# Patient Record
Sex: FEMALE | Race: WHITE | NOT HISPANIC OR LATINO | Employment: OTHER | URBAN - METROPOLITAN AREA
[De-identification: names, ages, dates, MRNs, and addresses within clinical notes are randomized per-mention and may not be internally consistent; named-entity substitution may affect disease eponyms.]

---

## 2020-10-20 ENCOUNTER — OFFICE VISIT (OUTPATIENT)
Dept: PRIMARY CARE CLINIC | Facility: CLINIC | Age: 70
End: 2020-10-20
Payer: MEDICARE

## 2020-10-20 ENCOUNTER — IMMUNIZATION (OUTPATIENT)
Dept: PHARMACY | Facility: CLINIC | Age: 70
End: 2020-10-20
Payer: MEDICARE

## 2020-10-20 ENCOUNTER — LAB VISIT (OUTPATIENT)
Dept: LAB | Facility: HOSPITAL | Age: 70
End: 2020-10-20
Attending: FAMILY MEDICINE
Payer: MEDICARE

## 2020-10-20 VITALS
BODY MASS INDEX: 26.64 KG/M2 | WEIGHT: 150.38 LBS | OXYGEN SATURATION: 100 % | HEIGHT: 63 IN | HEART RATE: 75 BPM | DIASTOLIC BLOOD PRESSURE: 60 MMHG | SYSTOLIC BLOOD PRESSURE: 100 MMHG | TEMPERATURE: 98 F

## 2020-10-20 DIAGNOSIS — Z00.00 ROUTINE MEDICAL EXAM: Primary | ICD-10-CM

## 2020-10-20 DIAGNOSIS — Z00.00 ROUTINE MEDICAL EXAM: ICD-10-CM

## 2020-10-20 DIAGNOSIS — I10 ESSENTIAL HYPERTENSION: ICD-10-CM

## 2020-10-20 DIAGNOSIS — R79.9 ABNORMAL FINDING OF BLOOD CHEMISTRY, UNSPECIFIED: ICD-10-CM

## 2020-10-20 DIAGNOSIS — I82.4Y9 DEEP VEIN THROMBOSIS (DVT) OF PROXIMAL LOWER EXTREMITY, UNSPECIFIED CHRONICITY, UNSPECIFIED LATERALITY: ICD-10-CM

## 2020-10-20 DIAGNOSIS — E78.2 MIXED HYPERLIPIDEMIA: ICD-10-CM

## 2020-10-20 DIAGNOSIS — Z79.01 ON ANTICOAGULANT THERAPY: ICD-10-CM

## 2020-10-20 LAB
ALBUMIN SERPL BCP-MCNC: 4.4 G/DL (ref 3.5–5.2)
ALP SERPL-CCNC: 71 U/L (ref 55–135)
ALT SERPL W/O P-5'-P-CCNC: 27 U/L (ref 10–44)
ANION GAP SERPL CALC-SCNC: 11 MMOL/L (ref 8–16)
AST SERPL-CCNC: 36 U/L (ref 10–40)
BASOPHILS # BLD AUTO: 0.02 K/UL (ref 0–0.2)
BASOPHILS NFR BLD: 0.4 % (ref 0–1.9)
BILIRUB SERPL-MCNC: 0.6 MG/DL (ref 0.1–1)
BUN SERPL-MCNC: 17 MG/DL (ref 8–23)
CALCIUM SERPL-MCNC: 10 MG/DL (ref 8.7–10.5)
CHLORIDE SERPL-SCNC: 99 MMOL/L (ref 95–110)
CHOLEST SERPL-MCNC: 156 MG/DL (ref 120–199)
CHOLEST/HDLC SERPL: 1.9 {RATIO} (ref 2–5)
CO2 SERPL-SCNC: 28 MMOL/L (ref 23–29)
CREAT SERPL-MCNC: 0.8 MG/DL (ref 0.5–1.4)
DIFFERENTIAL METHOD: ABNORMAL
EOSINOPHIL # BLD AUTO: 0.1 K/UL (ref 0–0.5)
EOSINOPHIL NFR BLD: 1.1 % (ref 0–8)
ERYTHROCYTE [DISTWIDTH] IN BLOOD BY AUTOMATED COUNT: 13.1 % (ref 11.5–14.5)
EST. GFR  (AFRICAN AMERICAN): >60 ML/MIN/1.73 M^2
EST. GFR  (NON AFRICAN AMERICAN): >60 ML/MIN/1.73 M^2
ESTIMATED AVG GLUCOSE: 111 MG/DL (ref 68–131)
GLUCOSE SERPL-MCNC: 102 MG/DL (ref 70–110)
HBA1C MFR BLD HPLC: 5.5 % (ref 4–5.6)
HCT VFR BLD AUTO: 35.9 % (ref 37–48.5)
HDLC SERPL-MCNC: 83 MG/DL (ref 40–75)
HDLC SERPL: 53.2 % (ref 20–50)
HGB BLD-MCNC: 11.9 G/DL (ref 12–16)
IMM GRANULOCYTES # BLD AUTO: 0.01 K/UL (ref 0–0.04)
IMM GRANULOCYTES NFR BLD AUTO: 0.2 % (ref 0–0.5)
LDLC SERPL CALC-MCNC: 61.4 MG/DL (ref 63–159)
LYMPHOCYTES # BLD AUTO: 1.1 K/UL (ref 1–4.8)
LYMPHOCYTES NFR BLD: 24.9 % (ref 18–48)
MCH RBC QN AUTO: 32.7 PG (ref 27–31)
MCHC RBC AUTO-ENTMCNC: 33.1 G/DL (ref 32–36)
MCV RBC AUTO: 99 FL (ref 82–98)
MONOCYTES # BLD AUTO: 0.4 K/UL (ref 0.3–1)
MONOCYTES NFR BLD: 9.2 % (ref 4–15)
NEUTROPHILS # BLD AUTO: 2.9 K/UL (ref 1.8–7.7)
NEUTROPHILS NFR BLD: 64.2 % (ref 38–73)
NONHDLC SERPL-MCNC: 73 MG/DL
NRBC BLD-RTO: 0 /100 WBC
PLATELET # BLD AUTO: 226 K/UL (ref 150–350)
PMV BLD AUTO: 10 FL (ref 9.2–12.9)
POTASSIUM SERPL-SCNC: 4.3 MMOL/L (ref 3.5–5.1)
PROT SERPL-MCNC: 7.7 G/DL (ref 6–8.4)
RBC # BLD AUTO: 3.64 M/UL (ref 4–5.4)
SODIUM SERPL-SCNC: 138 MMOL/L (ref 136–145)
TRIGL SERPL-MCNC: 58 MG/DL (ref 30–150)
WBC # BLD AUTO: 4.46 K/UL (ref 3.9–12.7)

## 2020-10-20 PROCEDURE — 83036 HEMOGLOBIN GLYCOSYLATED A1C: CPT

## 2020-10-20 PROCEDURE — 36415 COLL VENOUS BLD VENIPUNCTURE: CPT | Mod: PN

## 2020-10-20 PROCEDURE — 80061 LIPID PANEL: CPT

## 2020-10-20 PROCEDURE — 80053 COMPREHEN METABOLIC PANEL: CPT

## 2020-10-20 PROCEDURE — 99387 INIT PM E/M NEW PAT 65+ YRS: CPT | Mod: S$GLB,,, | Performed by: FAMILY MEDICINE

## 2020-10-20 PROCEDURE — 99999 PR PBB SHADOW E&M-NEW PATIENT-LVL V: ICD-10-PCS | Mod: PBBFAC,,, | Performed by: FAMILY MEDICINE

## 2020-10-20 PROCEDURE — 85025 COMPLETE CBC W/AUTO DIFF WBC: CPT

## 2020-10-20 PROCEDURE — 99387 PR PREVENTIVE VISIT,NEW,65 & OVER: ICD-10-PCS | Mod: S$GLB,,, | Performed by: FAMILY MEDICINE

## 2020-10-20 PROCEDURE — 99999 PR PBB SHADOW E&M-NEW PATIENT-LVL V: CPT | Mod: PBBFAC,,, | Performed by: FAMILY MEDICINE

## 2020-10-20 RX ORDER — APIXABAN 5 MG/1
TABLET, FILM COATED ORAL
Qty: 180 TABLET | Refills: 1 | Status: SHIPPED | OUTPATIENT
Start: 2020-10-20 | End: 2021-05-12

## 2020-10-20 RX ORDER — ESOMEPRAZOLE MAGNESIUM 40 MG/1
CAPSULE, DELAYED RELEASE ORAL
Qty: 90 CAPSULE | Refills: 3 | Status: SHIPPED | OUTPATIENT
Start: 2020-10-20 | End: 2021-05-12

## 2020-10-20 RX ORDER — APIXABAN 5 MG/1
TABLET, FILM COATED ORAL
COMMUNITY
Start: 2020-10-14 | End: 2020-10-20 | Stop reason: SDUPTHER

## 2020-10-20 RX ORDER — ROSUVASTATIN CALCIUM 20 MG/1
TABLET, COATED ORAL
Qty: 90 TABLET | Refills: 3 | Status: SHIPPED | OUTPATIENT
Start: 2020-10-20 | End: 2021-11-01

## 2020-10-20 RX ORDER — LISINOPRIL AND HYDROCHLOROTHIAZIDE 12.5; 2 MG/1; MG/1
TABLET ORAL
COMMUNITY
Start: 2020-10-07 | End: 2020-10-20 | Stop reason: SDUPTHER

## 2020-10-20 RX ORDER — DOXYCYCLINE HYCLATE 50 MG/1
100 CAPSULE ORAL EVERY 12 HOURS PRN
COMMUNITY
End: 2020-10-20

## 2020-10-20 RX ORDER — ESOMEPRAZOLE MAGNESIUM 40 MG/1
CAPSULE, DELAYED RELEASE ORAL
COMMUNITY
Start: 2020-05-20 | End: 2020-10-20 | Stop reason: SDUPTHER

## 2020-10-20 RX ORDER — LISINOPRIL AND HYDROCHLOROTHIAZIDE 12.5; 2 MG/1; MG/1
TABLET ORAL
Qty: 90 TABLET | Refills: 3 | Status: SHIPPED | OUTPATIENT
Start: 2020-10-20 | End: 2021-05-18 | Stop reason: SINTOL

## 2020-10-20 RX ORDER — ROSUVASTATIN CALCIUM 20 MG/1
TABLET, COATED ORAL
COMMUNITY
Start: 2020-09-18 | End: 2020-10-20 | Stop reason: SDUPTHER

## 2020-10-20 NOTE — PROGRESS NOTES
Subjective:   Patient ID: Shirin Evans is a 70 y.o. female.    Chief Complaint: Establish Care and Annual Exam      HPI  70-year-old female here to establish with new PCP.  Had lower extremity DVT diagnosed March 2020.  Presently on Eliquis 5 mg p.o. b.i.d.      Patient queried and denies any further complaints.    Health maintenance reviewed  Delete patient declines gyn/Pap AMA.  States she had 1 about 2 years ago in [place]  Patient declines DEXA scan  Patient declines hep C test  Patient kinds colorectal screening AMA.  I offered Cologuard a decline  Patient declines pneumococcal vaccine  Patient declines Shingrix  Declined dermatology referral skin cancer screen  Educated about all these    ALLERGIES AND MEDICATIONS: updated and reviewed.  Review of patient's allergies indicates:  No Known Allergies    Current Outpatient Medications:     ELIQUIS 5 mg Tab, TK 1 T PO BID, Disp: 180 tablet, Rfl: 1    esomeprazole (NEXIUM) 40 MG capsule, TAKE 1 CAPSULE(40 MG) BY MOUTH DAILY in am 20 min before food, Disp: 90 capsule, Rfl: 3    lisinopriL-hydrochlorothiazide (PRINZIDE,ZESTORETIC) 20-12.5 mg per tablet, TK 1 T PO Daily, Disp: 90 tablet, Rfl: 3    rosuvastatin (CRESTOR) 20 MG tablet, TAKE 1 TABLET(20 MG) BY MOUTH DAILY, Disp: 90 tablet, Rfl: 3    Review of Systems   Constitutional: Negative for activity change, appetite change, chills, diaphoresis, fatigue, fever and unexpected weight change.   HENT: Negative for congestion, ear discharge, ear pain, facial swelling, hearing loss, nosebleeds, postnasal drip, rhinorrhea, sinus pressure, sneezing, sore throat, tinnitus, trouble swallowing and voice change.    Eyes: Negative for photophobia, pain, discharge, redness, itching and visual disturbance.   Respiratory: Negative for cough, chest tightness, shortness of breath and wheezing.    Cardiovascular: Negative for chest pain, palpitations and leg swelling.   Gastrointestinal: Negative for abdominal distention,  "abdominal pain, anal bleeding, blood in stool, constipation, diarrhea, nausea, rectal pain and vomiting.   Endocrine: Negative for cold intolerance, heat intolerance, polydipsia, polyphagia and polyuria.   Genitourinary: Negative for difficulty urinating, dysuria and flank pain.   Musculoskeletal: Negative for arthralgias, back pain, joint swelling, myalgias and neck pain.   Skin: Negative for rash.   Neurological: Negative for dizziness, tremors, seizures, syncope, speech difficulty, weakness, light-headedness, numbness and headaches.   Psychiatric/Behavioral: Negative for behavioral problems, confusion, decreased concentration, dysphoric mood, sleep disturbance and suicidal ideas. The patient is not nervous/anxious and is not hyperactive.        Objective:     Vitals:    10/20/20 0902   BP: 100/60   Pulse: 75   Temp: 98 °F (36.7 °C)   TempSrc: Oral   SpO2: 100%   Weight: 68.2 kg (150 lb 5.7 oz)   Height: 5' 3" (1.6 m)   PainSc: 0-No pain     Body mass index is 26.63 kg/m².    Physical Exam  Vitals signs and nursing note reviewed.   Constitutional:       General: She is not in acute distress.     Appearance: She is well-developed. She is not diaphoretic.   HENT:      Head: Normocephalic and atraumatic.      Right Ear: Hearing, tympanic membrane, ear canal and external ear normal. No tenderness.      Left Ear: Hearing, tympanic membrane, ear canal and external ear normal. No tenderness.      Nose: Nose normal.   Eyes:      General: Lids are normal. No scleral icterus.        Right eye: No discharge.         Left eye: No discharge.      Extraocular Movements:      Right eye: Normal extraocular motion.      Left eye: Normal extraocular motion.      Conjunctiva/sclera: Conjunctivae normal.      Right eye: Right conjunctiva is not injected.      Left eye: Left conjunctiva is not injected.      Pupils: Pupils are equal, round, and reactive to light.   Neck:      Musculoskeletal: Normal range of motion and neck supple. No " edema.      Thyroid: No thyromegaly.      Vascular: No carotid bruit or JVD.      Trachea: No tracheal deviation.   Cardiovascular:      Rate and Rhythm: Normal rate and regular rhythm.      Pulses: Normal pulses.      Heart sounds: Normal heart sounds. No murmur. No friction rub.   Pulmonary:      Effort: Pulmonary effort is normal. No accessory muscle usage or respiratory distress.      Breath sounds: Normal breath sounds. No wheezing, rhonchi or rales.   Abdominal:      General: Bowel sounds are normal. There is no distension or abdominal bruit.      Palpations: Abdomen is soft. There is no mass or pulsatile mass.      Tenderness: There is no abdominal tenderness. There is no guarding or rebound. Negative signs include Ballesteros's sign and McBurney's sign.   Lymphadenopathy:      Head:      Right side of head: No submandibular, preauricular or posterior auricular adenopathy.      Left side of head: No submandibular, preauricular or posterior auricular adenopathy.      Cervical: No cervical adenopathy.   Skin:     General: Skin is warm and dry.      Findings: No ecchymosis, erythema or rash. Rash is not urticarial.      Nails: There is no clubbing.     Neurological:      Mental Status: She is alert and oriented to person, place, and time.      GCS: GCS eye subscore is 4. GCS verbal subscore is 5. GCS motor subscore is 6.   Psychiatric:         Mood and Affect: Mood is not anxious or depressed. Affect is not angry or inappropriate.         Speech: Speech normal.         Behavior: Behavior normal. Behavior is cooperative.         Thought Content: Thought content normal.         Assessment and Plan:   Shirin was seen today for establish care and annual exam.    Diagnoses and all orders for this visit:    Routine medical exam  -     CBC auto differential; Future  -     Comprehensive Metabolic Panel; Future  -     Hemoglobin A1C; Future  -     Lipid Panel; Future  -     TSH; Future  -     T4, Free; Future  -     Cancel:  Ambulatory referral/consult to Gynecology; Future    Deep vein thrombosis (DVT) of proximal lower extremity, unspecified chronicity, unspecified laterality  -     Ambulatory referral/consult to Hematology / Oncology; Future    Abnormal finding of blood chemistry, unspecified   -     CBC auto differential; Future  -     Hemoglobin A1C; Future  -     Lipid Panel; Future    Essential hypertension    Mixed hyperlipidemia    BMI 26.0-26.9,adult    On anticoagulant therapy    Other orders  -     ELIQUIS 5 mg Tab; TK 1 T PO BID  -     lisinopriL-hydrochlorothiazide (PRINZIDE,ZESTORETIC) 20-12.5 mg per tablet; TK 1 T PO Daily  -     rosuvastatin (CRESTOR) 20 MG tablet; TAKE 1 TABLET(20 MG) BY MOUTH DAILY  -     esomeprazole (NEXIUM) 40 MG capsule; TAKE 1 CAPSULE(40 MG) BY MOUTH DAILY in am 20 min before food  -     Cancel: Ambulatory referral/consult to Dermatology; Future  -     Cancel: DXA Bone Density Spine And Hip; Future        No follow-ups on file.    THIS NOTE WILL BE SHARED WITH THE PATIENT.

## 2020-10-23 ENCOUNTER — TELEPHONE (OUTPATIENT)
Dept: HEMATOLOGY/ONCOLOGY | Facility: CLINIC | Age: 70
End: 2020-10-23

## 2020-10-23 NOTE — TELEPHONE ENCOUNTER
----- Message from Sarita Ceballos RN sent at 10/20/2020 10:42 AM CDT -----  Ok to schedule  Anamaria  ----- Message -----  From: Kayla Cantor  Sent: 10/20/2020   9:47 AM CDT  To: [facility]    Dr.Michael Rivas has placed a referral for the patient to be seen with Hematology/Oncology. Please assist with scheduling.     Deep vein thrombosis (DVT) of proximal lower extremity

## 2020-11-09 ENCOUNTER — OFFICE VISIT (OUTPATIENT)
Dept: HEMATOLOGY/ONCOLOGY | Facility: CLINIC | Age: 70
End: 2020-11-09
Payer: MEDICARE

## 2020-11-09 VITALS
HEART RATE: 71 BPM | OXYGEN SATURATION: 96 % | HEIGHT: 63 IN | DIASTOLIC BLOOD PRESSURE: 72 MMHG | TEMPERATURE: 99 F | BODY MASS INDEX: 26.72 KG/M2 | RESPIRATION RATE: 18 BRPM | WEIGHT: 150.81 LBS | SYSTOLIC BLOOD PRESSURE: 155 MMHG

## 2020-11-09 DIAGNOSIS — I10 ESSENTIAL HYPERTENSION: ICD-10-CM

## 2020-11-09 DIAGNOSIS — D53.9 MACROCYTIC ANEMIA: ICD-10-CM

## 2020-11-09 DIAGNOSIS — I82.4Y9 DEEP VEIN THROMBOSIS (DVT) OF PROXIMAL LOWER EXTREMITY, UNSPECIFIED CHRONICITY, UNSPECIFIED LATERALITY: ICD-10-CM

## 2020-11-09 DIAGNOSIS — Z86.711 HISTORY OF PULMONARY EMBOLISM: ICD-10-CM

## 2020-11-09 DIAGNOSIS — E78.2 MIXED HYPERLIPIDEMIA: Primary | ICD-10-CM

## 2020-11-09 PROCEDURE — 3008F PR BODY MASS INDEX (BMI) DOCUMENTED: ICD-10-PCS | Mod: CPTII,S$GLB,, | Performed by: INTERNAL MEDICINE

## 2020-11-09 PROCEDURE — 99205 PR OFFICE/OUTPT VISIT, NEW, LEVL V, 60-74 MIN: ICD-10-PCS | Mod: S$GLB,,, | Performed by: INTERNAL MEDICINE

## 2020-11-09 PROCEDURE — 1126F PR PAIN SEVERITY QUANTIFIED, NO PAIN PRESENT: ICD-10-PCS | Mod: S$GLB,,, | Performed by: INTERNAL MEDICINE

## 2020-11-09 PROCEDURE — 1101F PT FALLS ASSESS-DOCD LE1/YR: CPT | Mod: CPTII,S$GLB,, | Performed by: INTERNAL MEDICINE

## 2020-11-09 PROCEDURE — 3008F BODY MASS INDEX DOCD: CPT | Mod: CPTII,S$GLB,, | Performed by: INTERNAL MEDICINE

## 2020-11-09 PROCEDURE — 1126F AMNT PAIN NOTED NONE PRSNT: CPT | Mod: S$GLB,,, | Performed by: INTERNAL MEDICINE

## 2020-11-09 PROCEDURE — 99999 PR PBB SHADOW E&M-EST. PATIENT-LVL III: ICD-10-PCS | Mod: PBBFAC,,, | Performed by: INTERNAL MEDICINE

## 2020-11-09 PROCEDURE — 1159F MED LIST DOCD IN RCRD: CPT | Mod: S$GLB,,, | Performed by: INTERNAL MEDICINE

## 2020-11-09 PROCEDURE — 99205 OFFICE O/P NEW HI 60 MIN: CPT | Mod: S$GLB,,, | Performed by: INTERNAL MEDICINE

## 2020-11-09 PROCEDURE — 1101F PR PT FALLS ASSESS DOC 0-1 FALLS W/OUT INJ PAST YR: ICD-10-PCS | Mod: CPTII,S$GLB,, | Performed by: INTERNAL MEDICINE

## 2020-11-09 PROCEDURE — 99999 PR PBB SHADOW E&M-EST. PATIENT-LVL III: CPT | Mod: PBBFAC,,, | Performed by: INTERNAL MEDICINE

## 2020-11-09 PROCEDURE — 1159F PR MEDICATION LIST DOCUMENTED IN MEDICAL RECORD: ICD-10-PCS | Mod: S$GLB,,, | Performed by: INTERNAL MEDICINE

## 2020-11-09 NOTE — PROGRESS NOTES
CC: h/o PE , hematology consultation      HPI: Ms. Evans,70, is here for hematology consultation for h/o pulmonary embolism. She has hypertension, GERD. She was diagnosed with PE in March 2020 in [place].  It was unprovoked except for long drive to [place] from [place] a couple of weeks before the diagnosis . She was started on Apixaban. She denies any bleeding since she was started on Apixaban.  No recent change in weight or appetite. She has nop prior h/o DVT, PE or CVA. Her sister also had a blood clot also after a long drive. She has never smoked.       Review of Systems   Constitutional: Positive for malaise/fatigue. Negative for chills, fever and weight loss.   HENT: Negative for ear discharge, hearing loss and nosebleeds.    Eyes: Negative for blurred vision and double vision.   Respiratory: Negative for cough, sputum production and shortness of breath.    Cardiovascular: Negative for chest pain and palpitations.   Gastrointestinal: Negative for abdominal pain, blood in stool, constipation, melena, nausea and vomiting.   Genitourinary: Negative for frequency, hematuria and urgency.   Musculoskeletal: Negative for myalgias and neck pain.   Neurological: Negative for tingling and headaches.   Endo/Heme/Allergies: Negative for environmental allergies. Does not bruise/bleed easily.   Psychiatric/Behavioral: Negative for depression, hallucinations and substance abuse.        Medical History:    1. PE  2. HTn  3. Dyslipidemia  4. GERD    History reviewed. No pertinent surgical history.        Family History:    Father had CA prostate in his 80s   Maternal grandmother had CA stomach in her 70s        Social History     Socioeconomic History    Marital status: Single     Spouse name: Not on file    Number of children: Not on file    Years of education: Not on file    Highest education level: Not on file   Occupational History    Not on file   Tobacco Use    Smoking status: Never Smoker   Substance  and Sexual Activity    Alcohol use: Not on file    Drug use: Not on file    Sexual activity: Not on file   Lifestyle    Physical activity     Days per week: Not on file     Minutes per session: Not on file    Stress: Not on file       Review of patient's allergies indicates:  No Known Allergies          Current Outpatient Medications   Medication Sig    ELIQUIS 5 mg Tab TK 1 T PO BID    esomeprazole (NEXIUM) 40 MG capsule TAKE 1 CAPSULE(40 MG) BY MOUTH DAILY in am 20 min before food    lisinopriL-hydrochlorothiazide (PRINZIDE,ZESTORETIC) 20-12.5 mg per tablet TK 1 T PO Daily    rosuvastatin (CRESTOR) 20 MG tablet TAKE 1 TABLET(20 MG) BY MOUTH DAILY     No current facility-administered medications for this visit.              3/17/20 b/l LE venous doppler    Impressions    No evidence of deep vein thrombosis involving the bilateral lower extremities.            3/24/20 CTA chest    Impression:   1.  Right upper lobe pulmonary embolism.   2.  Coronary artery disease   3.  Cholelithiasis.    Component      Latest Ref Rng & Units 10/20/2020   WBC      3.90 - 12.70 K/uL 4.46   RBC      4.00 - 5.40 M/uL 3.64 (L)   Hemoglobin      12.0 - 16.0 g/dL 11.9 (L)   Hematocrit      37.0 - 48.5 % 35.9 (L)   MCV      82 - 98 fL 99 (H)   MCH      27.0 - 31.0 pg 32.7 (H)   MCHC      32.0 - 36.0 g/dL 33.1   RDW      11.5 - 14.5 % 13.1   Platelets      150 - 350 K/uL 226   MPV      9.2 - 12.9 fL 10.0   Immature Granulocytes      0.0 - 0.5 % 0.2   Gran # (ANC)      1.8 - 7.7 K/uL 2.9   Immature Grans (Abs)      0.00 - 0.04 K/uL 0.01   Lymph #      1.0 - 4.8 K/uL 1.1   Mono #      0.3 - 1.0 K/uL 0.4   Eos #      0.0 - 0.5 K/uL 0.1   Baso #      0.00 - 0.20 K/uL 0.02   nRBC      0 /100 WBC 0   Gran %      38.0 - 73.0 % 64.2   Lymph %      18.0 - 48.0 % 24.9   Mono %      4.0 - 15.0 % 9.2   Eosinophil %      0.0 - 8.0 % 1.1   Basophil %      0.0 - 1.9 % 0.4   Differential Method       Automated   Sodium      136 - 145 mmol/L 138    Potassium      3.5 - 5.1 mmol/L 4.3   Chloride      95 - 110 mmol/L 99   CO2      23 - 29 mmol/L 28   Glucose      70 - 110 mg/dL 102   BUN      8 - 23 mg/dL 17   Creatinine      0.5 - 1.4 mg/dL 0.8   Calcium      8.7 - 10.5 mg/dL 10.0   PROTEIN TOTAL      6.0 - 8.4 g/dL 7.7   Albumin      3.5 - 5.2 g/dL 4.4   BILIRUBIN TOTAL      0.1 - 1.0 mg/dL 0.6   Alkaline Phosphatase      55 - 135 U/L 71   AST      10 - 40 U/L 36   ALT      10 - 44 U/L 27   Anion Gap      8 - 16 mmol/L 11   eGFR if African American      >60 mL/min/1.73 m:2 >60.0   eGFR if non African American      >60 mL/min/1.73 m:2 >60.0   Cholesterol      120 - 199 mg/dL 156   Triglycerides      30 - 150 mg/dL 58   HDL      40 - 75 mg/dL 83 (H)   LDL Cholesterol External      63.0 - 159.0 mg/dL 61.4 (L)   HDL/Cholesterol Ratio      20.0 - 50.0 % 53.2 (H)   Total Cholesterol/HDL Ratio      2.0 - 5.0 1.9 (L)   Non-HDL Cholesterol      mg/dL 73       Vitals:    11/09/20 1030   BP: (!) 155/72   Pulse: 71   Resp: 18   Temp: 98.7 °F (37.1 °C)     SpO2 96% on RA    Physical Exam   Constitutional: She is oriented to person, place, and time. She appears well-developed.   HENT:   Head: Normocephalic and atraumatic.   Mouth/Throat: No oropharyngeal exudate.   Eyes: No scleral icterus.   Cardiovascular: Normal rate.   Pulmonary/Chest: Effort normal. No respiratory distress. She has no wheezes. She has no rales.   Abdominal: Soft. She exhibits no distension. There is no abdominal tenderness. There is no rebound.   Musculoskeletal:         General: No edema.   Lymphadenopathy:     She has no cervical adenopathy.   Neurological: She is alert and oriented to person, place, and time.   Skin: Skin is warm.   Psychiatric: She has a normal mood and affect.         Assessment:    1. History of pulmonary embolism  2. HT, essential  3. Dyslipidemia  4. GERD  5. Macrocytic anemia        Plan:      1. It was diagnosed in March 2020. No provoking factors except for a long drive.  She was not a smoker at the time. She was not using hormonal supplements/ birth control. Her sister had a blood clot after a long drive as well. No other thrombotic event in her family. She has no recent changes in weight or appetite.  She is not uptodate with colonoscopy or mammogram. I recommended that she have it both.  She had PAP smear in 2018 and was normal.  Recent CBC (10/20/20) shows mild macrocytic anemia. WBC, differential count, platelets were all normal.  Hypercoagulable work up not indicated at this time.  I recommended she stop Apixaban as she had 1 episode of PE and has been anti-coagulated for > 6 months.  She will start ASA 81mg BID after she stops Apixaban.          2,3,4: She follows with her PCP.     5. Mild, asymptomatic

## 2020-11-09 NOTE — LETTER
November 9, 2020      Jeb Rivas MD  [address]  [facility] [address]           [facility]  [address]  [facility] [address]  Phone: [phone]          Patient: Shirin Evans   MR Number: [id number]   YOB: 1950   Date of Visit: 11/9/2020       Dear Dr. Jeb Rivas:    Thank you for referring Shirin Evans to me for evaluation. Attached you will find relevant portions of my assessment and plan of care.    If you have questions, please do not hesitate to call me. I look forward to following Shirin Evans along with you.    Sincerely,    Laura Walsh MD    Enclosure  CC:  No Recipients    If you would like to receive this communication electronically, please contact [email or web address] or [phone] to request more information on [facility] access.    For providers and/or their staff who would like to refer a patient to [facility], please contact us through our one-stop-shop provider referral line, [facility], at [phone].    If you feel you have received this communication in error or would no longer like to receive these types of communications, please e-mail [email or web address]

## 2021-05-06 ENCOUNTER — PATIENT MESSAGE (OUTPATIENT)
Dept: INTERNAL MEDICINE | Facility: CLINIC | Age: 71
End: 2021-05-06

## 2021-05-06 ENCOUNTER — OFFICE VISIT (OUTPATIENT)
Dept: INTERNAL MEDICINE | Facility: CLINIC | Age: 71
End: 2021-05-06
Payer: MEDICARE

## 2021-05-06 ENCOUNTER — HOSPITAL ENCOUNTER (OUTPATIENT)
Dept: RADIOLOGY | Facility: HOSPITAL | Age: 71
Discharge: HOME | End: 2021-05-06
Attending: FAMILY MEDICINE
Payer: MEDICARE

## 2021-05-06 VITALS
TEMPERATURE: 98 F | DIASTOLIC BLOOD PRESSURE: 76 MMHG | HEART RATE: 81 BPM | SYSTOLIC BLOOD PRESSURE: 102 MMHG | WEIGHT: 152.31 LBS | HEIGHT: 63 IN | BODY MASS INDEX: 26.99 KG/M2 | OXYGEN SATURATION: 98 %
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
## 2021-05-06 DIAGNOSIS — S09.93XA FACIAL INJURY, INITIAL ENCOUNTER: ICD-10-CM

## 2021-05-06 DIAGNOSIS — S09.90XA HEAD TRAUMA, INITIAL ENCOUNTER: ICD-10-CM

## 2021-05-06 DIAGNOSIS — R42 LIGHT HEADEDNESS: ICD-10-CM

## 2021-05-06 DIAGNOSIS — F41.9 ANXIETY: ICD-10-CM
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
## 2021-05-06 PROCEDURE — 99214 OFFICE O/P EST MOD 30 MIN: CPT | Mod: S$GLB,,, | Performed by: FAMILY MEDICINE

## 2021-05-06 PROCEDURE — 99999 PR PBB SHADOW E&M-EST. PATIENT-LVL V: CPT | Mod: PBBFAC,,, | Performed by: FAMILY MEDICINE

## 2021-05-06 PROCEDURE — 99215 OFFICE O/P EST HI 40 MIN: CPT | Mod: PBBFAC,25 | Performed by: FAMILY MEDICINE

## 2021-05-06 PROCEDURE — 99214 PR OFFICE/OUTPT VISIT, EST, LEVL IV, 30-39 MIN: ICD-10-PCS | Mod: S$GLB,,, | Performed by: FAMILY MEDICINE

## 2021-05-06 PROCEDURE — 70450 CT HEAD WITHOUT CONTRAST: ICD-10-PCS | Mod: 26,,, | Performed by: RADIOLOGY

## 2021-05-06 PROCEDURE — 70450 CT HEAD/BRAIN W/O DYE: CPT | Mod: 26,,, | Performed by: RADIOLOGY

## 2021-05-06 PROCEDURE — 70450 CT HEAD/BRAIN W/O DYE: CPT | Mod: TC

## 2021-05-06 PROCEDURE — 99999 PR PBB SHADOW E&M-EST. PATIENT-LVL V: ICD-10-PCS | Mod: PBBFAC,,, | Performed by: FAMILY MEDICINE

## 2021-05-09 ENCOUNTER — PATIENT MESSAGE (OUTPATIENT)
Dept: INTERNAL MEDICINE | Facility: CLINIC | Age: 71
End: 2021-05-09

## 2021-05-18 ENCOUNTER — OFFICE VISIT (OUTPATIENT)
Dept: INTERNAL MEDICINE | Facility: CLINIC | Age: 71
End: 2021-05-18
Payer: MEDICARE

## 2021-05-18 VITALS
TEMPERATURE: 99 F | HEIGHT: 63 IN | HEART RATE: 66 BPM | OXYGEN SATURATION: 98 % | DIASTOLIC BLOOD PRESSURE: 70 MMHG | WEIGHT: 157.19 LBS | SYSTOLIC BLOOD PRESSURE: 126 MMHG | BODY MASS INDEX: 27.85 KG/M2

## 2021-05-18 DIAGNOSIS — Z12.31 SCREENING MAMMOGRAM, ENCOUNTER FOR: ICD-10-CM

## 2021-05-18 DIAGNOSIS — Z13.29 SCREENING FOR THYROID DISORDER: ICD-10-CM

## 2021-05-18 DIAGNOSIS — D64.9 ANEMIA, UNSPECIFIED TYPE: Primary | ICD-10-CM

## 2021-05-18 DIAGNOSIS — I10 ESSENTIAL HYPERTENSION: ICD-10-CM

## 2021-05-18 DIAGNOSIS — Z78.0 POST-MENOPAUSE: ICD-10-CM

## 2021-05-18 DIAGNOSIS — Z11.59 NEED FOR HEPATITIS C SCREENING TEST: ICD-10-CM

## 2021-05-18 PROCEDURE — 99999 PR PBB SHADOW E&M-EST. PATIENT-LVL IV: ICD-10-PCS | Mod: PBBFAC,,, | Performed by: FAMILY MEDICINE

## 2021-05-18 PROCEDURE — 99397 PR PREVENTIVE VISIT,EST,65 & OVER: ICD-10-PCS | Mod: S$GLB,,, | Performed by: FAMILY MEDICINE

## 2021-05-18 PROCEDURE — 99999 PR PBB SHADOW E&M-EST. PATIENT-LVL IV: CPT | Mod: PBBFAC,,, | Performed by: FAMILY MEDICINE

## 2021-05-18 PROCEDURE — 99397 PER PM REEVAL EST PAT 65+ YR: CPT | Mod: S$GLB,,, | Performed by: FAMILY MEDICINE

## 2021-05-18 RX ORDER — LISINOPRIL 20 MG/1
20 TABLET ORAL DAILY
Qty: 30 TABLET | Refills: 11 | Status: SHIPPED | OUTPATIENT
Start: 2021-05-18 | End: 2021-06-01 | Stop reason: DRUGHIGH

## 2021-05-18 RX ORDER — LISINOPRIL 20 MG/1
20 TABLET ORAL DAILY
Qty: 90 TABLET | Refills: 3 | Status: SHIPPED | OUTPATIENT
Start: 2021-05-18 | End: 2021-05-18 | Stop reason: CLARIF

## 2021-06-01 ENCOUNTER — OFFICE VISIT (OUTPATIENT)
Dept: INTERNAL MEDICINE | Facility: CLINIC | Age: 71
DRG: 026 | End: 2021-06-01
Payer: MEDICARE

## 2021-06-01 ENCOUNTER — CLINICAL SUPPORT (OUTPATIENT)
Dept: INTERNAL MEDICINE | Facility: CLINIC | Age: 71
DRG: 026 | End: 2021-06-01
Payer: MEDICARE

## 2021-06-01 VITALS
HEIGHT: 63 IN | WEIGHT: 153.69 LBS | OXYGEN SATURATION: 99 % | HEART RATE: 73 BPM | DIASTOLIC BLOOD PRESSURE: 78 MMHG | TEMPERATURE: 98 F | BODY MASS INDEX: 27.23 KG/M2 | SYSTOLIC BLOOD PRESSURE: 150 MMHG

## 2021-06-01 DIAGNOSIS — R68.89 DIFFICULTY IN VOLUNTARY MOVEMENT: ICD-10-CM

## 2021-06-01 DIAGNOSIS — I10 ESSENTIAL HYPERTENSION: ICD-10-CM

## 2021-06-01 DIAGNOSIS — R51.9 NONINTRACTABLE HEADACHE, UNSPECIFIED CHRONICITY PATTERN, UNSPECIFIED HEADACHE TYPE: ICD-10-CM

## 2021-06-01 DIAGNOSIS — R29.2 DECREASED RIGHT PATELLAR REFLEX: ICD-10-CM

## 2021-06-01 PROCEDURE — 3008F BODY MASS INDEX DOCD: CPT | Mod: CPTII,S$GLB,, | Performed by: FAMILY MEDICINE

## 2021-06-01 PROCEDURE — 3008F PR BODY MASS INDEX (BMI) DOCUMENTED: ICD-10-PCS | Mod: CPTII,S$GLB,, | Performed by: FAMILY MEDICINE

## 2021-06-01 PROCEDURE — 3288F FALL RISK ASSESSMENT DOCD: CPT | Mod: CPTII,S$GLB,, | Performed by: FAMILY MEDICINE

## 2021-06-01 PROCEDURE — 1126F PR PAIN SEVERITY QUANTIFIED, NO PAIN PRESENT: ICD-10-PCS | Mod: S$GLB,,, | Performed by: FAMILY MEDICINE

## 2021-06-01 PROCEDURE — 1126F AMNT PAIN NOTED NONE PRSNT: CPT | Mod: S$GLB,,, | Performed by: FAMILY MEDICINE

## 2021-06-01 PROCEDURE — 1159F MED LIST DOCD IN RCRD: CPT | Mod: S$GLB,,, | Performed by: FAMILY MEDICINE

## 2021-06-01 PROCEDURE — 3288F PR FALLS RISK ASSESSMENT DOCUMENTED: ICD-10-PCS | Mod: CPTII,S$GLB,, | Performed by: FAMILY MEDICINE

## 2021-06-01 PROCEDURE — 99213 PR OFFICE/OUTPT VISIT, EST, LEVL III, 20-29 MIN: ICD-10-PCS | Mod: S$GLB,,, | Performed by: FAMILY MEDICINE

## 2021-06-01 PROCEDURE — 1101F PR PT FALLS ASSESS DOC 0-1 FALLS W/OUT INJ PAST YR: ICD-10-PCS | Mod: CPTII,S$GLB,, | Performed by: FAMILY MEDICINE

## 2021-06-01 PROCEDURE — 99999 PR PBB SHADOW E&M-EST. PATIENT-LVL IV: ICD-10-PCS | Mod: PBBFAC,,, | Performed by: FAMILY MEDICINE

## 2021-06-01 PROCEDURE — 1159F PR MEDICATION LIST DOCUMENTED IN MEDICAL RECORD: ICD-10-PCS | Mod: S$GLB,,, | Performed by: FAMILY MEDICINE

## 2021-06-01 PROCEDURE — 99213 OFFICE O/P EST LOW 20 MIN: CPT | Mod: S$GLB,,, | Performed by: FAMILY MEDICINE

## 2021-06-01 PROCEDURE — 1101F PT FALLS ASSESS-DOCD LE1/YR: CPT | Mod: CPTII,S$GLB,, | Performed by: FAMILY MEDICINE

## 2021-06-01 PROCEDURE — 99999 PR PBB SHADOW E&M-EST. PATIENT-LVL IV: CPT | Mod: PBBFAC,,, | Performed by: FAMILY MEDICINE

## 2021-06-01 RX ORDER — LISINOPRIL 30 MG/1
30 TABLET ORAL DAILY
Qty: 30 TABLET | Refills: 11 | Status: ON HOLD | OUTPATIENT
Start: 2021-06-01 | End: 2021-06-11 | Stop reason: HOSPADM

## 2021-06-02 ENCOUNTER — TELEPHONE (OUTPATIENT)
Dept: INTERNAL MEDICINE | Facility: CLINIC | Age: 71
End: 2021-06-02

## 2021-06-02 ENCOUNTER — PATIENT MESSAGE (OUTPATIENT)
Dept: INTERNAL MEDICINE | Facility: CLINIC | Age: 71
End: 2021-06-02

## 2021-06-02 ENCOUNTER — PATIENT MESSAGE (OUTPATIENT)
Dept: ADMINISTRATIVE | Facility: HOSPITAL | Age: 71
End: 2021-06-02

## 2021-06-02 DIAGNOSIS — D53.9 MACROCYTIC ANEMIA: Primary | ICD-10-CM

## 2021-06-02 DIAGNOSIS — Z12.11 COLON CANCER SCREENING: ICD-10-CM

## 2021-06-02 DIAGNOSIS — D50.9 IRON DEFICIENCY ANEMIA, UNSPECIFIED IRON DEFICIENCY ANEMIA TYPE: ICD-10-CM

## 2021-06-02 RX ORDER — FERROUS SULFATE 325(65) MG
325 TABLET ORAL EVERY OTHER DAY
Qty: 45 TABLET | Refills: 3 | Status: SHIPPED | OUTPATIENT
Start: 2021-06-02 | End: 2022-07-26 | Stop reason: SDUPTHER

## 2021-06-03 ENCOUNTER — HOSPITAL ENCOUNTER (INPATIENT)
Facility: HOSPITAL | Age: 71
LOS: 9 days | Discharge: NURSING, REHAB OR LONG-TERM CARE | DRG: 026 | End: 2021-06-12
Attending: EMERGENCY MEDICINE | Admitting: PSYCHIATRY & NEUROLOGY
Payer: MEDICARE

## 2021-06-03 ENCOUNTER — HOSPITAL ENCOUNTER (OUTPATIENT)
Dept: RADIOLOGY | Facility: HOSPITAL | Age: 71
Discharge: HOME | End: 2021-06-03
Attending: PSYCHIATRY & NEUROLOGY
Payer: MEDICARE

## 2021-06-03 ENCOUNTER — HOSPITAL ENCOUNTER (EMERGENCY)
Facility: HOSPITAL | Age: 71
Discharge: ANOTHER HOSPITAL | End: 2021-06-03
Attending: EMERGENCY MEDICINE
Payer: MEDICARE

## 2021-06-03 ENCOUNTER — ANESTHESIA EVENT (OUTPATIENT)
Dept: SURGERY | Facility: HOSPITAL | Age: 71
DRG: 026 | End: 2021-06-03
Payer: MEDICARE

## 2021-06-03 ENCOUNTER — PATIENT MESSAGE (OUTPATIENT)
Dept: NEUROLOGY | Facility: CLINIC | Age: 71
End: 2021-06-03

## 2021-06-03 ENCOUNTER — OFFICE VISIT (OUTPATIENT)
Dept: NEUROLOGY | Facility: CLINIC | Age: 71
End: 2021-06-03
Payer: MEDICARE

## 2021-06-03 ENCOUNTER — HOSPITAL ENCOUNTER (OUTPATIENT)
Dept: CARDIOLOGY | Facility: HOSPITAL | Age: 71
Discharge: HOME | End: 2021-06-03
Attending: PSYCHIATRY & NEUROLOGY
Payer: MEDICARE

## 2021-06-03 VITALS
RESPIRATION RATE: 18 BRPM | BODY MASS INDEX: 26.93 KG/M2 | HEART RATE: 65 BPM | DIASTOLIC BLOOD PRESSURE: 78 MMHG | WEIGHT: 152 LBS | SYSTOLIC BLOOD PRESSURE: 145 MMHG | OXYGEN SATURATION: 97 % | TEMPERATURE: 99 F

## 2021-06-03 VITALS
BODY MASS INDEX: 27.19 KG/M2 | DIASTOLIC BLOOD PRESSURE: 82 MMHG | WEIGHT: 153.44 LBS | HEART RATE: 76 BPM | SYSTOLIC BLOOD PRESSURE: 171 MMHG | HEIGHT: 63 IN

## 2021-06-03 VITALS — WEIGHT: 153 LBS | BODY MASS INDEX: 27.11 KG/M2 | HEIGHT: 63 IN

## 2021-06-03 DIAGNOSIS — R29.898 WEAKNESS OF FOOT, RIGHT: ICD-10-CM

## 2021-06-03 DIAGNOSIS — G44.329 CHRONIC POST-TRAUMATIC HEADACHE, NOT INTRACTABLE: ICD-10-CM

## 2021-06-03 DIAGNOSIS — S06.5XAA BILATERAL SUBDURAL HEMATOMAS: Primary | ICD-10-CM

## 2021-06-03 DIAGNOSIS — Z74.09 IMPAIRED MOBILITY AND ADLS: ICD-10-CM

## 2021-06-03 DIAGNOSIS — R29.818 OTHER SYMPTOMS AND SIGNS INVOLVING THE NERVOUS SYSTEM: ICD-10-CM

## 2021-06-03 DIAGNOSIS — Z86.711 HISTORY OF PULMONARY EMBOLISM: ICD-10-CM

## 2021-06-03 DIAGNOSIS — R29.6 MULTIPLE FALLS: ICD-10-CM

## 2021-06-03 DIAGNOSIS — G93.5 BRAIN COMPRESSION: ICD-10-CM

## 2021-06-03 DIAGNOSIS — Z78.9 IMPAIRED MOBILITY AND ADLS: ICD-10-CM

## 2021-06-03 DIAGNOSIS — R27.0 ATAXIA: ICD-10-CM

## 2021-06-03 DIAGNOSIS — S06.5XAA SUBDURAL HEMATOMA: Primary | ICD-10-CM

## 2021-06-03 DIAGNOSIS — I63.9 CEREBROVASCULAR ACCIDENT (CVA), UNSPECIFIED MECHANISM: Primary | ICD-10-CM

## 2021-06-03 DIAGNOSIS — I63.9 CEREBROVASCULAR ACCIDENT (CVA), UNSPECIFIED MECHANISM: ICD-10-CM

## 2021-06-03 LAB
ABO + RH BLD: NORMAL
ALBUMIN SERPL BCP-MCNC: 4.1 G/DL (ref 3.5–5.2)
ALP SERPL-CCNC: 70 U/L (ref 55–135)
ALT SERPL W/O P-5'-P-CCNC: 12 U/L (ref 10–44)
ANION GAP SERPL CALC-SCNC: 12 MMOL/L (ref 8–16)
AORTIC ROOT ANNULUS: 2.51 CM
AORTIC VALVE CUSP SEPERATION: 1.46 CM
APTT BLDCRRT: 23 SEC (ref 21–32)
AST SERPL-CCNC: 19 U/L (ref 10–40)
AV INDEX (PROSTH): 0.92
AV MEAN GRADIENT: 5 MMHG
AV PEAK GRADIENT: 10 MMHG
AV VALVE AREA: 2.69 CM2
AV VELOCITY RATIO: 0.87
BILIRUB SERPL-MCNC: 0.6 MG/DL (ref 0.1–1)
BLD GP AB SCN CELLS X3 SERPL QL: NORMAL
BSA FOR ECHO PROCEDURE: 1.76 M2
BUN SERPL-MCNC: 10 MG/DL (ref 8–23)
CALCIUM SERPL-MCNC: 9.6 MG/DL (ref 8.7–10.5)
CHLORIDE SERPL-SCNC: 108 MMOL/L (ref 95–110)
CHOLEST SERPL-MCNC: 126 MG/DL (ref 120–199)
CHOLEST/HDLC SERPL: 2.3 {RATIO} (ref 2–5)
CO2 SERPL-SCNC: 22 MMOL/L (ref 23–29)
CREAT SERPL-MCNC: 0.6 MG/DL (ref 0.5–1.4)
CTP QC/QA: YES
CV ECHO LV RWT: 0.6 CM
DOP CALC AO PEAK VEL: 1.59 M/S
DOP CALC AO VTI: 29.11 CM
DOP CALC LVOT AREA: 2.9 CM2
DOP CALC LVOT DIAMETER: 1.93 CM
DOP CALC LVOT PEAK VEL: 1.38 M/S
DOP CALC LVOT STROKE VOLUME: 78.42 CM3
DOP CALCLVOT PEAK VEL VTI: 26.82 CM
E WAVE DECELERATION TIME: 171.3 MSEC
E/A RATIO: 0.68
E/E' RATIO: 6.84 M/S
ECHO LV POSTERIOR WALL: 1.17 CM (ref 0.6–1.1)
EJECTION FRACTION: 55 %
ERYTHROCYTE [DISTWIDTH] IN BLOOD BY AUTOMATED COUNT: 12.9 % (ref 11.5–14.5)
EST. GFR  (AFRICAN AMERICAN): >60 ML/MIN/1.73 M^2
EST. GFR  (NON AFRICAN AMERICAN): >60 ML/MIN/1.73 M^2
FRACTIONAL SHORTENING: 28 % (ref 28–44)
GLUCOSE SERPL-MCNC: 101 MG/DL (ref 70–110)
HCT VFR BLD AUTO: 36 % (ref 37–48.5)
HDLC SERPL-MCNC: 56 MG/DL (ref 40–75)
HDLC SERPL: 44.4 % (ref 20–50)
HGB BLD-MCNC: 12.6 G/DL (ref 12–16)
INR PPP: 1 (ref 0.8–1.2)
INR PPP: 1 (ref 0.8–1.2)
INTERVENTRICULAR SEPTUM: 1.19 CM (ref 0.6–1.1)
IVRT: 78.02 MSEC
LA MAJOR: 4.94 CM
LA MINOR: 5.21 CM
LA WIDTH: 3.61 CM
LDLC SERPL CALC-MCNC: 56.8 MG/DL (ref 63–159)
LEFT ATRIUM SIZE: 3.22 CM
LEFT ATRIUM VOLUME INDEX MOD: 16.3 ML/M2
LEFT ATRIUM VOLUME INDEX: 29 ML/M2
LEFT ATRIUM VOLUME MOD: 28.22 CM3
LEFT ATRIUM VOLUME: 50.11 CM3
LEFT INTERNAL DIMENSION IN SYSTOLE: 2.84 CM (ref 2.1–4)
LEFT VENTRICLE DIASTOLIC VOLUME INDEX: 38.65 ML/M2
LEFT VENTRICLE DIASTOLIC VOLUME: 66.86 ML
LEFT VENTRICLE MASS INDEX: 90 G/M2
LEFT VENTRICLE SYSTOLIC VOLUME INDEX: 17.6 ML/M2
LEFT VENTRICLE SYSTOLIC VOLUME: 30.49 ML
LEFT VENTRICULAR INTERNAL DIMENSION IN DIASTOLE: 3.92 CM (ref 3.5–6)
LEFT VENTRICULAR MASS: 156.55 G
LV LATERAL E/E' RATIO: 5.91 M/S
LV SEPTAL E/E' RATIO: 8.13 M/S
MCH RBC QN AUTO: 33.9 PG (ref 27–31)
MCHC RBC AUTO-ENTMCNC: 35 G/DL (ref 32–36)
MCV RBC AUTO: 97 FL (ref 82–98)
MV A" WAVE DURATION": 14.56 MSEC
MV MEAN GRADIENT: 1 MMHG
MV PEAK A VEL: 0.95 M/S
MV PEAK E VEL: 0.65 M/S
MV PEAK GRADIENT: 3 MMHG
MV STENOSIS PRESSURE HALF TIME: 49.68 MS
MV VALVE AREA P 1/2 METHOD: 4.43 CM2
NONHDLC SERPL-MCNC: 70 MG/DL
PISA TR MAX VEL: 2.17 M/S
PLATELET # BLD AUTO: 190 K/UL (ref 150–450)
PMV BLD AUTO: 9.4 FL (ref 9.2–12.9)
POTASSIUM SERPL-SCNC: 3.3 MMOL/L (ref 3.5–5.1)
PROT SERPL-MCNC: 7 G/DL (ref 6–8.4)
PROTHROMBIN TIME: 10.8 SEC (ref 9–12.5)
PROTHROMBIN TIME: 10.9 SEC (ref 9–12.5)
PULM VEIN S/D RATIO: 1.85
PV PEAK D VEL: 0.34 M/S
PV PEAK S VEL: 0.63 M/S
PV PEAK VELOCITY: 1.02 CM/S
RA MAJOR: 4.77 CM
RA PRESSURE: 3 MMHG
RA WIDTH: 3.58 CM
RBC # BLD AUTO: 3.72 M/UL (ref 4–5.4)
RIGHT VENTRICULAR END-DIASTOLIC DIMENSION: 2.84 CM
SARS-COV-2 RDRP RESP QL NAA+PROBE: NEGATIVE
SODIUM SERPL-SCNC: 142 MMOL/L (ref 136–145)
TDI LATERAL: 0.11 M/S
TDI SEPTAL: 0.08 M/S
TDI: 0.1 M/S
TR MAX PG: 19 MMHG
TRIGL SERPL-MCNC: 66 MG/DL (ref 30–150)
TSH SERPL DL<=0.005 MIU/L-ACNC: 1.62 UIU/ML (ref 0.4–4)
TV REST PULMONARY ARTERY PRESSURE: 22 MMHG
WBC # BLD AUTO: 5.51 K/UL (ref 3.9–12.7)

## 2021-06-03 PROCEDURE — 70544 MR ANGIOGRAPHY HEAD W/O DYE: CPT | Mod: 26,,, | Performed by: RADIOLOGY

## 2021-06-03 PROCEDURE — 99223 PR INITIAL HOSPITAL CARE,LEVL III: ICD-10-PCS | Mod: ,,, | Performed by: NURSE PRACTITIONER

## 2021-06-03 PROCEDURE — 1159F MED LIST DOCD IN RCRD: CPT | Mod: S$GLB,,, | Performed by: PSYCHIATRY & NEUROLOGY

## 2021-06-03 PROCEDURE — 70551 MRI BRAIN STEM W/O DYE: CPT | Mod: TC

## 2021-06-03 PROCEDURE — 3288F PR FALLS RISK ASSESSMENT DOCUMENTED: ICD-10-PCS | Mod: CPTII,S$GLB,, | Performed by: PSYCHIATRY & NEUROLOGY

## 2021-06-03 PROCEDURE — C8929 TTE W OR WO FOL WCON,DOPPLER: HCPCS

## 2021-06-03 PROCEDURE — 1159F PR MEDICATION LIST DOCUMENTED IN MEDICAL RECORD: ICD-10-PCS | Mod: S$GLB,,, | Performed by: PSYCHIATRY & NEUROLOGY

## 2021-06-03 PROCEDURE — 99223 1ST HOSP IP/OBS HIGH 75: CPT | Mod: ,,, | Performed by: NURSE PRACTITIONER

## 2021-06-03 PROCEDURE — 99291 CRITICAL CARE FIRST HOUR: CPT | Mod: ,,, | Performed by: EMERGENCY MEDICINE

## 2021-06-03 PROCEDURE — 93306 ECHO (CUPID ONLY): ICD-10-PCS | Mod: 26,,, | Performed by: INTERNAL MEDICINE

## 2021-06-03 PROCEDURE — 70551 MRI BRAIN WITHOUT CONTRAST: ICD-10-PCS | Mod: 26,,, | Performed by: RADIOLOGY

## 2021-06-03 PROCEDURE — 84443 ASSAY THYROID STIM HORMONE: CPT | Performed by: NURSE PRACTITIONER

## 2021-06-03 PROCEDURE — 80061 LIPID PANEL: CPT | Performed by: NURSE PRACTITIONER

## 2021-06-03 PROCEDURE — 99999 PR PBB SHADOW E&M-EST. PATIENT-LVL V: CPT | Mod: PBBFAC,,, | Performed by: PSYCHIATRY & NEUROLOGY

## 2021-06-03 PROCEDURE — 99205 PR OFFICE/OUTPT VISIT, NEW, LEVL V, 60-74 MIN: ICD-10-PCS | Mod: S$GLB,,, | Performed by: PSYCHIATRY & NEUROLOGY

## 2021-06-03 PROCEDURE — 86900 BLOOD TYPING SEROLOGIC ABO: CPT | Performed by: STUDENT IN AN ORGANIZED HEALTH CARE EDUCATION/TRAINING PROGRAM

## 2021-06-03 PROCEDURE — U0002 COVID-19 LAB TEST NON-CDC: HCPCS | Performed by: STUDENT IN AN ORGANIZED HEALTH CARE EDUCATION/TRAINING PROGRAM

## 2021-06-03 PROCEDURE — 20000000 HC ICU ROOM

## 2021-06-03 PROCEDURE — 99205 OFFICE O/P NEW HI 60 MIN: CPT | Mod: S$GLB,,, | Performed by: PSYCHIATRY & NEUROLOGY

## 2021-06-03 PROCEDURE — 1125F AMNT PAIN NOTED PAIN PRSNT: CPT | Mod: S$GLB,,, | Performed by: PSYCHIATRY & NEUROLOGY

## 2021-06-03 PROCEDURE — 25000003 PHARM REV CODE 250: Performed by: STUDENT IN AN ORGANIZED HEALTH CARE EDUCATION/TRAINING PROGRAM

## 2021-06-03 PROCEDURE — 70544 MR ANGIOGRAPHY HEAD W/O DYE: CPT | Mod: TC

## 2021-06-03 PROCEDURE — 1100F PR PT FALLS ASSESS DOC 2+ FALLS/FALL W/INJURY/YR: ICD-10-PCS | Mod: CPTII,S$GLB,, | Performed by: PSYCHIATRY & NEUROLOGY

## 2021-06-03 PROCEDURE — 85027 COMPLETE CBC AUTOMATED: CPT | Performed by: EMERGENCY MEDICINE

## 2021-06-03 PROCEDURE — 85610 PROTHROMBIN TIME: CPT | Mod: 91 | Performed by: PHYSICIAN ASSISTANT

## 2021-06-03 PROCEDURE — 99291 CRITICAL CARE FIRST HOUR: CPT | Mod: 25

## 2021-06-03 PROCEDURE — 85730 THROMBOPLASTIN TIME PARTIAL: CPT | Performed by: PHYSICIAN ASSISTANT

## 2021-06-03 PROCEDURE — 83036 HEMOGLOBIN GLYCOSYLATED A1C: CPT | Performed by: NURSE PRACTITIONER

## 2021-06-03 PROCEDURE — 1125F PR PAIN SEVERITY QUANTIFIED, PAIN PRESENT: ICD-10-PCS | Mod: S$GLB,,, | Performed by: PSYCHIATRY & NEUROLOGY

## 2021-06-03 PROCEDURE — 99999 PR PBB SHADOW E&M-EST. PATIENT-LVL V: ICD-10-PCS | Mod: PBBFAC,,, | Performed by: PSYCHIATRY & NEUROLOGY

## 2021-06-03 PROCEDURE — 93306 TTE W/DOPPLER COMPLETE: CPT | Mod: 26,,, | Performed by: INTERNAL MEDICINE

## 2021-06-03 PROCEDURE — 80053 COMPREHEN METABOLIC PANEL: CPT | Performed by: EMERGENCY MEDICINE

## 2021-06-03 PROCEDURE — 70544 MRA BRAIN WITHOUT CONTRAST: ICD-10-PCS | Mod: 26,,, | Performed by: RADIOLOGY

## 2021-06-03 PROCEDURE — 3008F BODY MASS INDEX DOCD: CPT | Mod: CPTII,S$GLB,, | Performed by: PSYCHIATRY & NEUROLOGY

## 2021-06-03 PROCEDURE — 3288F FALL RISK ASSESSMENT DOCD: CPT | Mod: CPTII,S$GLB,, | Performed by: PSYCHIATRY & NEUROLOGY

## 2021-06-03 PROCEDURE — 70551 MRI BRAIN STEM W/O DYE: CPT | Mod: 26,,, | Performed by: RADIOLOGY

## 2021-06-03 PROCEDURE — 1100F PTFALLS ASSESS-DOCD GE2>/YR: CPT | Mod: CPTII,S$GLB,, | Performed by: PSYCHIATRY & NEUROLOGY

## 2021-06-03 PROCEDURE — 99291 PR CRITICAL CARE, E/M 30-74 MINUTES: ICD-10-PCS | Mod: ,,, | Performed by: EMERGENCY MEDICINE

## 2021-06-03 PROCEDURE — 85610 PROTHROMBIN TIME: CPT | Performed by: EMERGENCY MEDICINE

## 2021-06-03 PROCEDURE — 3008F PR BODY MASS INDEX (BMI) DOCUMENTED: ICD-10-PCS | Mod: CPTII,S$GLB,, | Performed by: PSYCHIATRY & NEUROLOGY

## 2021-06-03 PROCEDURE — 99283 EMERGENCY DEPT VISIT LOW MDM: CPT | Mod: 25

## 2021-06-03 RX ORDER — ROSUVASTATIN CALCIUM 20 MG/1
20 TABLET, COATED ORAL DAILY
Status: DISCONTINUED | OUTPATIENT
Start: 2021-06-04 | End: 2021-06-12 | Stop reason: HOSPADM

## 2021-06-03 RX ORDER — SODIUM CHLORIDE 0.9 % (FLUSH) 0.9 %
10 SYRINGE (ML) INJECTION
Status: DISCONTINUED | OUTPATIENT
Start: 2021-06-03 | End: 2021-06-12 | Stop reason: HOSPADM

## 2021-06-03 RX ORDER — CLOPIDOGREL 300 MG/1
300 TABLET, FILM COATED ORAL
Status: DISCONTINUED | OUTPATIENT
Start: 2021-06-03 | End: 2021-06-03

## 2021-06-03 RX ORDER — ONDANSETRON 2 MG/ML
4 INJECTION INTRAMUSCULAR; INTRAVENOUS EVERY 8 HOURS PRN
Status: DISCONTINUED | OUTPATIENT
Start: 2021-06-03 | End: 2021-06-12 | Stop reason: HOSPADM

## 2021-06-03 RX ORDER — LEVETIRACETAM 500 MG/1
500 TABLET ORAL 2 TIMES DAILY
Status: DISCONTINUED | OUTPATIENT
Start: 2021-06-03 | End: 2021-06-04

## 2021-06-03 RX ORDER — CLOPIDOGREL BISULFATE 75 MG/1
75 TABLET ORAL DAILY
Qty: 21 TABLET | Refills: 0 | OUTPATIENT
Start: 2021-06-03 | End: 2021-06-03

## 2021-06-03 RX ORDER — LABETALOL HCL 20 MG/4 ML
10 SYRINGE (ML) INTRAVENOUS EVERY 4 HOURS PRN
Status: DISCONTINUED | OUTPATIENT
Start: 2021-06-03 | End: 2021-06-05

## 2021-06-03 RX ADMIN — LEVETIRACETAM 500 MG: 500 TABLET ORAL at 08:06

## 2021-06-04 ENCOUNTER — ANESTHESIA (OUTPATIENT)
Dept: SURGERY | Facility: HOSPITAL | Age: 71
DRG: 026 | End: 2021-06-04
Payer: MEDICARE

## 2021-06-04 ENCOUNTER — PATIENT MESSAGE (OUTPATIENT)
Dept: INTERNAL MEDICINE | Facility: CLINIC | Age: 71
End: 2021-06-04

## 2021-06-04 PROBLEM — G93.5 BRAIN COMPRESSION: Status: ACTIVE | Noted: 2021-06-04

## 2021-06-04 LAB
ALBUMIN SERPL BCP-MCNC: 3.4 G/DL (ref 3.5–5.2)
ALP SERPL-CCNC: 58 U/L (ref 55–135)
ALT SERPL W/O P-5'-P-CCNC: 12 U/L (ref 10–44)
ANION GAP SERPL CALC-SCNC: 10 MMOL/L (ref 8–16)
ANION GAP SERPL CALC-SCNC: 13 MMOL/L (ref 8–16)
AST SERPL-CCNC: 17 U/L (ref 10–40)
BACTERIA #/AREA URNS AUTO: ABNORMAL /HPF
BASOPHILS # BLD AUTO: 0.02 K/UL (ref 0–0.2)
BASOPHILS # BLD AUTO: 0.02 K/UL (ref 0–0.2)
BASOPHILS NFR BLD: 0.4 % (ref 0–1.9)
BASOPHILS NFR BLD: 0.4 % (ref 0–1.9)
BILIRUB SERPL-MCNC: 0.6 MG/DL (ref 0.1–1)
BILIRUB UR QL STRIP: NEGATIVE
BUN SERPL-MCNC: 7 MG/DL (ref 8–23)
BUN SERPL-MCNC: 9 MG/DL (ref 8–23)
CALCIUM SERPL-MCNC: 9.1 MG/DL (ref 8.7–10.5)
CALCIUM SERPL-MCNC: 9.2 MG/DL (ref 8.7–10.5)
CHLORIDE SERPL-SCNC: 108 MMOL/L (ref 95–110)
CHLORIDE SERPL-SCNC: 110 MMOL/L (ref 95–110)
CLARITY UR REFRACT.AUTO: ABNORMAL
CO2 SERPL-SCNC: 22 MMOL/L (ref 23–29)
CO2 SERPL-SCNC: 24 MMOL/L (ref 23–29)
COLOR UR AUTO: ABNORMAL
CREAT SERPL-MCNC: 0.7 MG/DL (ref 0.5–1.4)
CREAT SERPL-MCNC: 0.7 MG/DL (ref 0.5–1.4)
DIFFERENTIAL METHOD: ABNORMAL
DIFFERENTIAL METHOD: ABNORMAL
EOSINOPHIL # BLD AUTO: 0 K/UL (ref 0–0.5)
EOSINOPHIL # BLD AUTO: 0.1 K/UL (ref 0–0.5)
EOSINOPHIL NFR BLD: 0.8 % (ref 0–8)
EOSINOPHIL NFR BLD: 1.9 % (ref 0–8)
ERYTHROCYTE [DISTWIDTH] IN BLOOD BY AUTOMATED COUNT: 13.1 % (ref 11.5–14.5)
ERYTHROCYTE [DISTWIDTH] IN BLOOD BY AUTOMATED COUNT: 13.2 % (ref 11.5–14.5)
EST. GFR  (AFRICAN AMERICAN): >60 ML/MIN/1.73 M^2
EST. GFR  (AFRICAN AMERICAN): >60 ML/MIN/1.73 M^2
EST. GFR  (NON AFRICAN AMERICAN): >60 ML/MIN/1.73 M^2
EST. GFR  (NON AFRICAN AMERICAN): >60 ML/MIN/1.73 M^2
ESTIMATED AVG GLUCOSE: 111 MG/DL (ref 68–131)
GLUCOSE SERPL-MCNC: 108 MG/DL (ref 70–110)
GLUCOSE SERPL-MCNC: 94 MG/DL (ref 70–110)
GLUCOSE UR QL STRIP: NEGATIVE
HBA1C MFR BLD: 5.5 % (ref 4–5.6)
HCT VFR BLD AUTO: 30.8 % (ref 37–48.5)
HCT VFR BLD AUTO: 31.4 % (ref 37–48.5)
HGB BLD-MCNC: 10.3 G/DL (ref 12–16)
HGB BLD-MCNC: 10.6 G/DL (ref 12–16)
HGB UR QL STRIP: NEGATIVE
IMM GRANULOCYTES # BLD AUTO: 0.01 K/UL (ref 0–0.04)
IMM GRANULOCYTES # BLD AUTO: 0.02 K/UL (ref 0–0.04)
IMM GRANULOCYTES NFR BLD AUTO: 0.2 % (ref 0–0.5)
IMM GRANULOCYTES NFR BLD AUTO: 0.4 % (ref 0–0.5)
KETONES UR QL STRIP: NEGATIVE
LEUKOCYTE ESTERASE UR QL STRIP: ABNORMAL
LYMPHOCYTES # BLD AUTO: 1.2 K/UL (ref 1–4.8)
LYMPHOCYTES # BLD AUTO: 1.5 K/UL (ref 1–4.8)
LYMPHOCYTES NFR BLD: 23.8 % (ref 18–48)
LYMPHOCYTES NFR BLD: 31.1 % (ref 18–48)
MAGNESIUM SERPL-MCNC: 1.8 MG/DL (ref 1.6–2.6)
MCH RBC QN AUTO: 32.3 PG (ref 27–31)
MCH RBC QN AUTO: 32.6 PG (ref 27–31)
MCHC RBC AUTO-ENTMCNC: 33.4 G/DL (ref 32–36)
MCHC RBC AUTO-ENTMCNC: 33.8 G/DL (ref 32–36)
MCV RBC AUTO: 97 FL (ref 82–98)
MCV RBC AUTO: 97 FL (ref 82–98)
MICROSCOPIC COMMENT: ABNORMAL
MONOCYTES # BLD AUTO: 0.4 K/UL (ref 0.3–1)
MONOCYTES # BLD AUTO: 0.5 K/UL (ref 0.3–1)
MONOCYTES NFR BLD: 10.1 % (ref 4–15)
MONOCYTES NFR BLD: 8.8 % (ref 4–15)
NEUTROPHILS # BLD AUTO: 2.7 K/UL (ref 1.8–7.7)
NEUTROPHILS # BLD AUTO: 3.3 K/UL (ref 1.8–7.7)
NEUTROPHILS NFR BLD: 56.3 % (ref 38–73)
NEUTROPHILS NFR BLD: 65.8 % (ref 38–73)
NITRITE UR QL STRIP: NEGATIVE
NON-SQ EPI CELLS #/AREA URNS AUTO: <1 /HPF
NRBC BLD-RTO: 0 /100 WBC
NRBC BLD-RTO: 0 /100 WBC
PH UR STRIP: 6 [PH] (ref 5–8)
PHOSPHATE SERPL-MCNC: 4.3 MG/DL (ref 2.7–4.5)
PLATELET # BLD AUTO: 156 K/UL (ref 150–450)
PLATELET # BLD AUTO: 162 K/UL (ref 150–450)
PMV BLD AUTO: 9.5 FL (ref 9.2–12.9)
PMV BLD AUTO: 9.5 FL (ref 9.2–12.9)
POCT GLUCOSE: 93 MG/DL (ref 70–110)
POTASSIUM SERPL-SCNC: 3.4 MMOL/L (ref 3.5–5.1)
POTASSIUM SERPL-SCNC: 3.7 MMOL/L (ref 3.5–5.1)
POTASSIUM SERPL-SCNC: 3.8 MMOL/L (ref 3.5–5.1)
PROT SERPL-MCNC: 6.1 G/DL (ref 6–8.4)
PROT UR QL STRIP: NEGATIVE
RBC # BLD AUTO: 3.19 M/UL (ref 4–5.4)
RBC # BLD AUTO: 3.25 M/UL (ref 4–5.4)
RBC #/AREA URNS AUTO: 2 /HPF (ref 0–4)
SODIUM SERPL-SCNC: 143 MMOL/L (ref 136–145)
SODIUM SERPL-SCNC: 144 MMOL/L (ref 136–145)
SP GR UR STRIP: 1 (ref 1–1.03)
SQUAMOUS #/AREA URNS AUTO: 1 /HPF
URN SPEC COLLECT METH UR: ABNORMAL
WBC # BLD AUTO: 4.73 K/UL (ref 3.9–12.7)
WBC # BLD AUTO: 5.01 K/UL (ref 3.9–12.7)
WBC #/AREA URNS AUTO: 32 /HPF (ref 0–5)

## 2021-06-04 PROCEDURE — 99233 PR SUBSEQUENT HOSPITAL CARE,LEVL III: ICD-10-PCS | Mod: GC,,, | Performed by: PSYCHIATRY & NEUROLOGY

## 2021-06-04 PROCEDURE — D9220A PRA ANESTHESIA: ICD-10-PCS | Mod: ANES,,, | Performed by: ANESTHESIOLOGY

## 2021-06-04 PROCEDURE — 37000009 HC ANESTHESIA EA ADD 15 MINS: Performed by: NEUROLOGICAL SURGERY

## 2021-06-04 PROCEDURE — 87086 URINE CULTURE/COLONY COUNT: CPT | Performed by: NURSE PRACTITIONER

## 2021-06-04 PROCEDURE — 27800505 HC CATH, RADIAL ARTERY KIT: Performed by: ANESTHESIOLOGY

## 2021-06-04 PROCEDURE — 37000008 HC ANESTHESIA 1ST 15 MINUTES: Performed by: NEUROLOGICAL SURGERY

## 2021-06-04 PROCEDURE — 25000003 PHARM REV CODE 250: Performed by: NURSE PRACTITIONER

## 2021-06-04 PROCEDURE — D9220A PRA ANESTHESIA: Mod: CRNA,,, | Performed by: NURSE ANESTHETIST, CERTIFIED REGISTERED

## 2021-06-04 PROCEDURE — C1713 ANCHOR/SCREW BN/BN,TIS/BN: HCPCS | Performed by: NEUROLOGICAL SURGERY

## 2021-06-04 PROCEDURE — 25000003 PHARM REV CODE 250

## 2021-06-04 PROCEDURE — 63600175 PHARM REV CODE 636 W HCPCS: Performed by: NEUROLOGICAL SURGERY

## 2021-06-04 PROCEDURE — 25000003 PHARM REV CODE 250: Performed by: STUDENT IN AN ORGANIZED HEALTH CARE EDUCATION/TRAINING PROGRAM

## 2021-06-04 PROCEDURE — 20000000 HC ICU ROOM

## 2021-06-04 PROCEDURE — 99223 PR INITIAL HOSPITAL CARE,LEVL III: ICD-10-PCS | Mod: 57,GC,, | Performed by: NEUROLOGICAL SURGERY

## 2021-06-04 PROCEDURE — 25000003 PHARM REV CODE 250: Performed by: NEUROLOGICAL SURGERY

## 2021-06-04 PROCEDURE — 63600175 PHARM REV CODE 636 W HCPCS: Performed by: STUDENT IN AN ORGANIZED HEALTH CARE EDUCATION/TRAINING PROGRAM

## 2021-06-04 PROCEDURE — 63600175 PHARM REV CODE 636 W HCPCS: Performed by: NURSE ANESTHETIST, CERTIFIED REGISTERED

## 2021-06-04 PROCEDURE — 85025 COMPLETE CBC W/AUTO DIFF WBC: CPT | Performed by: NURSE PRACTITIONER

## 2021-06-04 PROCEDURE — 27200677 HC TRANSDUCER MONITOR KIT SINGLE: Performed by: ANESTHESIOLOGY

## 2021-06-04 PROCEDURE — 25000003 PHARM REV CODE 250: Performed by: NURSE ANESTHETIST, CERTIFIED REGISTERED

## 2021-06-04 PROCEDURE — 83735 ASSAY OF MAGNESIUM: CPT | Performed by: NURSE PRACTITIONER

## 2021-06-04 PROCEDURE — 80053 COMPREHEN METABOLIC PANEL: CPT | Performed by: NURSE PRACTITIONER

## 2021-06-04 PROCEDURE — 36620 INSERTION CATHETER ARTERY: CPT | Mod: 59,,, | Performed by: ANESTHESIOLOGY

## 2021-06-04 PROCEDURE — D9220A PRA ANESTHESIA: Mod: ANES,,, | Performed by: ANESTHESIOLOGY

## 2021-06-04 PROCEDURE — 99233 SBSQ HOSP IP/OBS HIGH 50: CPT | Mod: GC,,, | Performed by: PSYCHIATRY & NEUROLOGY

## 2021-06-04 PROCEDURE — 99223 1ST HOSP IP/OBS HIGH 75: CPT | Mod: 57,GC,, | Performed by: NEUROLOGICAL SURGERY

## 2021-06-04 PROCEDURE — 84100 ASSAY OF PHOSPHORUS: CPT | Performed by: NURSE PRACTITIONER

## 2021-06-04 PROCEDURE — 61312 PR OPEN SKULL EVAC HEMATOMA, SUPRATENTORIAL, SUB/ EXTRADURAL: ICD-10-PCS | Mod: 51,,, | Performed by: NEUROLOGICAL SURGERY

## 2021-06-04 PROCEDURE — C1729 CATH, DRAINAGE: HCPCS | Performed by: NEUROLOGICAL SURGERY

## 2021-06-04 PROCEDURE — 84132 ASSAY OF SERUM POTASSIUM: CPT | Performed by: PSYCHIATRY & NEUROLOGY

## 2021-06-04 PROCEDURE — 36620 PR INSERT CATH,ART,PERCUT,SHORTTERM: ICD-10-PCS | Mod: 59,,, | Performed by: ANESTHESIOLOGY

## 2021-06-04 PROCEDURE — 36000711: Performed by: NEUROLOGICAL SURGERY

## 2021-06-04 PROCEDURE — 61312 CRNEC/CRNOT STTL XDRL/SDRL: CPT | Mod: ,,, | Performed by: NEUROLOGICAL SURGERY

## 2021-06-04 PROCEDURE — 36000710: Performed by: NEUROLOGICAL SURGERY

## 2021-06-04 PROCEDURE — 27201423 OPTIME MED/SURG SUP & DEVICES STERILE SUPPLY: Performed by: NEUROLOGICAL SURGERY

## 2021-06-04 PROCEDURE — 85025 COMPLETE CBC W/AUTO DIFF WBC: CPT | Mod: 91 | Performed by: STUDENT IN AN ORGANIZED HEALTH CARE EDUCATION/TRAINING PROGRAM

## 2021-06-04 PROCEDURE — 63600175 PHARM REV CODE 636 W HCPCS: Performed by: NURSE PRACTITIONER

## 2021-06-04 PROCEDURE — D9220A PRA ANESTHESIA: ICD-10-PCS | Mod: CRNA,,, | Performed by: NURSE ANESTHETIST, CERTIFIED REGISTERED

## 2021-06-04 PROCEDURE — 94761 N-INVAS EAR/PLS OXIMETRY MLT: CPT

## 2021-06-04 PROCEDURE — 80048 BASIC METABOLIC PNL TOTAL CA: CPT | Performed by: STUDENT IN AN ORGANIZED HEALTH CARE EDUCATION/TRAINING PROGRAM

## 2021-06-04 PROCEDURE — 27201037 HC PRESSURE MONITORING SET UP

## 2021-06-04 PROCEDURE — 81001 URINALYSIS AUTO W/SCOPE: CPT | Performed by: NURSE PRACTITIONER

## 2021-06-04 DEVICE — SCREW UN3 AXS SD 1.5X4MM: Type: IMPLANTABLE DEVICE | Site: SCALP | Status: FUNCTIONAL

## 2021-06-04 DEVICE — PLATE BONE BUR HOLE COVER 10MM: Type: IMPLANTABLE DEVICE | Site: SCALP | Status: FUNCTIONAL

## 2021-06-04 DEVICE — PLATE BONE 2X2 HOLE SM BOX: Type: IMPLANTABLE DEVICE | Site: SCALP | Status: FUNCTIONAL

## 2021-06-04 RX ORDER — LIDOCAINE HYDROCHLORIDE AND EPINEPHRINE 10; 10 MG/ML; UG/ML
INJECTION, SOLUTION INFILTRATION; PERINEURAL
Status: DISCONTINUED | OUTPATIENT
Start: 2021-06-04 | End: 2021-06-04 | Stop reason: HOSPADM

## 2021-06-04 RX ORDER — ONDANSETRON 2 MG/ML
INJECTION INTRAMUSCULAR; INTRAVENOUS
Status: DISCONTINUED | OUTPATIENT
Start: 2021-06-04 | End: 2021-06-04

## 2021-06-04 RX ORDER — HYDROMORPHONE HYDROCHLORIDE 2 MG/ML
INJECTION, SOLUTION INTRAMUSCULAR; INTRAVENOUS; SUBCUTANEOUS
Status: DISCONTINUED | OUTPATIENT
Start: 2021-06-04 | End: 2021-06-04

## 2021-06-04 RX ORDER — DEXAMETHASONE SODIUM PHOSPHATE 4 MG/ML
INJECTION, SOLUTION INTRA-ARTICULAR; INTRALESIONAL; INTRAMUSCULAR; INTRAVENOUS; SOFT TISSUE
Status: DISCONTINUED | OUTPATIENT
Start: 2021-06-04 | End: 2021-06-04

## 2021-06-04 RX ORDER — ACETAMINOPHEN 325 MG/1
650 TABLET ORAL EVERY 4 HOURS PRN
Status: DISCONTINUED | OUTPATIENT
Start: 2021-06-04 | End: 2021-06-12 | Stop reason: HOSPADM

## 2021-06-04 RX ORDER — ROCURONIUM BROMIDE 10 MG/ML
INJECTION, SOLUTION INTRAVENOUS
Status: DISCONTINUED | OUTPATIENT
Start: 2021-06-04 | End: 2021-06-04

## 2021-06-04 RX ORDER — PHENYLEPHRINE HCL IN 0.9% NACL 1 MG/10 ML
SYRINGE (ML) INTRAVENOUS
Status: DISCONTINUED | OUTPATIENT
Start: 2021-06-04 | End: 2021-06-04

## 2021-06-04 RX ORDER — ACETAMINOPHEN 325 MG/1
650 TABLET ORAL EVERY 4 HOURS PRN
Status: DISCONTINUED | OUTPATIENT
Start: 2021-06-04 | End: 2021-06-06

## 2021-06-04 RX ORDER — LANOLIN ALCOHOL/MO/W.PET/CERES
800 CREAM (GRAM) TOPICAL
Status: DISCONTINUED | OUTPATIENT
Start: 2021-06-04 | End: 2021-06-10 | Stop reason: ALTCHOICE

## 2021-06-04 RX ORDER — LEVETIRACETAM 500 MG/1
500 TABLET ORAL 2 TIMES DAILY
Status: DISPENSED | OUTPATIENT
Start: 2021-06-04 | End: 2021-06-11

## 2021-06-04 RX ORDER — ONDANSETRON 8 MG/1
8 TABLET, ORALLY DISINTEGRATING ORAL EVERY 8 HOURS PRN
Status: DISCONTINUED | OUTPATIENT
Start: 2021-06-04 | End: 2021-06-12 | Stop reason: HOSPADM

## 2021-06-04 RX ORDER — SODIUM CHLORIDE 9 MG/ML
INJECTION, SOLUTION INTRAVENOUS CONTINUOUS
Status: DISCONTINUED | OUTPATIENT
Start: 2021-06-04 | End: 2021-06-04

## 2021-06-04 RX ORDER — BACITRACIN ZINC 500 UNIT/G
OINTMENT (GRAM) TOPICAL
Status: DISCONTINUED | OUTPATIENT
Start: 2021-06-04 | End: 2021-06-04 | Stop reason: HOSPADM

## 2021-06-04 RX ORDER — MUPIROCIN 20 MG/G
OINTMENT TOPICAL 2 TIMES DAILY
Status: DISCONTINUED | OUTPATIENT
Start: 2021-06-04 | End: 2021-06-04

## 2021-06-04 RX ORDER — CEFTRIAXONE 1 G/1
INJECTION, POWDER, FOR SOLUTION INTRAMUSCULAR; INTRAVENOUS
Status: DISCONTINUED | OUTPATIENT
Start: 2021-06-04 | End: 2021-06-04 | Stop reason: HOSPADM

## 2021-06-04 RX ORDER — HYDROCODONE BITARTRATE AND ACETAMINOPHEN 5; 325 MG/1; MG/1
1 TABLET ORAL EVERY 4 HOURS PRN
Status: DISCONTINUED | OUTPATIENT
Start: 2021-06-04 | End: 2021-06-12 | Stop reason: HOSPADM

## 2021-06-04 RX ORDER — SODIUM,POTASSIUM PHOSPHATES 280-250MG
2 POWDER IN PACKET (EA) ORAL
Status: DISCONTINUED | OUTPATIENT
Start: 2021-06-04 | End: 2021-06-10 | Stop reason: ALTCHOICE

## 2021-06-04 RX ORDER — FENTANYL CITRATE 50 UG/ML
INJECTION, SOLUTION INTRAMUSCULAR; INTRAVENOUS
Status: DISCONTINUED | OUTPATIENT
Start: 2021-06-04 | End: 2021-06-04

## 2021-06-04 RX ORDER — LIDOCAINE HYDROCHLORIDE 20 MG/ML
INJECTION, SOLUTION EPIDURAL; INFILTRATION; INTRACAUDAL; PERINEURAL
Status: DISCONTINUED | OUTPATIENT
Start: 2021-06-04 | End: 2021-06-04

## 2021-06-04 RX ORDER — NICARDIPINE HYDROCHLORIDE 2.5 MG/ML
INJECTION INTRAVENOUS
Status: DISCONTINUED | OUTPATIENT
Start: 2021-06-04 | End: 2021-06-04

## 2021-06-04 RX ORDER — MUPIROCIN 20 MG/G
OINTMENT TOPICAL 2 TIMES DAILY
Status: DISPENSED | OUTPATIENT
Start: 2021-06-04 | End: 2021-06-09

## 2021-06-04 RX ORDER — PROPOFOL 10 MG/ML
VIAL (ML) INTRAVENOUS
Status: DISCONTINUED | OUTPATIENT
Start: 2021-06-04 | End: 2021-06-04

## 2021-06-04 RX ORDER — NICARDIPINE HYDROCHLORIDE 0.2 MG/ML
INJECTION INTRAVENOUS
Status: COMPLETED
Start: 2021-06-04 | End: 2021-06-04

## 2021-06-04 RX ORDER — LEVETIRACETAM 500 MG/5ML
INJECTION, SOLUTION, CONCENTRATE INTRAVENOUS
Status: DISCONTINUED | OUTPATIENT
Start: 2021-06-04 | End: 2021-06-04

## 2021-06-04 RX ORDER — CEFAZOLIN SODIUM 1 G/3ML
2 INJECTION, POWDER, FOR SOLUTION INTRAMUSCULAR; INTRAVENOUS
Status: DISCONTINUED | OUTPATIENT
Start: 2021-06-04 | End: 2021-06-07

## 2021-06-04 RX ORDER — PHENYLEPHRINE HYDROCHLORIDE 10 MG/ML
INJECTION INTRAVENOUS
Status: DISCONTINUED | OUTPATIENT
Start: 2021-06-04 | End: 2021-06-04

## 2021-06-04 RX ORDER — NICARDIPINE HYDROCHLORIDE 0.2 MG/ML
0-15 INJECTION INTRAVENOUS CONTINUOUS
Status: DISCONTINUED | OUTPATIENT
Start: 2021-06-04 | End: 2021-06-05

## 2021-06-04 RX ORDER — POTASSIUM CHLORIDE 7.45 MG/ML
20 INJECTION INTRAVENOUS ONCE
Status: COMPLETED | OUTPATIENT
Start: 2021-06-04 | End: 2021-06-04

## 2021-06-04 RX ADMIN — ONDANSETRON 4 MG: 2 INJECTION INTRAMUSCULAR; INTRAVENOUS at 05:06

## 2021-06-04 RX ADMIN — NICARDIPINE HYDROCHLORIDE 2.5 MG: 0.2 INJECTION, SOLUTION INTRAVENOUS at 07:06

## 2021-06-04 RX ADMIN — LISINOPRIL 30 MG: 20 TABLET ORAL at 08:06

## 2021-06-04 RX ADMIN — MUPIROCIN: 20 OINTMENT TOPICAL at 08:06

## 2021-06-04 RX ADMIN — Medication 200 MCG: at 02:06

## 2021-06-04 RX ADMIN — POTASSIUM CHLORIDE 10 MEQ: 7.46 INJECTION, SOLUTION INTRAVENOUS at 10:06

## 2021-06-04 RX ADMIN — SODIUM CHLORIDE 100 ML/HR: 0.9 INJECTION, SOLUTION INTRAVENOUS at 06:06

## 2021-06-04 RX ADMIN — SODIUM CHLORIDE, SODIUM GLUCONATE, SODIUM ACETATE, POTASSIUM CHLORIDE, MAGNESIUM CHLORIDE, SODIUM PHOSPHATE, DIBASIC, AND POTASSIUM PHOSPHATE: .53; .5; .37; .037; .03; .012; .00082 INJECTION, SOLUTION INTRAVENOUS at 05:06

## 2021-06-04 RX ADMIN — SODIUM CHLORIDE 0.3 MCG/KG/MIN: 9 INJECTION, SOLUTION INTRAVENOUS at 03:06

## 2021-06-04 RX ADMIN — FENTANYL CITRATE 25 MCG: 50 INJECTION INTRAMUSCULAR; INTRAVENOUS at 02:06

## 2021-06-04 RX ADMIN — PROPOFOL 50 MG: 10 INJECTION, EMULSION INTRAVENOUS at 02:06

## 2021-06-04 RX ADMIN — HYDROMORPHONE HYDROCHLORIDE 1 MG: 2 INJECTION, SOLUTION INTRAMUSCULAR; INTRAVENOUS; SUBCUTANEOUS at 03:06

## 2021-06-04 RX ADMIN — NICARDIPINE HYDROCHLORIDE 2.5 MG: 2.5 INJECTION INTRAVENOUS at 03:06

## 2021-06-04 RX ADMIN — ACETAMINOPHEN 650 MG: 325 TABLET ORAL at 01:06

## 2021-06-04 RX ADMIN — ACETAMINOPHEN 650 MG: 325 TABLET ORAL at 08:06

## 2021-06-04 RX ADMIN — LEVETIRACETAM 500 MG: 500 TABLET ORAL at 08:06

## 2021-06-04 RX ADMIN — Medication 100 MCG: at 02:06

## 2021-06-04 RX ADMIN — PROPOFOL 150 MG: 10 INJECTION, EMULSION INTRAVENOUS at 02:06

## 2021-06-04 RX ADMIN — ROCURONIUM BROMIDE 35 MG: 10 INJECTION, SOLUTION INTRAVENOUS at 02:06

## 2021-06-04 RX ADMIN — LIDOCAINE HYDROCHLORIDE 100 MG: 20 INJECTION, SOLUTION EPIDURAL; INFILTRATION; INTRACAUDAL at 02:06

## 2021-06-04 RX ADMIN — SODIUM CHLORIDE, SODIUM GLUCONATE, SODIUM ACETATE, POTASSIUM CHLORIDE, MAGNESIUM CHLORIDE, SODIUM PHOSPHATE, DIBASIC, AND POTASSIUM PHOSPHATE: .53; .5; .37; .037; .03; .012; .00082 INJECTION, SOLUTION INTRAVENOUS at 02:06

## 2021-06-04 RX ADMIN — FENTANYL CITRATE 75 MCG: 50 INJECTION INTRAMUSCULAR; INTRAVENOUS at 02:06

## 2021-06-04 RX ADMIN — CEFAZOLIN 2 G: 330 INJECTION, POWDER, FOR SOLUTION INTRAMUSCULAR; INTRAVENOUS at 06:06

## 2021-06-04 RX ADMIN — FENTANYL CITRATE 100 MCG: 50 INJECTION INTRAMUSCULAR; INTRAVENOUS at 03:06

## 2021-06-04 RX ADMIN — NICARDIPINE HYDROCHLORIDE 2.5 MG/HR: 0.2 INJECTION, SOLUTION INTRAVENOUS at 07:06

## 2021-06-04 RX ADMIN — ROSUVASTATIN CALCIUM 20 MG: 20 TABLET, FILM COATED ORAL at 08:06

## 2021-06-04 RX ADMIN — DEXAMETHASONE SODIUM PHOSPHATE 12 MG: 4 INJECTION, SOLUTION INTRAMUSCULAR; INTRAVENOUS at 04:06

## 2021-06-04 RX ADMIN — LEVETIRACETAM 1000 MG: 100 INJECTION, SOLUTION, CONCENTRATE INTRAVENOUS at 02:06

## 2021-06-05 ENCOUNTER — ANESTHESIA EVENT (OUTPATIENT)
Dept: SURGERY | Facility: HOSPITAL | Age: 71
DRG: 026 | End: 2021-06-05
Payer: MEDICARE

## 2021-06-05 LAB
ALBUMIN SERPL BCP-MCNC: 3.4 G/DL (ref 3.5–5.2)
ALP SERPL-CCNC: 59 U/L (ref 55–135)
ALT SERPL W/O P-5'-P-CCNC: 16 U/L (ref 10–44)
ANION GAP SERPL CALC-SCNC: 14 MMOL/L (ref 8–16)
AST SERPL-CCNC: 23 U/L (ref 10–40)
BACTERIA UR CULT: NORMAL
BACTERIA UR CULT: NORMAL
BASOPHILS # BLD AUTO: 0 K/UL (ref 0–0.2)
BASOPHILS NFR BLD: 0 % (ref 0–1.9)
BILIRUB SERPL-MCNC: 0.3 MG/DL (ref 0.1–1)
BUN SERPL-MCNC: 10 MG/DL (ref 8–23)
CALCIUM SERPL-MCNC: 9.4 MG/DL (ref 8.7–10.5)
CHLORIDE SERPL-SCNC: 108 MMOL/L (ref 95–110)
CO2 SERPL-SCNC: 20 MMOL/L (ref 23–29)
CREAT SERPL-MCNC: 0.7 MG/DL (ref 0.5–1.4)
DIFFERENTIAL METHOD: ABNORMAL
EOSINOPHIL # BLD AUTO: 0 K/UL (ref 0–0.5)
EOSINOPHIL NFR BLD: 0 % (ref 0–8)
ERYTHROCYTE [DISTWIDTH] IN BLOOD BY AUTOMATED COUNT: 12.8 % (ref 11.5–14.5)
EST. GFR  (AFRICAN AMERICAN): >60 ML/MIN/1.73 M^2
EST. GFR  (NON AFRICAN AMERICAN): >60 ML/MIN/1.73 M^2
GLUCOSE SERPL-MCNC: 141 MG/DL (ref 70–110)
HCT VFR BLD AUTO: 32.7 % (ref 37–48.5)
HGB BLD-MCNC: 11 G/DL (ref 12–16)
IMM GRANULOCYTES # BLD AUTO: 0.02 K/UL (ref 0–0.04)
IMM GRANULOCYTES NFR BLD AUTO: 0.3 % (ref 0–0.5)
LYMPHOCYTES # BLD AUTO: 0.4 K/UL (ref 1–4.8)
LYMPHOCYTES NFR BLD: 5.8 % (ref 18–48)
MAGNESIUM SERPL-MCNC: 1.9 MG/DL (ref 1.6–2.6)
MCH RBC QN AUTO: 32.7 PG (ref 27–31)
MCHC RBC AUTO-ENTMCNC: 33.6 G/DL (ref 32–36)
MCV RBC AUTO: 97 FL (ref 82–98)
MONOCYTES # BLD AUTO: 0.2 K/UL (ref 0.3–1)
MONOCYTES NFR BLD: 3.2 % (ref 4–15)
NEUTROPHILS # BLD AUTO: 5.5 K/UL (ref 1.8–7.7)
NEUTROPHILS NFR BLD: 90.7 % (ref 38–73)
NRBC BLD-RTO: 0 /100 WBC
PHOSPHATE SERPL-MCNC: 3.9 MG/DL (ref 2.7–4.5)
PLATELET # BLD AUTO: 172 K/UL (ref 150–450)
PMV BLD AUTO: 9.8 FL (ref 9.2–12.9)
POTASSIUM SERPL-SCNC: 3.8 MMOL/L (ref 3.5–5.1)
POTASSIUM SERPL-SCNC: 4 MMOL/L (ref 3.5–5.1)
PROT SERPL-MCNC: 6.3 G/DL (ref 6–8.4)
RBC # BLD AUTO: 3.36 M/UL (ref 4–5.4)
SODIUM SERPL-SCNC: 142 MMOL/L (ref 136–145)
WBC # BLD AUTO: 6.01 K/UL (ref 3.9–12.7)

## 2021-06-05 PROCEDURE — 99233 SBSQ HOSP IP/OBS HIGH 50: CPT | Mod: GC,,, | Performed by: PSYCHIATRY & NEUROLOGY

## 2021-06-05 PROCEDURE — 27000221 HC OXYGEN, UP TO 24 HOURS

## 2021-06-05 PROCEDURE — 99233 PR SUBSEQUENT HOSPITAL CARE,LEVL III: ICD-10-PCS | Mod: GC,,, | Performed by: PSYCHIATRY & NEUROLOGY

## 2021-06-05 PROCEDURE — 84132 ASSAY OF SERUM POTASSIUM: CPT | Performed by: PSYCHIATRY & NEUROLOGY

## 2021-06-05 PROCEDURE — 85025 COMPLETE CBC W/AUTO DIFF WBC: CPT | Performed by: STUDENT IN AN ORGANIZED HEALTH CARE EDUCATION/TRAINING PROGRAM

## 2021-06-05 PROCEDURE — 84100 ASSAY OF PHOSPHORUS: CPT | Performed by: NURSE PRACTITIONER

## 2021-06-05 PROCEDURE — 25000003 PHARM REV CODE 250: Performed by: NURSE PRACTITIONER

## 2021-06-05 PROCEDURE — 25000003 PHARM REV CODE 250: Performed by: PHYSICIAN ASSISTANT

## 2021-06-05 PROCEDURE — 94761 N-INVAS EAR/PLS OXIMETRY MLT: CPT

## 2021-06-05 PROCEDURE — 99900035 HC TECH TIME PER 15 MIN (STAT)

## 2021-06-05 PROCEDURE — 20000000 HC ICU ROOM

## 2021-06-05 PROCEDURE — 83735 ASSAY OF MAGNESIUM: CPT | Performed by: NURSE PRACTITIONER

## 2021-06-05 PROCEDURE — 25000003 PHARM REV CODE 250: Performed by: STUDENT IN AN ORGANIZED HEALTH CARE EDUCATION/TRAINING PROGRAM

## 2021-06-05 PROCEDURE — 63600175 PHARM REV CODE 636 W HCPCS: Performed by: STUDENT IN AN ORGANIZED HEALTH CARE EDUCATION/TRAINING PROGRAM

## 2021-06-05 PROCEDURE — 80053 COMPREHEN METABOLIC PANEL: CPT | Performed by: NURSE PRACTITIONER

## 2021-06-05 PROCEDURE — 92610 EVALUATE SWALLOWING FUNCTION: CPT

## 2021-06-05 RX ORDER — HYDRALAZINE HYDROCHLORIDE 20 MG/ML
10 INJECTION INTRAMUSCULAR; INTRAVENOUS EVERY 6 HOURS PRN
Status: DISCONTINUED | OUTPATIENT
Start: 2021-06-05 | End: 2021-06-12 | Stop reason: HOSPADM

## 2021-06-05 RX ORDER — PANTOPRAZOLE SODIUM 40 MG/1
40 TABLET, DELAYED RELEASE ORAL DAILY
Status: DISCONTINUED | OUTPATIENT
Start: 2021-06-05 | End: 2021-06-05

## 2021-06-05 RX ORDER — LISINOPRIL 10 MG/1
10 TABLET ORAL ONCE
Status: COMPLETED | OUTPATIENT
Start: 2021-06-05 | End: 2021-06-05

## 2021-06-05 RX ORDER — LISINOPRIL 20 MG/1
40 TABLET ORAL DAILY
Status: DISCONTINUED | OUTPATIENT
Start: 2021-06-06 | End: 2021-06-09

## 2021-06-05 RX ADMIN — CEFAZOLIN 2 G: 330 INJECTION, POWDER, FOR SOLUTION INTRAMUSCULAR; INTRAVENOUS at 12:06

## 2021-06-05 RX ADMIN — MUPIROCIN: 20 OINTMENT TOPICAL at 08:06

## 2021-06-05 RX ADMIN — CEFAZOLIN 2 G: 330 INJECTION, POWDER, FOR SOLUTION INTRAMUSCULAR; INTRAVENOUS at 08:06

## 2021-06-05 RX ADMIN — POTASSIUM BICARBONATE 50 MEQ: 978 TABLET, EFFERVESCENT ORAL at 05:06

## 2021-06-05 RX ADMIN — CEFAZOLIN 2 G: 330 INJECTION, POWDER, FOR SOLUTION INTRAMUSCULAR; INTRAVENOUS at 06:06

## 2021-06-05 RX ADMIN — LABETALOL HYDROCHLORIDE 10 MG: 5 INJECTION, SOLUTION INTRAVENOUS at 08:06

## 2021-06-05 RX ADMIN — ACETAMINOPHEN 650 MG: 325 TABLET ORAL at 08:06

## 2021-06-05 RX ADMIN — ROSUVASTATIN CALCIUM 20 MG: 20 TABLET, FILM COATED ORAL at 08:06

## 2021-06-05 RX ADMIN — LISINOPRIL 10 MG: 10 TABLET ORAL at 01:06

## 2021-06-05 RX ADMIN — ACETAMINOPHEN 650 MG: 325 TABLET ORAL at 04:06

## 2021-06-05 RX ADMIN — LEVETIRACETAM 500 MG: 500 TABLET ORAL at 08:06

## 2021-06-05 RX ADMIN — LISINOPRIL 30 MG: 20 TABLET ORAL at 08:06

## 2021-06-05 RX ADMIN — LABETALOL HYDROCHLORIDE 10 MG: 5 INJECTION, SOLUTION INTRAVENOUS at 04:06

## 2021-06-06 ENCOUNTER — ANESTHESIA (OUTPATIENT)
Dept: SURGERY | Facility: HOSPITAL | Age: 71
DRG: 026 | End: 2021-06-06
Payer: MEDICARE

## 2021-06-06 LAB
ALBUMIN SERPL BCP-MCNC: 3 G/DL (ref 3.5–5.2)
ALP SERPL-CCNC: 51 U/L (ref 55–135)
ALT SERPL W/O P-5'-P-CCNC: 12 U/L (ref 10–44)
ANION GAP SERPL CALC-SCNC: 10 MMOL/L (ref 8–16)
AST SERPL-CCNC: 17 U/L (ref 10–40)
BASOPHILS # BLD AUTO: 0.02 K/UL (ref 0–0.2)
BASOPHILS NFR BLD: 0.3 % (ref 0–1.9)
BILIRUB SERPL-MCNC: 0.3 MG/DL (ref 0.1–1)
BUN SERPL-MCNC: 12 MG/DL (ref 8–23)
CALCIUM SERPL-MCNC: 9.1 MG/DL (ref 8.7–10.5)
CHLORIDE SERPL-SCNC: 107 MMOL/L (ref 95–110)
CO2 SERPL-SCNC: 24 MMOL/L (ref 23–29)
CREAT SERPL-MCNC: 0.7 MG/DL (ref 0.5–1.4)
DIFFERENTIAL METHOD: ABNORMAL
EOSINOPHIL # BLD AUTO: 0 K/UL (ref 0–0.5)
EOSINOPHIL NFR BLD: 0.1 % (ref 0–8)
ERYTHROCYTE [DISTWIDTH] IN BLOOD BY AUTOMATED COUNT: 12.9 % (ref 11.5–14.5)
EST. GFR  (AFRICAN AMERICAN): >60 ML/MIN/1.73 M^2
EST. GFR  (NON AFRICAN AMERICAN): >60 ML/MIN/1.73 M^2
GLUCOSE SERPL-MCNC: 131 MG/DL (ref 70–110)
HCT VFR BLD AUTO: 29.3 % (ref 37–48.5)
HGB BLD-MCNC: 9.9 G/DL (ref 12–16)
IMM GRANULOCYTES # BLD AUTO: 0.03 K/UL (ref 0–0.04)
IMM GRANULOCYTES NFR BLD AUTO: 0.4 % (ref 0–0.5)
LYMPHOCYTES # BLD AUTO: 1.2 K/UL (ref 1–4.8)
LYMPHOCYTES NFR BLD: 16.6 % (ref 18–48)
MAGNESIUM SERPL-MCNC: 2 MG/DL (ref 1.6–2.6)
MCH RBC QN AUTO: 33.1 PG (ref 27–31)
MCHC RBC AUTO-ENTMCNC: 33.8 G/DL (ref 32–36)
MCV RBC AUTO: 98 FL (ref 82–98)
MONOCYTES # BLD AUTO: 0.7 K/UL (ref 0.3–1)
MONOCYTES NFR BLD: 9.4 % (ref 4–15)
NEUTROPHILS # BLD AUTO: 5.2 K/UL (ref 1.8–7.7)
NEUTROPHILS NFR BLD: 73.2 % (ref 38–73)
NRBC BLD-RTO: 0 /100 WBC
PHOSPHATE SERPL-MCNC: 3.1 MG/DL (ref 2.7–4.5)
PLATELET # BLD AUTO: 171 K/UL (ref 150–450)
PMV BLD AUTO: 10 FL (ref 9.2–12.9)
POTASSIUM SERPL-SCNC: 4 MMOL/L (ref 3.5–5.1)
PROT SERPL-MCNC: 5.9 G/DL (ref 6–8.4)
RBC # BLD AUTO: 2.99 M/UL (ref 4–5.4)
SODIUM SERPL-SCNC: 141 MMOL/L (ref 136–145)
WBC # BLD AUTO: 7.11 K/UL (ref 3.9–12.7)

## 2021-06-06 PROCEDURE — 27000221 HC OXYGEN, UP TO 24 HOURS

## 2021-06-06 PROCEDURE — 99233 PR SUBSEQUENT HOSPITAL CARE,LEVL III: ICD-10-PCS | Mod: GC,,, | Performed by: PSYCHIATRY & NEUROLOGY

## 2021-06-06 PROCEDURE — 25000003 PHARM REV CODE 250: Performed by: NURSE PRACTITIONER

## 2021-06-06 PROCEDURE — 94761 N-INVAS EAR/PLS OXIMETRY MLT: CPT

## 2021-06-06 PROCEDURE — 99900035 HC TECH TIME PER 15 MIN (STAT)

## 2021-06-06 PROCEDURE — 63600175 PHARM REV CODE 636 W HCPCS: Performed by: PHYSICIAN ASSISTANT

## 2021-06-06 PROCEDURE — 25000003 PHARM REV CODE 250: Performed by: STUDENT IN AN ORGANIZED HEALTH CARE EDUCATION/TRAINING PROGRAM

## 2021-06-06 PROCEDURE — 25500020 PHARM REV CODE 255: Performed by: PSYCHIATRY & NEUROLOGY

## 2021-06-06 PROCEDURE — 80053 COMPREHEN METABOLIC PANEL: CPT | Performed by: NURSE PRACTITIONER

## 2021-06-06 PROCEDURE — 25000003 PHARM REV CODE 250: Performed by: NURSE ANESTHETIST, CERTIFIED REGISTERED

## 2021-06-06 PROCEDURE — D9220A PRA ANESTHESIA: Mod: ANES,,, | Performed by: ANESTHESIOLOGY

## 2021-06-06 PROCEDURE — 63600175 PHARM REV CODE 636 W HCPCS: Performed by: STUDENT IN AN ORGANIZED HEALTH CARE EDUCATION/TRAINING PROGRAM

## 2021-06-06 PROCEDURE — 63600175 PHARM REV CODE 636 W HCPCS: Performed by: NURSE ANESTHETIST, CERTIFIED REGISTERED

## 2021-06-06 PROCEDURE — 37000008 HC ANESTHESIA 1ST 15 MINUTES: Performed by: ANESTHESIOLOGY

## 2021-06-06 PROCEDURE — 84100 ASSAY OF PHOSPHORUS: CPT | Performed by: PSYCHIATRY & NEUROLOGY

## 2021-06-06 PROCEDURE — D9220A PRA ANESTHESIA: ICD-10-PCS | Mod: CRNA,,, | Performed by: NURSE ANESTHETIST, CERTIFIED REGISTERED

## 2021-06-06 PROCEDURE — D9220A PRA ANESTHESIA: ICD-10-PCS | Mod: ANES,,, | Performed by: ANESTHESIOLOGY

## 2021-06-06 PROCEDURE — D9220A PRA ANESTHESIA: Mod: CRNA,,, | Performed by: NURSE ANESTHETIST, CERTIFIED REGISTERED

## 2021-06-06 PROCEDURE — 99233 SBSQ HOSP IP/OBS HIGH 50: CPT | Mod: GC,,, | Performed by: PSYCHIATRY & NEUROLOGY

## 2021-06-06 PROCEDURE — 83735 ASSAY OF MAGNESIUM: CPT | Performed by: PSYCHIATRY & NEUROLOGY

## 2021-06-06 PROCEDURE — 85025 COMPLETE CBC W/AUTO DIFF WBC: CPT | Performed by: NURSE PRACTITIONER

## 2021-06-06 PROCEDURE — 37000009 HC ANESTHESIA EA ADD 15 MINS: Performed by: ANESTHESIOLOGY

## 2021-06-06 PROCEDURE — 20000000 HC ICU ROOM

## 2021-06-06 RX ORDER — IODIXANOL 320 MG/ML
300 INJECTION, SOLUTION INTRAVASCULAR
Status: COMPLETED | OUTPATIENT
Start: 2021-06-06 | End: 2021-06-06

## 2021-06-06 RX ORDER — ONDANSETRON 2 MG/ML
INJECTION INTRAMUSCULAR; INTRAVENOUS
Status: DISCONTINUED | OUTPATIENT
Start: 2021-06-06 | End: 2021-06-06

## 2021-06-06 RX ORDER — MIDAZOLAM HYDROCHLORIDE 1 MG/ML
INJECTION INTRAMUSCULAR; INTRAVENOUS
Status: DISCONTINUED | OUTPATIENT
Start: 2021-06-06 | End: 2021-06-06

## 2021-06-06 RX ORDER — HEPARIN SODIUM 1000 [USP'U]/ML
INJECTION, SOLUTION INTRAVENOUS; SUBCUTANEOUS
Status: DISCONTINUED | OUTPATIENT
Start: 2021-06-06 | End: 2021-06-06

## 2021-06-06 RX ORDER — LIDOCAINE HYDROCHLORIDE 20 MG/ML
INJECTION INTRAVENOUS
Status: DISCONTINUED | OUTPATIENT
Start: 2021-06-06 | End: 2021-06-06

## 2021-06-06 RX ORDER — PHENYLEPHRINE HYDROCHLORIDE 10 MG/ML
INJECTION INTRAVENOUS
Status: DISCONTINUED | OUTPATIENT
Start: 2021-06-06 | End: 2021-06-06

## 2021-06-06 RX ORDER — FENTANYL CITRATE 50 UG/ML
INJECTION, SOLUTION INTRAMUSCULAR; INTRAVENOUS
Status: DISCONTINUED | OUTPATIENT
Start: 2021-06-06 | End: 2021-06-06

## 2021-06-06 RX ORDER — PROPOFOL 10 MG/ML
VIAL (ML) INTRAVENOUS CONTINUOUS PRN
Status: DISCONTINUED | OUTPATIENT
Start: 2021-06-06 | End: 2021-06-06

## 2021-06-06 RX ADMIN — PHENYLEPHRINE HYDROCHLORIDE 50 MCG: 10 INJECTION INTRAVENOUS at 02:06

## 2021-06-06 RX ADMIN — LIDOCAINE HYDROCHLORIDE 40 MG: 20 INJECTION, SOLUTION INTRAVENOUS at 02:06

## 2021-06-06 RX ADMIN — CEFAZOLIN 2 G: 330 INJECTION, POWDER, FOR SOLUTION INTRAMUSCULAR; INTRAVENOUS at 09:06

## 2021-06-06 RX ADMIN — SODIUM CHLORIDE 0.2 MCG/KG/MIN: 9 INJECTION, SOLUTION INTRAVENOUS at 02:06

## 2021-06-06 RX ADMIN — MIDAZOLAM HYDROCHLORIDE 2 MG: 1 INJECTION, SOLUTION INTRAMUSCULAR; INTRAVENOUS at 02:06

## 2021-06-06 RX ADMIN — FENTANYL CITRATE 50 MCG: 50 INJECTION, SOLUTION INTRAMUSCULAR; INTRAVENOUS at 03:06

## 2021-06-06 RX ADMIN — MUPIROCIN: 20 OINTMENT TOPICAL at 08:06

## 2021-06-06 RX ADMIN — IODIXANOL 225 ML: 320 INJECTION, SOLUTION INTRAVASCULAR at 05:06

## 2021-06-06 RX ADMIN — ACETAMINOPHEN 650 MG: 325 TABLET ORAL at 12:06

## 2021-06-06 RX ADMIN — MUPIROCIN: 20 OINTMENT TOPICAL at 09:06

## 2021-06-06 RX ADMIN — PHENYLEPHRINE HYDROCHLORIDE 50 MCG: 10 INJECTION INTRAVENOUS at 03:06

## 2021-06-06 RX ADMIN — CEFAZOLIN 2 G: 330 INJECTION, POWDER, FOR SOLUTION INTRAMUSCULAR; INTRAVENOUS at 05:06

## 2021-06-06 RX ADMIN — ACETAMINOPHEN 650 MG: 325 TABLET ORAL at 06:06

## 2021-06-06 RX ADMIN — HYDROCODONE BITARTRATE AND ACETAMINOPHEN 1 TABLET: 5; 325 TABLET ORAL at 07:06

## 2021-06-06 RX ADMIN — CEFAZOLIN 2 G: 330 INJECTION, POWDER, FOR SOLUTION INTRAMUSCULAR; INTRAVENOUS at 12:06

## 2021-06-06 RX ADMIN — FENTANYL CITRATE 50 MCG: 50 INJECTION, SOLUTION INTRAMUSCULAR; INTRAVENOUS at 02:06

## 2021-06-06 RX ADMIN — PROPOFOL 50 MCG/KG/MIN: 10 INJECTION, EMULSION INTRAVENOUS at 02:06

## 2021-06-06 RX ADMIN — LEVETIRACETAM 500 MG: 500 TABLET ORAL at 08:06

## 2021-06-06 RX ADMIN — HYDRALAZINE HYDROCHLORIDE 10 MG: 20 INJECTION INTRAMUSCULAR; INTRAVENOUS at 09:06

## 2021-06-06 RX ADMIN — HEPARIN SODIUM 1500 UNITS: 1000 INJECTION, SOLUTION INTRAVENOUS; SUBCUTANEOUS at 02:06

## 2021-06-06 RX ADMIN — ONDANSETRON 4 MG: 2 INJECTION INTRAMUSCULAR; INTRAVENOUS at 02:06

## 2021-06-07 LAB
ALBUMIN SERPL BCP-MCNC: 3 G/DL (ref 3.5–5.2)
ALP SERPL-CCNC: 54 U/L (ref 55–135)
ALT SERPL W/O P-5'-P-CCNC: 8 U/L (ref 10–44)
ANION GAP SERPL CALC-SCNC: 14 MMOL/L (ref 8–16)
AST SERPL-CCNC: 11 U/L (ref 10–40)
BASOPHILS # BLD AUTO: 0.03 K/UL (ref 0–0.2)
BASOPHILS NFR BLD: 0.5 % (ref 0–1.9)
BILIRUB SERPL-MCNC: 0.3 MG/DL (ref 0.1–1)
BUN SERPL-MCNC: 9 MG/DL (ref 8–23)
CALCIUM SERPL-MCNC: 9.2 MG/DL (ref 8.7–10.5)
CHLORIDE SERPL-SCNC: 105 MMOL/L (ref 95–110)
CO2 SERPL-SCNC: 22 MMOL/L (ref 23–29)
CREAT SERPL-MCNC: 0.7 MG/DL (ref 0.5–1.4)
DIFFERENTIAL METHOD: ABNORMAL
EOSINOPHIL # BLD AUTO: 0.1 K/UL (ref 0–0.5)
EOSINOPHIL NFR BLD: 1.3 % (ref 0–8)
ERYTHROCYTE [DISTWIDTH] IN BLOOD BY AUTOMATED COUNT: 12.8 % (ref 11.5–14.5)
EST. GFR  (AFRICAN AMERICAN): >60 ML/MIN/1.73 M^2
EST. GFR  (NON AFRICAN AMERICAN): >60 ML/MIN/1.73 M^2
GLUCOSE SERPL-MCNC: 84 MG/DL (ref 70–110)
HCT VFR BLD AUTO: 31.1 % (ref 37–48.5)
HGB BLD-MCNC: 10.4 G/DL (ref 12–16)
IMM GRANULOCYTES # BLD AUTO: 0.02 K/UL (ref 0–0.04)
IMM GRANULOCYTES NFR BLD AUTO: 0.4 % (ref 0–0.5)
LYMPHOCYTES # BLD AUTO: 1.3 K/UL (ref 1–4.8)
LYMPHOCYTES NFR BLD: 23.9 % (ref 18–48)
MAGNESIUM SERPL-MCNC: 2 MG/DL (ref 1.6–2.6)
MCH RBC QN AUTO: 32.8 PG (ref 27–31)
MCHC RBC AUTO-ENTMCNC: 33.4 G/DL (ref 32–36)
MCV RBC AUTO: 98 FL (ref 82–98)
MONOCYTES # BLD AUTO: 0.5 K/UL (ref 0.3–1)
MONOCYTES NFR BLD: 8.3 % (ref 4–15)
NEUTROPHILS # BLD AUTO: 3.7 K/UL (ref 1.8–7.7)
NEUTROPHILS NFR BLD: 65.6 % (ref 38–73)
NRBC BLD-RTO: 0 /100 WBC
PHOSPHATE SERPL-MCNC: 3.4 MG/DL (ref 2.7–4.5)
PLATELET # BLD AUTO: 166 K/UL (ref 150–450)
PMV BLD AUTO: 9.8 FL (ref 9.2–12.9)
POTASSIUM SERPL-SCNC: 4 MMOL/L (ref 3.5–5.1)
PROT SERPL-MCNC: 5.9 G/DL (ref 6–8.4)
RBC # BLD AUTO: 3.17 M/UL (ref 4–5.4)
SODIUM SERPL-SCNC: 141 MMOL/L (ref 136–145)
WBC # BLD AUTO: 5.57 K/UL (ref 3.9–12.7)

## 2021-06-07 PROCEDURE — 85025 COMPLETE CBC W/AUTO DIFF WBC: CPT | Performed by: NURSE PRACTITIONER

## 2021-06-07 PROCEDURE — 99233 SBSQ HOSP IP/OBS HIGH 50: CPT | Mod: ,,, | Performed by: PSYCHIATRY & NEUROLOGY

## 2021-06-07 PROCEDURE — 25000003 PHARM REV CODE 250: Performed by: PHYSICIAN ASSISTANT

## 2021-06-07 PROCEDURE — 99900035 HC TECH TIME PER 15 MIN (STAT)

## 2021-06-07 PROCEDURE — 97165 OT EVAL LOW COMPLEX 30 MIN: CPT

## 2021-06-07 PROCEDURE — 25000003 PHARM REV CODE 250: Performed by: NURSE PRACTITIONER

## 2021-06-07 PROCEDURE — 20000000 HC ICU ROOM

## 2021-06-07 PROCEDURE — 63600175 PHARM REV CODE 636 W HCPCS: Performed by: STUDENT IN AN ORGANIZED HEALTH CARE EDUCATION/TRAINING PROGRAM

## 2021-06-07 PROCEDURE — 99233 PR SUBSEQUENT HOSPITAL CARE,LEVL III: ICD-10-PCS | Mod: ,,, | Performed by: PSYCHIATRY & NEUROLOGY

## 2021-06-07 PROCEDURE — 25000003 PHARM REV CODE 250: Performed by: STUDENT IN AN ORGANIZED HEALTH CARE EDUCATION/TRAINING PROGRAM

## 2021-06-07 PROCEDURE — 84100 ASSAY OF PHOSPHORUS: CPT | Performed by: NURSE PRACTITIONER

## 2021-06-07 PROCEDURE — 94761 N-INVAS EAR/PLS OXIMETRY MLT: CPT

## 2021-06-07 PROCEDURE — 27000221 HC OXYGEN, UP TO 24 HOURS

## 2021-06-07 PROCEDURE — 83735 ASSAY OF MAGNESIUM: CPT | Performed by: NURSE PRACTITIONER

## 2021-06-07 PROCEDURE — 97530 THERAPEUTIC ACTIVITIES: CPT

## 2021-06-07 PROCEDURE — 80053 COMPREHEN METABOLIC PANEL: CPT | Performed by: NURSE PRACTITIONER

## 2021-06-07 RX ORDER — OXYCODONE HYDROCHLORIDE 10 MG/1
10 TABLET ORAL EVERY 6 HOURS PRN
Status: DISCONTINUED | OUTPATIENT
Start: 2021-06-07 | End: 2021-06-11

## 2021-06-07 RX ORDER — OXYCODONE HYDROCHLORIDE 5 MG/1
5 TABLET ORAL EVERY 4 HOURS PRN
Status: DISCONTINUED | OUTPATIENT
Start: 2021-06-07 | End: 2021-06-07

## 2021-06-07 RX ORDER — HEPARIN SODIUM 5000 [USP'U]/ML
5000 INJECTION, SOLUTION INTRAVENOUS; SUBCUTANEOUS EVERY 8 HOURS
Status: DISCONTINUED | OUTPATIENT
Start: 2021-06-08 | End: 2021-06-12 | Stop reason: HOSPADM

## 2021-06-07 RX ADMIN — CEFAZOLIN 2 G: 330 INJECTION, POWDER, FOR SOLUTION INTRAMUSCULAR; INTRAVENOUS at 09:06

## 2021-06-07 RX ADMIN — ROSUVASTATIN CALCIUM 20 MG: 20 TABLET, FILM COATED ORAL at 09:06

## 2021-06-07 RX ADMIN — CEFAZOLIN 2 G: 330 INJECTION, POWDER, FOR SOLUTION INTRAMUSCULAR; INTRAVENOUS at 12:06

## 2021-06-07 RX ADMIN — LEVETIRACETAM 500 MG: 500 TABLET ORAL at 09:06

## 2021-06-07 RX ADMIN — HYDROCODONE BITARTRATE AND ACETAMINOPHEN 1 TABLET: 5; 325 TABLET ORAL at 09:06

## 2021-06-07 RX ADMIN — ACETAMINOPHEN 650 MG: 325 TABLET ORAL at 09:06

## 2021-06-07 RX ADMIN — MUPIROCIN: 20 OINTMENT TOPICAL at 09:06

## 2021-06-07 RX ADMIN — ACETAMINOPHEN 650 MG: 325 TABLET ORAL at 06:06

## 2021-06-07 RX ADMIN — LISINOPRIL 40 MG: 20 TABLET ORAL at 09:06

## 2021-06-07 RX ADMIN — ACETAMINOPHEN 650 MG: 325 TABLET ORAL at 04:06

## 2021-06-07 RX ADMIN — HYDROCODONE BITARTRATE AND ACETAMINOPHEN 1 TABLET: 5; 325 TABLET ORAL at 01:06

## 2021-06-07 RX ADMIN — CEFAZOLIN 2 G: 330 INJECTION, POWDER, FOR SOLUTION INTRAMUSCULAR; INTRAVENOUS at 06:06

## 2021-06-08 LAB
ALBUMIN SERPL BCP-MCNC: 3.3 G/DL (ref 3.5–5.2)
ALP SERPL-CCNC: 60 U/L (ref 55–135)
ALT SERPL W/O P-5'-P-CCNC: 8 U/L (ref 10–44)
ANION GAP SERPL CALC-SCNC: 10 MMOL/L (ref 8–16)
AST SERPL-CCNC: 18 U/L (ref 10–40)
BASOPHILS # BLD AUTO: 0.03 K/UL (ref 0–0.2)
BASOPHILS NFR BLD: 0.5 % (ref 0–1.9)
BILIRUB SERPL-MCNC: 0.6 MG/DL (ref 0.1–1)
BUN SERPL-MCNC: 11 MG/DL (ref 8–23)
CALCIUM SERPL-MCNC: 9.9 MG/DL (ref 8.7–10.5)
CHLORIDE SERPL-SCNC: 101 MMOL/L (ref 95–110)
CO2 SERPL-SCNC: 25 MMOL/L (ref 23–29)
CREAT SERPL-MCNC: 0.6 MG/DL (ref 0.5–1.4)
DIFFERENTIAL METHOD: ABNORMAL
EOSINOPHIL # BLD AUTO: 0.1 K/UL (ref 0–0.5)
EOSINOPHIL NFR BLD: 1.4 % (ref 0–8)
ERYTHROCYTE [DISTWIDTH] IN BLOOD BY AUTOMATED COUNT: 12.5 % (ref 11.5–14.5)
EST. GFR  (AFRICAN AMERICAN): >60 ML/MIN/1.73 M^2
EST. GFR  (NON AFRICAN AMERICAN): >60 ML/MIN/1.73 M^2
GLUCOSE SERPL-MCNC: 97 MG/DL (ref 70–110)
HCT VFR BLD AUTO: 33 % (ref 37–48.5)
HGB BLD-MCNC: 11 G/DL (ref 12–16)
IMM GRANULOCYTES # BLD AUTO: 0.03 K/UL (ref 0–0.04)
IMM GRANULOCYTES NFR BLD AUTO: 0.5 % (ref 0–0.5)
LYMPHOCYTES # BLD AUTO: 1.2 K/UL (ref 1–4.8)
LYMPHOCYTES NFR BLD: 18 % (ref 18–48)
MAGNESIUM SERPL-MCNC: 1.9 MG/DL (ref 1.6–2.6)
MCH RBC QN AUTO: 32.6 PG (ref 27–31)
MCHC RBC AUTO-ENTMCNC: 33.3 G/DL (ref 32–36)
MCV RBC AUTO: 98 FL (ref 82–98)
MONOCYTES # BLD AUTO: 0.7 K/UL (ref 0.3–1)
MONOCYTES NFR BLD: 11 % (ref 4–15)
NEUTROPHILS # BLD AUTO: 4.6 K/UL (ref 1.8–7.7)
NEUTROPHILS NFR BLD: 68.6 % (ref 38–73)
NRBC BLD-RTO: 0 /100 WBC
PLATELET # BLD AUTO: 178 K/UL (ref 150–450)
PMV BLD AUTO: 10 FL (ref 9.2–12.9)
POTASSIUM SERPL-SCNC: 3.8 MMOL/L (ref 3.5–5.1)
PROT SERPL-MCNC: 6.6 G/DL (ref 6–8.4)
RBC # BLD AUTO: 3.37 M/UL (ref 4–5.4)
SODIUM SERPL-SCNC: 136 MMOL/L (ref 136–145)
WBC # BLD AUTO: 6.62 K/UL (ref 3.9–12.7)

## 2021-06-08 PROCEDURE — 97530 THERAPEUTIC ACTIVITIES: CPT

## 2021-06-08 PROCEDURE — 83735 ASSAY OF MAGNESIUM: CPT | Performed by: NURSE PRACTITIONER

## 2021-06-08 PROCEDURE — 99233 PR SUBSEQUENT HOSPITAL CARE,LEVL III: ICD-10-PCS | Mod: ,,, | Performed by: PHYSICIAN ASSISTANT

## 2021-06-08 PROCEDURE — 97116 GAIT TRAINING THERAPY: CPT

## 2021-06-08 PROCEDURE — 92523 SPEECH SOUND LANG COMPREHEN: CPT

## 2021-06-08 PROCEDURE — 25000003 PHARM REV CODE 250: Performed by: STUDENT IN AN ORGANIZED HEALTH CARE EDUCATION/TRAINING PROGRAM

## 2021-06-08 PROCEDURE — 27000221 HC OXYGEN, UP TO 24 HOURS

## 2021-06-08 PROCEDURE — 99900035 HC TECH TIME PER 15 MIN (STAT)

## 2021-06-08 PROCEDURE — 25000003 PHARM REV CODE 250: Performed by: PHYSICIAN ASSISTANT

## 2021-06-08 PROCEDURE — 97535 SELF CARE MNGMENT TRAINING: CPT

## 2021-06-08 PROCEDURE — 80053 COMPREHEN METABOLIC PANEL: CPT | Performed by: NURSE PRACTITIONER

## 2021-06-08 PROCEDURE — 94761 N-INVAS EAR/PLS OXIMETRY MLT: CPT

## 2021-06-08 PROCEDURE — 25000003 PHARM REV CODE 250: Performed by: NURSE PRACTITIONER

## 2021-06-08 PROCEDURE — 63600175 PHARM REV CODE 636 W HCPCS: Performed by: STUDENT IN AN ORGANIZED HEALTH CARE EDUCATION/TRAINING PROGRAM

## 2021-06-08 PROCEDURE — 85025 COMPLETE CBC W/AUTO DIFF WBC: CPT | Performed by: NURSE PRACTITIONER

## 2021-06-08 PROCEDURE — 97161 PT EVAL LOW COMPLEX 20 MIN: CPT

## 2021-06-08 PROCEDURE — 99233 SBSQ HOSP IP/OBS HIGH 50: CPT | Mod: ,,, | Performed by: PHYSICIAN ASSISTANT

## 2021-06-08 PROCEDURE — 20000000 HC ICU ROOM

## 2021-06-08 RX ORDER — AMOXICILLIN 250 MG
2 CAPSULE ORAL DAILY
Status: DISCONTINUED | OUTPATIENT
Start: 2021-06-08 | End: 2021-06-12 | Stop reason: HOSPADM

## 2021-06-08 RX ADMIN — LISINOPRIL 40 MG: 20 TABLET ORAL at 09:06

## 2021-06-08 RX ADMIN — HEPARIN SODIUM 5000 UNITS: 5000 INJECTION INTRAVENOUS; SUBCUTANEOUS at 02:06

## 2021-06-08 RX ADMIN — LEVETIRACETAM 500 MG: 500 TABLET ORAL at 09:06

## 2021-06-08 RX ADMIN — HYDROCODONE BITARTRATE AND ACETAMINOPHEN 1 TABLET: 5; 325 TABLET ORAL at 06:06

## 2021-06-08 RX ADMIN — HEPARIN SODIUM 5000 UNITS: 5000 INJECTION INTRAVENOUS; SUBCUTANEOUS at 09:06

## 2021-06-08 RX ADMIN — OXYCODONE HYDROCHLORIDE 10 MG: 10 TABLET ORAL at 01:06

## 2021-06-08 RX ADMIN — DOCUSATE SODIUM 50MG AND SENNOSIDES 8.6MG 2 TABLET: 8.6; 5 TABLET, FILM COATED ORAL at 09:06

## 2021-06-08 RX ADMIN — ROSUVASTATIN CALCIUM 20 MG: 20 TABLET, FILM COATED ORAL at 09:06

## 2021-06-08 RX ADMIN — OXYCODONE HYDROCHLORIDE 10 MG: 10 TABLET ORAL at 10:06

## 2021-06-08 RX ADMIN — HYDROCODONE BITARTRATE AND ACETAMINOPHEN 1 TABLET: 5; 325 TABLET ORAL at 09:06

## 2021-06-08 RX ADMIN — HYDROCODONE BITARTRATE AND ACETAMINOPHEN 1 TABLET: 5; 325 TABLET ORAL at 02:06

## 2021-06-08 RX ADMIN — MUPIROCIN: 20 OINTMENT TOPICAL at 09:06

## 2021-06-08 RX ADMIN — ACETAMINOPHEN 650 MG: 325 TABLET ORAL at 09:06

## 2021-06-09 LAB
ALBUMIN SERPL BCP-MCNC: 2.9 G/DL (ref 3.5–5.2)
ALP SERPL-CCNC: 62 U/L (ref 55–135)
ALT SERPL W/O P-5'-P-CCNC: 6 U/L (ref 10–44)
ANION GAP SERPL CALC-SCNC: 12 MMOL/L (ref 8–16)
AST SERPL-CCNC: 14 U/L (ref 10–40)
BASOPHILS # BLD AUTO: 0.03 K/UL (ref 0–0.2)
BASOPHILS # BLD AUTO: 0.03 K/UL (ref 0–0.2)
BASOPHILS NFR BLD: 0.5 % (ref 0–1.9)
BASOPHILS NFR BLD: 0.5 % (ref 0–1.9)
BILIRUB SERPL-MCNC: 0.3 MG/DL (ref 0.1–1)
BUN SERPL-MCNC: 14 MG/DL (ref 8–23)
CALCIUM SERPL-MCNC: 9.5 MG/DL (ref 8.7–10.5)
CHLORIDE SERPL-SCNC: 102 MMOL/L (ref 95–110)
CO2 SERPL-SCNC: 24 MMOL/L (ref 23–29)
CREAT SERPL-MCNC: 0.6 MG/DL (ref 0.5–1.4)
DIFFERENTIAL METHOD: ABNORMAL
DIFFERENTIAL METHOD: ABNORMAL
EOSINOPHIL # BLD AUTO: 0.2 K/UL (ref 0–0.5)
EOSINOPHIL # BLD AUTO: 0.2 K/UL (ref 0–0.5)
EOSINOPHIL NFR BLD: 3 % (ref 0–8)
EOSINOPHIL NFR BLD: 3 % (ref 0–8)
ERYTHROCYTE [DISTWIDTH] IN BLOOD BY AUTOMATED COUNT: 12.6 % (ref 11.5–14.5)
ERYTHROCYTE [DISTWIDTH] IN BLOOD BY AUTOMATED COUNT: 12.6 % (ref 11.5–14.5)
EST. GFR  (AFRICAN AMERICAN): >60 ML/MIN/1.73 M^2
EST. GFR  (NON AFRICAN AMERICAN): >60 ML/MIN/1.73 M^2
GLUCOSE SERPL-MCNC: 121 MG/DL (ref 70–110)
HCT VFR BLD AUTO: 29.4 % (ref 37–48.5)
HCT VFR BLD AUTO: 29.4 % (ref 37–48.5)
HGB BLD-MCNC: 10 G/DL (ref 12–16)
HGB BLD-MCNC: 10 G/DL (ref 12–16)
IMM GRANULOCYTES # BLD AUTO: 0.02 K/UL (ref 0–0.04)
IMM GRANULOCYTES # BLD AUTO: 0.02 K/UL (ref 0–0.04)
IMM GRANULOCYTES NFR BLD AUTO: 0.4 % (ref 0–0.5)
IMM GRANULOCYTES NFR BLD AUTO: 0.4 % (ref 0–0.5)
LYMPHOCYTES # BLD AUTO: 1.5 K/UL (ref 1–4.8)
LYMPHOCYTES # BLD AUTO: 1.5 K/UL (ref 1–4.8)
LYMPHOCYTES NFR BLD: 25.5 % (ref 18–48)
LYMPHOCYTES NFR BLD: 25.5 % (ref 18–48)
MAGNESIUM SERPL-MCNC: 1.9 MG/DL (ref 1.6–2.6)
MCH RBC QN AUTO: 32.5 PG (ref 27–31)
MCH RBC QN AUTO: 32.5 PG (ref 27–31)
MCHC RBC AUTO-ENTMCNC: 34 G/DL (ref 32–36)
MCHC RBC AUTO-ENTMCNC: 34 G/DL (ref 32–36)
MCV RBC AUTO: 96 FL (ref 82–98)
MCV RBC AUTO: 96 FL (ref 82–98)
MONOCYTES # BLD AUTO: 0.7 K/UL (ref 0.3–1)
MONOCYTES # BLD AUTO: 0.7 K/UL (ref 0.3–1)
MONOCYTES NFR BLD: 11.8 % (ref 4–15)
MONOCYTES NFR BLD: 11.8 % (ref 4–15)
NEUTROPHILS # BLD AUTO: 3.3 K/UL (ref 1.8–7.7)
NEUTROPHILS # BLD AUTO: 3.3 K/UL (ref 1.8–7.7)
NEUTROPHILS NFR BLD: 58.8 % (ref 38–73)
NEUTROPHILS NFR BLD: 58.8 % (ref 38–73)
NRBC BLD-RTO: 0 /100 WBC
NRBC BLD-RTO: 0 /100 WBC
PHOSPHATE SERPL-MCNC: 3.5 MG/DL (ref 2.7–4.5)
PLATELET # BLD AUTO: 188 K/UL (ref 150–450)
PLATELET # BLD AUTO: 188 K/UL (ref 150–450)
PMV BLD AUTO: 10.4 FL (ref 9.2–12.9)
PMV BLD AUTO: 10.4 FL (ref 9.2–12.9)
POTASSIUM SERPL-SCNC: 3.6 MMOL/L (ref 3.5–5.1)
PROT SERPL-MCNC: 6 G/DL (ref 6–8.4)
RBC # BLD AUTO: 3.08 M/UL (ref 4–5.4)
RBC # BLD AUTO: 3.08 M/UL (ref 4–5.4)
SODIUM SERPL-SCNC: 138 MMOL/L (ref 136–145)
WBC # BLD AUTO: 5.68 K/UL (ref 3.9–12.7)
WBC # BLD AUTO: 5.68 K/UL (ref 3.9–12.7)

## 2021-06-09 PROCEDURE — 94761 N-INVAS EAR/PLS OXIMETRY MLT: CPT

## 2021-06-09 PROCEDURE — 83735 ASSAY OF MAGNESIUM: CPT | Performed by: NURSE PRACTITIONER

## 2021-06-09 PROCEDURE — 25000003 PHARM REV CODE 250: Performed by: NURSE PRACTITIONER

## 2021-06-09 PROCEDURE — 85025 COMPLETE CBC W/AUTO DIFF WBC: CPT | Performed by: NURSE PRACTITIONER

## 2021-06-09 PROCEDURE — 11000001 HC ACUTE MED/SURG PRIVATE ROOM

## 2021-06-09 PROCEDURE — 25000003 PHARM REV CODE 250: Performed by: PHYSICIAN ASSISTANT

## 2021-06-09 PROCEDURE — 80053 COMPREHEN METABOLIC PANEL: CPT | Performed by: NURSE PRACTITIONER

## 2021-06-09 PROCEDURE — 99233 SBSQ HOSP IP/OBS HIGH 50: CPT | Mod: ,,, | Performed by: PHYSICIAN ASSISTANT

## 2021-06-09 PROCEDURE — 99233 PR SUBSEQUENT HOSPITAL CARE,LEVL III: ICD-10-PCS | Mod: ,,, | Performed by: PHYSICIAN ASSISTANT

## 2021-06-09 PROCEDURE — 25000003 PHARM REV CODE 250: Performed by: STUDENT IN AN ORGANIZED HEALTH CARE EDUCATION/TRAINING PROGRAM

## 2021-06-09 PROCEDURE — 27000221 HC OXYGEN, UP TO 24 HOURS

## 2021-06-09 PROCEDURE — 84100 ASSAY OF PHOSPHORUS: CPT | Performed by: PSYCHIATRY & NEUROLOGY

## 2021-06-09 PROCEDURE — 99900035 HC TECH TIME PER 15 MIN (STAT)

## 2021-06-09 PROCEDURE — 97535 SELF CARE MNGMENT TRAINING: CPT

## 2021-06-09 PROCEDURE — 97530 THERAPEUTIC ACTIVITIES: CPT

## 2021-06-09 PROCEDURE — 63600175 PHARM REV CODE 636 W HCPCS: Performed by: STUDENT IN AN ORGANIZED HEALTH CARE EDUCATION/TRAINING PROGRAM

## 2021-06-09 RX ORDER — LISINOPRIL 20 MG/1
20 TABLET ORAL NIGHTLY
Status: DISCONTINUED | OUTPATIENT
Start: 2021-06-10 | End: 2021-06-10

## 2021-06-09 RX ADMIN — LISINOPRIL 40 MG: 20 TABLET ORAL at 08:06

## 2021-06-09 RX ADMIN — DOCUSATE SODIUM 50MG AND SENNOSIDES 8.6MG 2 TABLET: 8.6; 5 TABLET, FILM COATED ORAL at 08:06

## 2021-06-09 RX ADMIN — HYDROCODONE BITARTRATE AND ACETAMINOPHEN 1 TABLET: 5; 325 TABLET ORAL at 08:06

## 2021-06-09 RX ADMIN — HYDROCODONE BITARTRATE AND ACETAMINOPHEN 1 TABLET: 5; 325 TABLET ORAL at 01:06

## 2021-06-09 RX ADMIN — ROSUVASTATIN CALCIUM 20 MG: 20 TABLET, FILM COATED ORAL at 08:06

## 2021-06-09 RX ADMIN — HEPARIN SODIUM 5000 UNITS: 5000 INJECTION INTRAVENOUS; SUBCUTANEOUS at 03:06

## 2021-06-09 RX ADMIN — LEVETIRACETAM 500 MG: 500 TABLET ORAL at 10:06

## 2021-06-09 RX ADMIN — LEVETIRACETAM 500 MG: 500 TABLET ORAL at 08:06

## 2021-06-09 RX ADMIN — HEPARIN SODIUM 5000 UNITS: 5000 INJECTION INTRAVENOUS; SUBCUTANEOUS at 10:06

## 2021-06-09 RX ADMIN — OXYCODONE HYDROCHLORIDE 10 MG: 10 TABLET ORAL at 04:06

## 2021-06-09 RX ADMIN — HEPARIN SODIUM 5000 UNITS: 5000 INJECTION INTRAVENOUS; SUBCUTANEOUS at 06:06

## 2021-06-09 RX ADMIN — HYDROCODONE BITARTRATE AND ACETAMINOPHEN 1 TABLET: 5; 325 TABLET ORAL at 03:06

## 2021-06-10 PROBLEM — Z78.9 IMPAIRED MOBILITY AND ADLS: Status: ACTIVE | Noted: 2021-06-10

## 2021-06-10 PROBLEM — Z74.09 IMPAIRED MOBILITY AND ADLS: Status: ACTIVE | Noted: 2021-06-10

## 2021-06-10 LAB
ALBUMIN SERPL BCP-MCNC: 3 G/DL (ref 3.5–5.2)
ALP SERPL-CCNC: 65 U/L (ref 55–135)
ALT SERPL W/O P-5'-P-CCNC: 8 U/L (ref 10–44)
ANION GAP SERPL CALC-SCNC: 13 MMOL/L (ref 8–16)
AST SERPL-CCNC: 19 U/L (ref 10–40)
BASOPHILS # BLD AUTO: 0.04 K/UL (ref 0–0.2)
BASOPHILS NFR BLD: 0.8 % (ref 0–1.9)
BILIRUB SERPL-MCNC: 0.4 MG/DL (ref 0.1–1)
BUN SERPL-MCNC: 11 MG/DL (ref 8–23)
CALCIUM SERPL-MCNC: 9.9 MG/DL (ref 8.7–10.5)
CHLORIDE SERPL-SCNC: 102 MMOL/L (ref 95–110)
CO2 SERPL-SCNC: 25 MMOL/L (ref 23–29)
CREAT SERPL-MCNC: 0.6 MG/DL (ref 0.5–1.4)
DIFFERENTIAL METHOD: ABNORMAL
EOSINOPHIL # BLD AUTO: 0.2 K/UL (ref 0–0.5)
EOSINOPHIL NFR BLD: 3 % (ref 0–8)
ERYTHROCYTE [DISTWIDTH] IN BLOOD BY AUTOMATED COUNT: 12.4 % (ref 11.5–14.5)
EST. GFR  (AFRICAN AMERICAN): >60 ML/MIN/1.73 M^2
EST. GFR  (NON AFRICAN AMERICAN): >60 ML/MIN/1.73 M^2
GLUCOSE SERPL-MCNC: 97 MG/DL (ref 70–110)
HCT VFR BLD AUTO: 29.8 % (ref 37–48.5)
HGB BLD-MCNC: 10 G/DL (ref 12–16)
IMM GRANULOCYTES # BLD AUTO: 0.02 K/UL (ref 0–0.04)
IMM GRANULOCYTES NFR BLD AUTO: 0.4 % (ref 0–0.5)
LYMPHOCYTES # BLD AUTO: 1.3 K/UL (ref 1–4.8)
LYMPHOCYTES NFR BLD: 25.1 % (ref 18–48)
MCH RBC QN AUTO: 32.8 PG (ref 27–31)
MCHC RBC AUTO-ENTMCNC: 33.6 G/DL (ref 32–36)
MCV RBC AUTO: 98 FL (ref 82–98)
MONOCYTES # BLD AUTO: 0.5 K/UL (ref 0.3–1)
MONOCYTES NFR BLD: 10.2 % (ref 4–15)
NEUTROPHILS # BLD AUTO: 3 K/UL (ref 1.8–7.7)
NEUTROPHILS NFR BLD: 60.5 % (ref 38–73)
NRBC BLD-RTO: 0 /100 WBC
PHOSPHATE SERPL-MCNC: 3.5 MG/DL (ref 2.7–4.5)
PLATELET # BLD AUTO: 206 K/UL (ref 150–450)
PMV BLD AUTO: 10.6 FL (ref 9.2–12.9)
POTASSIUM SERPL-SCNC: 4 MMOL/L (ref 3.5–5.1)
PROT SERPL-MCNC: 6.4 G/DL (ref 6–8.4)
RBC # BLD AUTO: 3.05 M/UL (ref 4–5.4)
SODIUM SERPL-SCNC: 140 MMOL/L (ref 136–145)
WBC # BLD AUTO: 4.98 K/UL (ref 3.9–12.7)

## 2021-06-10 PROCEDURE — 85025 COMPLETE CBC W/AUTO DIFF WBC: CPT | Performed by: NURSE PRACTITIONER

## 2021-06-10 PROCEDURE — 99222 1ST HOSP IP/OBS MODERATE 55: CPT | Mod: ,,, | Performed by: NURSE PRACTITIONER

## 2021-06-10 PROCEDURE — 25000242 PHARM REV CODE 250 ALT 637 W/ HCPCS: Performed by: PHYSICIAN ASSISTANT

## 2021-06-10 PROCEDURE — 80053 COMPREHEN METABOLIC PANEL: CPT | Performed by: NURSE PRACTITIONER

## 2021-06-10 PROCEDURE — 99900035 HC TECH TIME PER 15 MIN (STAT)

## 2021-06-10 PROCEDURE — 25000003 PHARM REV CODE 250: Performed by: NURSE PRACTITIONER

## 2021-06-10 PROCEDURE — 25000003 PHARM REV CODE 250: Performed by: PHYSICIAN ASSISTANT

## 2021-06-10 PROCEDURE — 94761 N-INVAS EAR/PLS OXIMETRY MLT: CPT

## 2021-06-10 PROCEDURE — 99024 PR POST-OP FOLLOW-UP VISIT: ICD-10-PCS | Mod: ,,, | Performed by: PHYSICIAN ASSISTANT

## 2021-06-10 PROCEDURE — 84100 ASSAY OF PHOSPHORUS: CPT | Performed by: PSYCHIATRY & NEUROLOGY

## 2021-06-10 PROCEDURE — 97116 GAIT TRAINING THERAPY: CPT | Mod: CQ

## 2021-06-10 PROCEDURE — 99223 1ST HOSP IP/OBS HIGH 75: CPT | Mod: GC,,, | Performed by: HOSPITALIST

## 2021-06-10 PROCEDURE — 27000221 HC OXYGEN, UP TO 24 HOURS

## 2021-06-10 PROCEDURE — 63600175 PHARM REV CODE 636 W HCPCS: Performed by: STUDENT IN AN ORGANIZED HEALTH CARE EDUCATION/TRAINING PROGRAM

## 2021-06-10 PROCEDURE — 99223 PR INITIAL HOSPITAL CARE,LEVL III: ICD-10-PCS | Mod: GC,,, | Performed by: HOSPITALIST

## 2021-06-10 PROCEDURE — 94640 AIRWAY INHALATION TREATMENT: CPT

## 2021-06-10 PROCEDURE — 99024 POSTOP FOLLOW-UP VISIT: CPT | Mod: ,,, | Performed by: PHYSICIAN ASSISTANT

## 2021-06-10 PROCEDURE — 97530 THERAPEUTIC ACTIVITIES: CPT | Mod: CQ

## 2021-06-10 PROCEDURE — 25000003 PHARM REV CODE 250: Performed by: STUDENT IN AN ORGANIZED HEALTH CARE EDUCATION/TRAINING PROGRAM

## 2021-06-10 PROCEDURE — 36415 COLL VENOUS BLD VENIPUNCTURE: CPT | Performed by: PSYCHIATRY & NEUROLOGY

## 2021-06-10 PROCEDURE — 11000001 HC ACUTE MED/SURG PRIVATE ROOM

## 2021-06-10 PROCEDURE — 99222 PR INITIAL HOSPITAL CARE,LEVL II: ICD-10-PCS | Mod: ,,, | Performed by: NURSE PRACTITIONER

## 2021-06-10 RX ORDER — LACTULOSE 10 G/15ML
30 SOLUTION ORAL ONCE
Status: COMPLETED | OUTPATIENT
Start: 2021-06-10 | End: 2021-06-10

## 2021-06-10 RX ORDER — POLYETHYLENE GLYCOL 3350 17 G/17G
17 POWDER, FOR SOLUTION ORAL DAILY
Status: DISCONTINUED | OUTPATIENT
Start: 2021-06-10 | End: 2021-06-12 | Stop reason: HOSPADM

## 2021-06-10 RX ORDER — IPRATROPIUM BROMIDE AND ALBUTEROL SULFATE 2.5; .5 MG/3ML; MG/3ML
3 SOLUTION RESPIRATORY (INHALATION) EVERY 6 HOURS
Status: DISCONTINUED | OUTPATIENT
Start: 2021-06-10 | End: 2021-06-11

## 2021-06-10 RX ADMIN — ROSUVASTATIN CALCIUM 20 MG: 20 TABLET, FILM COATED ORAL at 09:06

## 2021-06-10 RX ADMIN — LACTULOSE 30 G: 10 SOLUTION ORAL at 09:06

## 2021-06-10 RX ADMIN — IPRATROPIUM BROMIDE AND ALBUTEROL SULFATE 3 ML: .5; 2.5 SOLUTION RESPIRATORY (INHALATION) at 08:06

## 2021-06-10 RX ADMIN — HYDROCODONE BITARTRATE AND ACETAMINOPHEN 1 TABLET: 5; 325 TABLET ORAL at 09:06

## 2021-06-10 RX ADMIN — HEPARIN SODIUM 5000 UNITS: 5000 INJECTION INTRAVENOUS; SUBCUTANEOUS at 05:06

## 2021-06-10 RX ADMIN — LEVETIRACETAM 500 MG: 500 TABLET ORAL at 09:06

## 2021-06-10 RX ADMIN — POLYETHYLENE GLYCOL 3350 17 G: 17 POWDER, FOR SOLUTION ORAL at 09:06

## 2021-06-10 RX ADMIN — HYDROCODONE BITARTRATE AND ACETAMINOPHEN 1 TABLET: 5; 325 TABLET ORAL at 03:06

## 2021-06-10 RX ADMIN — DOCUSATE SODIUM 50MG AND SENNOSIDES 8.6MG 2 TABLET: 8.6; 5 TABLET, FILM COATED ORAL at 09:06

## 2021-06-10 RX ADMIN — HEPARIN SODIUM 5000 UNITS: 5000 INJECTION INTRAVENOUS; SUBCUTANEOUS at 03:06

## 2021-06-10 RX ADMIN — HYDROCODONE BITARTRATE AND ACETAMINOPHEN 1 TABLET: 5; 325 TABLET ORAL at 01:06

## 2021-06-10 RX ADMIN — SODIUM CHLORIDE 500 ML: 0.9 INJECTION, SOLUTION INTRAVENOUS at 03:06

## 2021-06-10 RX ADMIN — HEPARIN SODIUM 5000 UNITS: 5000 INJECTION INTRAVENOUS; SUBCUTANEOUS at 09:06

## 2021-06-11 LAB
ALBUMIN SERPL BCP-MCNC: 3.1 G/DL (ref 3.5–5.2)
ALP SERPL-CCNC: 72 U/L (ref 55–135)
ALT SERPL W/O P-5'-P-CCNC: 12 U/L (ref 10–44)
ANION GAP SERPL CALC-SCNC: 10 MMOL/L (ref 8–16)
AST SERPL-CCNC: 23 U/L (ref 10–40)
BASOPHILS # BLD AUTO: 0.03 K/UL (ref 0–0.2)
BASOPHILS NFR BLD: 0.7 % (ref 0–1.9)
BILIRUB SERPL-MCNC: 0.3 MG/DL (ref 0.1–1)
BUN SERPL-MCNC: 9 MG/DL (ref 8–23)
CALCIUM SERPL-MCNC: 9.8 MG/DL (ref 8.7–10.5)
CHLORIDE SERPL-SCNC: 104 MMOL/L (ref 95–110)
CO2 SERPL-SCNC: 24 MMOL/L (ref 23–29)
CREAT SERPL-MCNC: 0.6 MG/DL (ref 0.5–1.4)
DIFFERENTIAL METHOD: ABNORMAL
EOSINOPHIL # BLD AUTO: 0.1 K/UL (ref 0–0.5)
EOSINOPHIL NFR BLD: 2.9 % (ref 0–8)
ERYTHROCYTE [DISTWIDTH] IN BLOOD BY AUTOMATED COUNT: 12.3 % (ref 11.5–14.5)
EST. GFR  (AFRICAN AMERICAN): >60 ML/MIN/1.73 M^2
EST. GFR  (NON AFRICAN AMERICAN): >60 ML/MIN/1.73 M^2
GLUCOSE SERPL-MCNC: 116 MG/DL (ref 70–110)
HCT VFR BLD AUTO: 28.9 % (ref 37–48.5)
HGB BLD-MCNC: 9.7 G/DL (ref 12–16)
IMM GRANULOCYTES # BLD AUTO: 0.02 K/UL (ref 0–0.04)
IMM GRANULOCYTES NFR BLD AUTO: 0.5 % (ref 0–0.5)
LYMPHOCYTES # BLD AUTO: 1 K/UL (ref 1–4.8)
LYMPHOCYTES NFR BLD: 23.5 % (ref 18–48)
MCH RBC QN AUTO: 32.6 PG (ref 27–31)
MCHC RBC AUTO-ENTMCNC: 33.6 G/DL (ref 32–36)
MCV RBC AUTO: 97 FL (ref 82–98)
MONOCYTES # BLD AUTO: 0.5 K/UL (ref 0.3–1)
MONOCYTES NFR BLD: 10.4 % (ref 4–15)
NEUTROPHILS # BLD AUTO: 2.8 K/UL (ref 1.8–7.7)
NEUTROPHILS NFR BLD: 62 % (ref 38–73)
NRBC BLD-RTO: 0 /100 WBC
PHOSPHATE SERPL-MCNC: 3.7 MG/DL (ref 2.7–4.5)
PLATELET # BLD AUTO: 215 K/UL (ref 150–450)
PMV BLD AUTO: 10.1 FL (ref 9.2–12.9)
POTASSIUM SERPL-SCNC: 3.9 MMOL/L (ref 3.5–5.1)
PROT SERPL-MCNC: 6.3 G/DL (ref 6–8.4)
RBC # BLD AUTO: 2.98 M/UL (ref 4–5.4)
SODIUM SERPL-SCNC: 138 MMOL/L (ref 136–145)
WBC # BLD AUTO: 4.43 K/UL (ref 3.9–12.7)

## 2021-06-11 PROCEDURE — 36415 COLL VENOUS BLD VENIPUNCTURE: CPT | Performed by: PSYCHIATRY & NEUROLOGY

## 2021-06-11 PROCEDURE — 25000003 PHARM REV CODE 250: Performed by: NURSE PRACTITIONER

## 2021-06-11 PROCEDURE — 80053 COMPREHEN METABOLIC PANEL: CPT | Performed by: NURSE PRACTITIONER

## 2021-06-11 PROCEDURE — 97530 THERAPEUTIC ACTIVITIES: CPT | Mod: CQ

## 2021-06-11 PROCEDURE — 63600175 PHARM REV CODE 636 W HCPCS: Performed by: STUDENT IN AN ORGANIZED HEALTH CARE EDUCATION/TRAINING PROGRAM

## 2021-06-11 PROCEDURE — 25000003 PHARM REV CODE 250: Performed by: PHYSICIAN ASSISTANT

## 2021-06-11 PROCEDURE — 97116 GAIT TRAINING THERAPY: CPT | Mod: CQ

## 2021-06-11 PROCEDURE — 94761 N-INVAS EAR/PLS OXIMETRY MLT: CPT

## 2021-06-11 PROCEDURE — 99024 PR POST-OP FOLLOW-UP VISIT: ICD-10-PCS | Mod: ,,, | Performed by: PHYSICIAN ASSISTANT

## 2021-06-11 PROCEDURE — 94799 UNLISTED PULMONARY SVC/PX: CPT

## 2021-06-11 PROCEDURE — 11000001 HC ACUTE MED/SURG PRIVATE ROOM

## 2021-06-11 PROCEDURE — 84100 ASSAY OF PHOSPHORUS: CPT | Performed by: PSYCHIATRY & NEUROLOGY

## 2021-06-11 PROCEDURE — 85025 COMPLETE CBC W/AUTO DIFF WBC: CPT | Performed by: NURSE PRACTITIONER

## 2021-06-11 PROCEDURE — 99024 POSTOP FOLLOW-UP VISIT: CPT | Mod: ,,, | Performed by: PHYSICIAN ASSISTANT

## 2021-06-11 PROCEDURE — 25000003 PHARM REV CODE 250: Performed by: STUDENT IN AN ORGANIZED HEALTH CARE EDUCATION/TRAINING PROGRAM

## 2021-06-11 PROCEDURE — 97130 THER IVNTJ EA ADDL 15 MIN: CPT

## 2021-06-11 PROCEDURE — 97535 SELF CARE MNGMENT TRAINING: CPT

## 2021-06-11 PROCEDURE — 99900035 HC TECH TIME PER 15 MIN (STAT)

## 2021-06-11 RX ORDER — IPRATROPIUM BROMIDE AND ALBUTEROL SULFATE 2.5; .5 MG/3ML; MG/3ML
3 SOLUTION RESPIRATORY (INHALATION) EVERY 6 HOURS PRN
Status: DISCONTINUED | OUTPATIENT
Start: 2021-06-11 | End: 2021-06-12

## 2021-06-11 RX ORDER — HYDROCODONE BITARTRATE AND ACETAMINOPHEN 5; 325 MG/1; MG/1
1 TABLET ORAL EVERY 6 HOURS PRN
Qty: 56 TABLET | Refills: 0
Start: 2021-06-11 | End: 2021-06-30

## 2021-06-11 RX ORDER — AMOXICILLIN 250 MG
2 CAPSULE ORAL DAILY
Start: 2021-06-12 | End: 2021-06-30

## 2021-06-11 RX ORDER — IPRATROPIUM BROMIDE AND ALBUTEROL SULFATE 2.5; .5 MG/3ML; MG/3ML
3 SOLUTION RESPIRATORY (INHALATION) EVERY 6 HOURS PRN
Qty: 90 ML | Refills: 0 | Status: SHIPPED | OUTPATIENT
Start: 2021-06-11 | End: 2021-06-30

## 2021-06-11 RX ORDER — POLYETHYLENE GLYCOL 3350 17 G/17G
17 POWDER, FOR SOLUTION ORAL DAILY
Refills: 0
Start: 2021-06-12 | End: 2021-06-30

## 2021-06-11 RX ORDER — HEPARIN SODIUM 5000 [USP'U]/ML
5000 INJECTION, SOLUTION INTRAVENOUS; SUBCUTANEOUS EVERY 8 HOURS
Start: 2021-06-11 | End: 2021-06-30

## 2021-06-11 RX ORDER — ACETAMINOPHEN 325 MG/1
650 TABLET ORAL EVERY 4 HOURS PRN
Refills: 0
Start: 2021-06-11

## 2021-06-11 RX ORDER — HYDRALAZINE HYDROCHLORIDE 20 MG/ML
10 INJECTION INTRAMUSCULAR; INTRAVENOUS EVERY 6 HOURS PRN
Start: 2021-06-11 | End: 2021-06-30

## 2021-06-11 RX ADMIN — HEPARIN SODIUM 5000 UNITS: 5000 INJECTION INTRAVENOUS; SUBCUTANEOUS at 02:06

## 2021-06-11 RX ADMIN — DOCUSATE SODIUM 50MG AND SENNOSIDES 8.6MG 2 TABLET: 8.6; 5 TABLET, FILM COATED ORAL at 08:06

## 2021-06-11 RX ADMIN — HYDROCODONE BITARTRATE AND ACETAMINOPHEN 1 TABLET: 5; 325 TABLET ORAL at 09:06

## 2021-06-11 RX ADMIN — ROSUVASTATIN CALCIUM 20 MG: 20 TABLET, FILM COATED ORAL at 08:06

## 2021-06-11 RX ADMIN — HEPARIN SODIUM 5000 UNITS: 5000 INJECTION INTRAVENOUS; SUBCUTANEOUS at 06:06

## 2021-06-11 RX ADMIN — HEPARIN SODIUM 5000 UNITS: 5000 INJECTION INTRAVENOUS; SUBCUTANEOUS at 09:06

## 2021-06-11 RX ADMIN — HYDROCODONE BITARTRATE AND ACETAMINOPHEN 1 TABLET: 5; 325 TABLET ORAL at 06:06

## 2021-06-11 RX ADMIN — HYDROCODONE BITARTRATE AND ACETAMINOPHEN 1 TABLET: 5; 325 TABLET ORAL at 04:06

## 2021-06-11 RX ADMIN — LEVETIRACETAM 500 MG: 500 TABLET ORAL at 08:06

## 2021-06-11 RX ADMIN — SODIUM CHLORIDE 1000 ML: 0.9 INJECTION, SOLUTION INTRAVENOUS at 02:06

## 2021-06-11 RX ADMIN — POLYETHYLENE GLYCOL 3350 17 G: 17 POWDER, FOR SOLUTION ORAL at 08:06

## 2021-06-12 VITALS
BODY MASS INDEX: 27.46 KG/M2 | DIASTOLIC BLOOD PRESSURE: 75 MMHG | HEIGHT: 63 IN | TEMPERATURE: 99 F | HEART RATE: 89 BPM | OXYGEN SATURATION: 94 % | WEIGHT: 155 LBS | RESPIRATION RATE: 14 BRPM | SYSTOLIC BLOOD PRESSURE: 125 MMHG

## 2021-06-12 LAB
ALBUMIN SERPL BCP-MCNC: 3 G/DL (ref 3.5–5.2)
ALP SERPL-CCNC: 68 U/L (ref 55–135)
ALT SERPL W/O P-5'-P-CCNC: 13 U/L (ref 10–44)
ANION GAP SERPL CALC-SCNC: 9 MMOL/L (ref 8–16)
AST SERPL-CCNC: 21 U/L (ref 10–40)
BASOPHILS # BLD AUTO: 0.03 K/UL (ref 0–0.2)
BASOPHILS NFR BLD: 0.7 % (ref 0–1.9)
BILIRUB SERPL-MCNC: 0.3 MG/DL (ref 0.1–1)
BUN SERPL-MCNC: 9 MG/DL (ref 8–23)
CALCIUM SERPL-MCNC: 9.7 MG/DL (ref 8.7–10.5)
CHLORIDE SERPL-SCNC: 107 MMOL/L (ref 95–110)
CO2 SERPL-SCNC: 25 MMOL/L (ref 23–29)
CREAT SERPL-MCNC: 0.6 MG/DL (ref 0.5–1.4)
DIFFERENTIAL METHOD: ABNORMAL
EOSINOPHIL # BLD AUTO: 0.1 K/UL (ref 0–0.5)
EOSINOPHIL NFR BLD: 2.8 % (ref 0–8)
ERYTHROCYTE [DISTWIDTH] IN BLOOD BY AUTOMATED COUNT: 12.3 % (ref 11.5–14.5)
EST. GFR  (AFRICAN AMERICAN): >60 ML/MIN/1.73 M^2
EST. GFR  (NON AFRICAN AMERICAN): >60 ML/MIN/1.73 M^2
GLUCOSE SERPL-MCNC: 107 MG/DL (ref 70–110)
HCT VFR BLD AUTO: 28.3 % (ref 37–48.5)
HGB BLD-MCNC: 9.5 G/DL (ref 12–16)
IMM GRANULOCYTES # BLD AUTO: 0.02 K/UL (ref 0–0.04)
IMM GRANULOCYTES NFR BLD AUTO: 0.5 % (ref 0–0.5)
LYMPHOCYTES # BLD AUTO: 1.1 K/UL (ref 1–4.8)
LYMPHOCYTES NFR BLD: 25.4 % (ref 18–48)
MCH RBC QN AUTO: 32.4 PG (ref 27–31)
MCHC RBC AUTO-ENTMCNC: 33.6 G/DL (ref 32–36)
MCV RBC AUTO: 97 FL (ref 82–98)
MONOCYTES # BLD AUTO: 0.5 K/UL (ref 0.3–1)
MONOCYTES NFR BLD: 11.7 % (ref 4–15)
NEUTROPHILS # BLD AUTO: 2.5 K/UL (ref 1.8–7.7)
NEUTROPHILS NFR BLD: 58.9 % (ref 38–73)
NRBC BLD-RTO: 0 /100 WBC
PHOSPHATE SERPL-MCNC: 3.7 MG/DL (ref 2.7–4.5)
PLATELET # BLD AUTO: 218 K/UL (ref 150–450)
PMV BLD AUTO: 10.1 FL (ref 9.2–12.9)
POTASSIUM SERPL-SCNC: 3.9 MMOL/L (ref 3.5–5.1)
PROT SERPL-MCNC: 6.1 G/DL (ref 6–8.4)
RBC # BLD AUTO: 2.93 M/UL (ref 4–5.4)
SODIUM SERPL-SCNC: 141 MMOL/L (ref 136–145)
WBC # BLD AUTO: 4.26 K/UL (ref 3.9–12.7)

## 2021-06-12 PROCEDURE — 25000003 PHARM REV CODE 250: Performed by: PHYSICIAN ASSISTANT

## 2021-06-12 PROCEDURE — 99232 SBSQ HOSP IP/OBS MODERATE 35: CPT | Mod: ,,, | Performed by: STUDENT IN AN ORGANIZED HEALTH CARE EDUCATION/TRAINING PROGRAM

## 2021-06-12 PROCEDURE — 99223 1ST HOSP IP/OBS HIGH 75: CPT | Mod: ,,, | Performed by: STUDENT IN AN ORGANIZED HEALTH CARE EDUCATION/TRAINING PROGRAM

## 2021-06-12 PROCEDURE — 99223 PR INITIAL HOSPITAL CARE,LEVL III: ICD-10-PCS | Mod: ,,, | Performed by: STUDENT IN AN ORGANIZED HEALTH CARE EDUCATION/TRAINING PROGRAM

## 2021-06-12 PROCEDURE — 36415 COLL VENOUS BLD VENIPUNCTURE: CPT | Performed by: PSYCHIATRY & NEUROLOGY

## 2021-06-12 PROCEDURE — 63600175 PHARM REV CODE 636 W HCPCS: Performed by: STUDENT IN AN ORGANIZED HEALTH CARE EDUCATION/TRAINING PROGRAM

## 2021-06-12 PROCEDURE — 25000003 PHARM REV CODE 250: Performed by: NURSE PRACTITIONER

## 2021-06-12 PROCEDURE — 99232 PR SUBSEQUENT HOSPITAL CARE,LEVL II: ICD-10-PCS | Mod: ,,, | Performed by: STUDENT IN AN ORGANIZED HEALTH CARE EDUCATION/TRAINING PROGRAM

## 2021-06-12 PROCEDURE — 80053 COMPREHEN METABOLIC PANEL: CPT | Performed by: NURSE PRACTITIONER

## 2021-06-12 PROCEDURE — 99024 PR POST-OP FOLLOW-UP VISIT: ICD-10-PCS | Mod: ,,, | Performed by: PHYSICIAN ASSISTANT

## 2021-06-12 PROCEDURE — 85025 COMPLETE CBC W/AUTO DIFF WBC: CPT | Performed by: NURSE PRACTITIONER

## 2021-06-12 PROCEDURE — 84100 ASSAY OF PHOSPHORUS: CPT | Performed by: PSYCHIATRY & NEUROLOGY

## 2021-06-12 PROCEDURE — 99024 POSTOP FOLLOW-UP VISIT: CPT | Mod: ,,, | Performed by: PHYSICIAN ASSISTANT

## 2021-06-12 RX ADMIN — POLYETHYLENE GLYCOL 3350 17 G: 17 POWDER, FOR SOLUTION ORAL at 08:06

## 2021-06-12 RX ADMIN — DOCUSATE SODIUM 50MG AND SENNOSIDES 8.6MG 2 TABLET: 8.6; 5 TABLET, FILM COATED ORAL at 08:06

## 2021-06-12 RX ADMIN — ACETAMINOPHEN 650 MG: 325 TABLET ORAL at 08:06

## 2021-06-12 RX ADMIN — ROSUVASTATIN CALCIUM 20 MG: 20 TABLET, FILM COATED ORAL at 08:06

## 2021-06-12 RX ADMIN — HEPARIN SODIUM 5000 UNITS: 5000 INJECTION INTRAVENOUS; SUBCUTANEOUS at 05:06

## 2021-06-12 RX ADMIN — ACETAMINOPHEN 650 MG: 325 TABLET ORAL at 02:06

## 2021-06-30 ENCOUNTER — OFFICE VISIT (OUTPATIENT)
Dept: INTERNAL MEDICINE | Facility: CLINIC | Age: 71
End: 2021-06-30
Payer: MEDICARE

## 2021-06-30 VITALS
SYSTOLIC BLOOD PRESSURE: 106 MMHG | BODY MASS INDEX: 26.91 KG/M2 | HEART RATE: 68 BPM | TEMPERATURE: 98 F | HEIGHT: 63 IN | WEIGHT: 151.88 LBS | OXYGEN SATURATION: 98 % | DIASTOLIC BLOOD PRESSURE: 72 MMHG

## 2021-06-30 DIAGNOSIS — R42 LIGHT HEADEDNESS: ICD-10-CM

## 2021-06-30 DIAGNOSIS — S06.5XAA SUBDURAL HEMATOMA: ICD-10-CM

## 2021-06-30 DIAGNOSIS — D50.9 IRON DEFICIENCY ANEMIA, UNSPECIFIED IRON DEFICIENCY ANEMIA TYPE: ICD-10-CM

## 2021-06-30 DIAGNOSIS — I10 ESSENTIAL HYPERTENSION: ICD-10-CM

## 2021-06-30 DIAGNOSIS — Z91.81 PERSONAL HISTORY OF FALL: ICD-10-CM

## 2021-06-30 DIAGNOSIS — E53.8 VITAMIN B12 DEFICIENCY: ICD-10-CM

## 2021-06-30 DIAGNOSIS — R51.9 GENERALIZED HEADACHES: ICD-10-CM

## 2021-06-30 PROBLEM — R29.898 WEAKNESS OF RIGHT LOWER EXTREMITY: Status: RESOLVED | Noted: 2021-06-03 | Resolved: 2021-06-30

## 2021-06-30 PROCEDURE — 1126F AMNT PAIN NOTED NONE PRSNT: CPT | Mod: S$GLB,,, | Performed by: FAMILY MEDICINE

## 2021-06-30 PROCEDURE — 99999 PR PBB SHADOW E&M-EST. PATIENT-LVL III: CPT | Mod: PBBFAC,,, | Performed by: FAMILY MEDICINE

## 2021-06-30 PROCEDURE — 1126F PR PAIN SEVERITY QUANTIFIED, NO PAIN PRESENT: ICD-10-PCS | Mod: S$GLB,,, | Performed by: FAMILY MEDICINE

## 2021-06-30 PROCEDURE — 3008F BODY MASS INDEX DOCD: CPT | Mod: CPTII,S$GLB,, | Performed by: FAMILY MEDICINE

## 2021-06-30 PROCEDURE — 1100F PR PT FALLS ASSESS DOC 2+ FALLS/FALL W/INJURY/YR: ICD-10-PCS | Mod: CPTII,S$GLB,, | Performed by: FAMILY MEDICINE

## 2021-06-30 PROCEDURE — 99214 PR OFFICE/OUTPT VISIT, EST, LEVL IV, 30-39 MIN: ICD-10-PCS | Mod: S$GLB,,, | Performed by: FAMILY MEDICINE

## 2021-06-30 PROCEDURE — 3288F FALL RISK ASSESSMENT DOCD: CPT | Mod: CPTII,S$GLB,, | Performed by: FAMILY MEDICINE

## 2021-06-30 PROCEDURE — 1100F PTFALLS ASSESS-DOCD GE2>/YR: CPT | Mod: CPTII,S$GLB,, | Performed by: FAMILY MEDICINE

## 2021-06-30 PROCEDURE — 99214 OFFICE O/P EST MOD 30 MIN: CPT | Mod: S$GLB,,, | Performed by: FAMILY MEDICINE

## 2021-06-30 PROCEDURE — 3008F PR BODY MASS INDEX (BMI) DOCUMENTED: ICD-10-PCS | Mod: CPTII,S$GLB,, | Performed by: FAMILY MEDICINE

## 2021-06-30 PROCEDURE — 99999 PR PBB SHADOW E&M-EST. PATIENT-LVL III: ICD-10-PCS | Mod: PBBFAC,,, | Performed by: FAMILY MEDICINE

## 2021-06-30 PROCEDURE — 3288F PR FALLS RISK ASSESSMENT DOCUMENTED: ICD-10-PCS | Mod: CPTII,S$GLB,, | Performed by: FAMILY MEDICINE

## 2021-06-30 RX ORDER — LANOLIN ALCOHOL/MO/W.PET/CERES
1000 CREAM (GRAM) TOPICAL DAILY
Qty: 30 TABLET | Refills: 11 | Status: SHIPPED | OUTPATIENT
Start: 2021-06-30

## 2021-07-01 ENCOUNTER — TELEPHONE (OUTPATIENT)
Dept: INTERNAL MEDICINE | Facility: CLINIC | Age: 71
End: 2021-07-01

## 2021-07-01 PROCEDURE — G0180 MD CERTIFICATION HHA PATIENT: HCPCS | Mod: ,,, | Performed by: PHYSICAL MEDICINE & REHABILITATION

## 2021-07-01 PROCEDURE — G0180 PR HOME HEALTH MD CERTIFICATION: ICD-10-PCS | Mod: ,,, | Performed by: PHYSICAL MEDICINE & REHABILITATION

## 2021-07-06 ENCOUNTER — PATIENT OUTREACH (OUTPATIENT)
Dept: ADMINISTRATIVE | Facility: HOSPITAL | Age: 71
End: 2021-07-06

## 2021-07-06 ENCOUNTER — PATIENT MESSAGE (OUTPATIENT)
Dept: ADMINISTRATIVE | Facility: HOSPITAL | Age: 71
End: 2021-07-06

## 2021-07-14 ENCOUNTER — HOSPITAL ENCOUNTER (OUTPATIENT)
Dept: RADIOLOGY | Facility: HOSPITAL | Age: 71
Discharge: HOME | End: 2021-07-14
Attending: PHYSICIAN ASSISTANT
Payer: MEDICARE

## 2021-07-14 DIAGNOSIS — S06.5XAA SUBDURAL HEMATOMA: ICD-10-CM

## 2021-07-14 PROCEDURE — 70450 CT HEAD/BRAIN W/O DYE: CPT | Mod: 26,,, | Performed by: RADIOLOGY

## 2021-07-14 PROCEDURE — 70450 CT HEAD/BRAIN W/O DYE: CPT | Mod: TC

## 2021-07-14 PROCEDURE — 70450 CT HEAD WITHOUT CONTRAST: ICD-10-PCS | Mod: 26,,, | Performed by: RADIOLOGY

## 2021-07-17 ENCOUNTER — DOCUMENT SCAN (OUTPATIENT)
Dept: HOME HEALTH SERVICES | Facility: HOSPITAL | Age: 71
End: 2021-07-17
Payer: MEDICARE

## 2021-07-20 ENCOUNTER — EXTERNAL HOME HEALTH (OUTPATIENT)
Dept: HOME HEALTH SERVICES | Facility: HOSPITAL | Age: 71
End: 2021-07-20
Payer: MEDICARE

## 2021-07-27 ENCOUNTER — OFFICE VISIT (OUTPATIENT)
Dept: NEUROSURGERY | Facility: CLINIC | Age: 71
End: 2021-07-27
Payer: MEDICARE

## 2021-07-27 VITALS
TEMPERATURE: 98 F | SYSTOLIC BLOOD PRESSURE: 138 MMHG | HEIGHT: 63 IN | WEIGHT: 151.25 LBS | DIASTOLIC BLOOD PRESSURE: 81 MMHG | BODY MASS INDEX: 26.8 KG/M2 | HEART RATE: 69 BPM

## 2021-07-27 DIAGNOSIS — S06.5XAA SUBDURAL HEMATOMA: Primary | ICD-10-CM

## 2021-07-27 PROCEDURE — 1126F AMNT PAIN NOTED NONE PRSNT: CPT | Mod: CPTII,S$GLB,, | Performed by: NEUROLOGICAL SURGERY

## 2021-07-27 PROCEDURE — 3079F PR MOST RECENT DIASTOLIC BLOOD PRESSURE 80-89 MM HG: ICD-10-PCS | Mod: CPTII,S$GLB,, | Performed by: NEUROLOGICAL SURGERY

## 2021-07-27 PROCEDURE — 3008F BODY MASS INDEX DOCD: CPT | Mod: CPTII,S$GLB,, | Performed by: NEUROLOGICAL SURGERY

## 2021-07-27 PROCEDURE — 3044F HG A1C LEVEL LT 7.0%: CPT | Mod: CPTII,S$GLB,, | Performed by: NEUROLOGICAL SURGERY

## 2021-07-27 PROCEDURE — 99024 PR POST-OP FOLLOW-UP VISIT: ICD-10-PCS | Mod: S$GLB,,, | Performed by: NEUROLOGICAL SURGERY

## 2021-07-27 PROCEDURE — 3044F PR MOST RECENT HEMOGLOBIN A1C LEVEL <7.0%: ICD-10-PCS | Mod: CPTII,S$GLB,, | Performed by: NEUROLOGICAL SURGERY

## 2021-07-27 PROCEDURE — 1126F PR PAIN SEVERITY QUANTIFIED, NO PAIN PRESENT: ICD-10-PCS | Mod: CPTII,S$GLB,, | Performed by: NEUROLOGICAL SURGERY

## 2021-07-27 PROCEDURE — 3079F DIAST BP 80-89 MM HG: CPT | Mod: CPTII,S$GLB,, | Performed by: NEUROLOGICAL SURGERY

## 2021-07-27 PROCEDURE — 3075F PR MOST RECENT SYSTOLIC BLOOD PRESS GE 130-139MM HG: ICD-10-PCS | Mod: CPTII,S$GLB,, | Performed by: NEUROLOGICAL SURGERY

## 2021-07-27 PROCEDURE — 3008F PR BODY MASS INDEX (BMI) DOCUMENTED: ICD-10-PCS | Mod: CPTII,S$GLB,, | Performed by: NEUROLOGICAL SURGERY

## 2021-07-27 PROCEDURE — 3288F PR FALLS RISK ASSESSMENT DOCUMENTED: ICD-10-PCS | Mod: CPTII,S$GLB,, | Performed by: NEUROLOGICAL SURGERY

## 2021-07-27 PROCEDURE — 3288F FALL RISK ASSESSMENT DOCD: CPT | Mod: CPTII,S$GLB,, | Performed by: NEUROLOGICAL SURGERY

## 2021-07-27 PROCEDURE — 1159F MED LIST DOCD IN RCRD: CPT | Mod: CPTII,S$GLB,, | Performed by: NEUROLOGICAL SURGERY

## 2021-07-27 PROCEDURE — 3075F SYST BP GE 130 - 139MM HG: CPT | Mod: CPTII,S$GLB,, | Performed by: NEUROLOGICAL SURGERY

## 2021-07-27 PROCEDURE — 1100F PTFALLS ASSESS-DOCD GE2>/YR: CPT | Mod: CPTII,S$GLB,, | Performed by: NEUROLOGICAL SURGERY

## 2021-07-27 PROCEDURE — 99999 PR PBB SHADOW E&M-EST. PATIENT-LVL III: ICD-10-PCS | Mod: PBBFAC,,, | Performed by: NEUROLOGICAL SURGERY

## 2021-07-27 PROCEDURE — 99999 PR PBB SHADOW E&M-EST. PATIENT-LVL III: CPT | Mod: PBBFAC,,, | Performed by: NEUROLOGICAL SURGERY

## 2021-07-27 PROCEDURE — 1159F PR MEDICATION LIST DOCUMENTED IN MEDICAL RECORD: ICD-10-PCS | Mod: CPTII,S$GLB,, | Performed by: NEUROLOGICAL SURGERY

## 2021-07-27 PROCEDURE — 1100F PR PT FALLS ASSESS DOC 2+ FALLS/FALL W/INJURY/YR: ICD-10-PCS | Mod: CPTII,S$GLB,, | Performed by: NEUROLOGICAL SURGERY

## 2021-07-27 PROCEDURE — 99024 POSTOP FOLLOW-UP VISIT: CPT | Mod: S$GLB,,, | Performed by: NEUROLOGICAL SURGERY

## 2021-08-05 ENCOUNTER — PATIENT MESSAGE (OUTPATIENT)
Dept: NEUROSURGERY | Facility: CLINIC | Age: 71
End: 2021-08-05

## 2021-08-11 ENCOUNTER — HOSPITAL ENCOUNTER (OUTPATIENT)
Dept: RADIOLOGY | Facility: CLINIC | Age: 71
Discharge: HOME | End: 2021-08-11
Attending: FAMILY MEDICINE
Payer: MEDICARE

## 2021-08-11 ENCOUNTER — HOSPITAL ENCOUNTER (OUTPATIENT)
Dept: RADIOLOGY | Facility: HOSPITAL | Age: 71
Discharge: HOME | End: 2021-08-11
Attending: FAMILY MEDICINE
Payer: MEDICARE

## 2021-08-11 DIAGNOSIS — Z12.31 SCREENING MAMMOGRAM, ENCOUNTER FOR: ICD-10-CM

## 2021-08-11 DIAGNOSIS — Z78.0 POST-MENOPAUSE: ICD-10-CM

## 2021-08-11 PROCEDURE — 77080 DXA BONE DENSITY AXIAL: CPT | Mod: 26,,, | Performed by: INTERNAL MEDICINE

## 2021-08-11 PROCEDURE — 77067 SCR MAMMO BI INCL CAD: CPT | Mod: TC

## 2021-08-11 PROCEDURE — 77063 BREAST TOMOSYNTHESIS BI: CPT | Mod: 26,,, | Performed by: RADIOLOGY

## 2021-08-11 PROCEDURE — 77067 SCR MAMMO BI INCL CAD: CPT | Mod: 26,,, | Performed by: RADIOLOGY

## 2021-08-11 PROCEDURE — 77080 DEXA BONE DENSITY SPINE HIP: ICD-10-PCS | Mod: 26,,, | Performed by: INTERNAL MEDICINE

## 2021-08-11 PROCEDURE — 77063 MAMMO DIGITAL SCREENING BILAT WITH TOMO: ICD-10-PCS | Mod: 26,,, | Performed by: RADIOLOGY

## 2021-08-11 PROCEDURE — 77080 DXA BONE DENSITY AXIAL: CPT | Mod: TC

## 2021-08-11 PROCEDURE — 77067 MAMMO DIGITAL SCREENING BILAT WITH TOMO: ICD-10-PCS | Mod: 26,,, | Performed by: RADIOLOGY

## 2021-09-03 ENCOUNTER — PATIENT MESSAGE (OUTPATIENT)
Dept: NEUROSURGERY | Facility: CLINIC | Age: 71
End: 2021-09-03

## 2021-09-13 ENCOUNTER — TELEPHONE (OUTPATIENT)
Dept: INTERNAL MEDICINE | Facility: CLINIC | Age: 71
End: 2021-09-13

## 2021-09-13 DIAGNOSIS — R94.7 ABNORMAL DEXAMETHASONE SUPPRESSION TEST: Primary | ICD-10-CM

## 2021-09-13 DIAGNOSIS — R93.7 ABNORMAL BONE DENSITY SCREENING: ICD-10-CM

## 2021-09-14 ENCOUNTER — TELEPHONE (OUTPATIENT)
Dept: INTERNAL MEDICINE | Facility: CLINIC | Age: 71
End: 2021-09-14

## 2021-09-18 ENCOUNTER — PATIENT MESSAGE (OUTPATIENT)
Dept: NEUROSURGERY | Facility: CLINIC | Age: 71
End: 2021-09-18

## 2021-09-23 ENCOUNTER — OFFICE VISIT (OUTPATIENT)
Dept: NEUROSURGERY | Facility: CLINIC | Age: 71
End: 2021-09-23
Payer: MEDICARE

## 2021-09-23 DIAGNOSIS — S06.5XAA SUBDURAL HEMATOMA: Primary | ICD-10-CM

## 2021-09-23 PROCEDURE — 99024 POSTOP FOLLOW-UP VISIT: CPT | Mod: 95,,, | Performed by: NEUROLOGICAL SURGERY

## 2021-09-23 PROCEDURE — 4010F PR ACE/ARB THEARPY RXD/TAKEN: ICD-10-PCS | Mod: CPTII,95,, | Performed by: NEUROLOGICAL SURGERY

## 2021-09-23 PROCEDURE — 3044F HG A1C LEVEL LT 7.0%: CPT | Mod: CPTII,95,, | Performed by: NEUROLOGICAL SURGERY

## 2021-09-23 PROCEDURE — 4010F ACE/ARB THERAPY RXD/TAKEN: CPT | Mod: CPTII,95,, | Performed by: NEUROLOGICAL SURGERY

## 2021-09-23 PROCEDURE — 3044F PR MOST RECENT HEMOGLOBIN A1C LEVEL <7.0%: ICD-10-PCS | Mod: CPTII,95,, | Performed by: NEUROLOGICAL SURGERY

## 2021-09-23 PROCEDURE — 99024 PR POST-OP FOLLOW-UP VISIT: ICD-10-PCS | Mod: 95,,, | Performed by: NEUROLOGICAL SURGERY

## 2021-10-04 ENCOUNTER — PATIENT MESSAGE (OUTPATIENT)
Dept: ADMINISTRATIVE | Facility: HOSPITAL | Age: 71
End: 2021-10-04

## 2021-10-13 ENCOUNTER — PATIENT MESSAGE (OUTPATIENT)
Dept: INTERNAL MEDICINE | Facility: CLINIC | Age: 71
End: 2021-10-13
Payer: MEDICARE

## 2021-10-13 ENCOUNTER — PATIENT MESSAGE (OUTPATIENT)
Dept: NEUROSURGERY | Facility: CLINIC | Age: 71
End: 2021-10-13
Payer: MEDICARE

## 2021-10-15 ENCOUNTER — PATIENT MESSAGE (OUTPATIENT)
Dept: INTERNAL MEDICINE | Facility: CLINIC | Age: 71
End: 2021-10-15
Payer: MEDICARE

## 2021-10-18 ENCOUNTER — PATIENT MESSAGE (OUTPATIENT)
Dept: INTERNAL MEDICINE | Facility: CLINIC | Age: 71
End: 2021-10-18
Payer: MEDICARE

## 2021-10-18 ENCOUNTER — PATIENT MESSAGE (OUTPATIENT)
Dept: NEUROSURGERY | Facility: CLINIC | Age: 71
End: 2021-10-18
Payer: MEDICARE

## 2021-10-18 ENCOUNTER — TELEPHONE (OUTPATIENT)
Dept: NEUROSURGERY | Facility: CLINIC | Age: 71
End: 2021-10-18

## 2021-10-18 DIAGNOSIS — S06.5XAA SUBDURAL HEMATOMA: Primary | ICD-10-CM

## 2021-10-19 ENCOUNTER — TELEPHONE (OUTPATIENT)
Dept: NEUROLOGY | Facility: CLINIC | Age: 71
End: 2021-10-19

## 2021-11-11 ENCOUNTER — HOSPITAL ENCOUNTER (OUTPATIENT)
Dept: RADIOLOGY | Facility: HOSPITAL | Age: 71
Discharge: HOME | End: 2021-11-11
Attending: FAMILY MEDICINE
Payer: MEDICARE

## 2021-11-11 ENCOUNTER — OFFICE VISIT (OUTPATIENT)
Dept: INTERNAL MEDICINE | Facility: CLINIC | Age: 71
End: 2021-11-11
Payer: MEDICARE

## 2021-11-11 ENCOUNTER — PATIENT MESSAGE (OUTPATIENT)
Dept: ADMINISTRATIVE | Facility: OTHER | Age: 71
End: 2021-11-11
Payer: MEDICARE

## 2021-11-11 VITALS
DIASTOLIC BLOOD PRESSURE: 76 MMHG | OXYGEN SATURATION: 98 % | HEIGHT: 63 IN | TEMPERATURE: 99 F | WEIGHT: 157.63 LBS | BODY MASS INDEX: 27.93 KG/M2 | HEART RATE: 83 BPM | SYSTOLIC BLOOD PRESSURE: 116 MMHG

## 2021-11-11 DIAGNOSIS — R20.0 BILATERAL HAND NUMBNESS: ICD-10-CM

## 2021-11-11 DIAGNOSIS — Z79.899 OTHER LONG TERM (CURRENT) DRUG THERAPY: ICD-10-CM

## 2021-11-11 DIAGNOSIS — R51.9 PERSISTENT HEADACHES: ICD-10-CM

## 2021-11-11 DIAGNOSIS — R51.9 PERSISTENT HEADACHES: Primary | ICD-10-CM

## 2021-11-11 PROCEDURE — 4010F PR ACE/ARB THEARPY RXD/TAKEN: ICD-10-PCS | Mod: CPTII,S$GLB,, | Performed by: FAMILY MEDICINE

## 2021-11-11 PROCEDURE — 99999 PR PBB SHADOW E&M-EST. PATIENT-LVL IV: ICD-10-PCS | Mod: PBBFAC,,, | Performed by: FAMILY MEDICINE

## 2021-11-11 PROCEDURE — 99999 PR PBB SHADOW E&M-EST. PATIENT-LVL IV: CPT | Mod: PBBFAC,,, | Performed by: FAMILY MEDICINE

## 2021-11-11 PROCEDURE — 99213 OFFICE O/P EST LOW 20 MIN: CPT | Mod: S$GLB,,, | Performed by: FAMILY MEDICINE

## 2021-11-11 PROCEDURE — 4010F ACE/ARB THERAPY RXD/TAKEN: CPT | Mod: CPTII,S$GLB,, | Performed by: FAMILY MEDICINE

## 2021-11-11 PROCEDURE — 70551 MRI BRAIN STEM W/O DYE: CPT | Mod: TC

## 2021-11-11 PROCEDURE — 70551 MRI BRAIN WITHOUT CONTRAST: ICD-10-PCS | Mod: 26,,, | Performed by: RADIOLOGY

## 2021-11-11 PROCEDURE — 99213 PR OFFICE/OUTPT VISIT, EST, LEVL III, 20-29 MIN: ICD-10-PCS | Mod: S$GLB,,, | Performed by: FAMILY MEDICINE

## 2021-11-11 PROCEDURE — 70551 MRI BRAIN STEM W/O DYE: CPT | Mod: 26,,, | Performed by: RADIOLOGY

## 2021-11-11 RX ORDER — CALCIUM CARBONATE 500(1250)
TABLET,CHEWABLE ORAL
COMMUNITY
Start: 2021-09-19

## 2021-11-12 ENCOUNTER — TELEPHONE (OUTPATIENT)
Dept: INTERNAL MEDICINE | Facility: CLINIC | Age: 71
End: 2021-11-12
Payer: MEDICARE

## 2021-11-12 DIAGNOSIS — R73.03 PREDIABETES: ICD-10-CM

## 2021-11-16 ENCOUNTER — HOSPITAL ENCOUNTER (OUTPATIENT)
Dept: RADIOLOGY | Facility: HOSPITAL | Age: 71
Discharge: HOME | End: 2021-11-16
Attending: NEUROLOGICAL SURGERY
Payer: MEDICARE

## 2021-11-16 ENCOUNTER — OFFICE VISIT (OUTPATIENT)
Dept: NEUROSURGERY | Facility: CLINIC | Age: 71
End: 2021-11-16
Payer: MEDICARE

## 2021-11-16 VITALS
HEART RATE: 77 BPM | BODY MASS INDEX: 27.82 KG/M2 | SYSTOLIC BLOOD PRESSURE: 115 MMHG | DIASTOLIC BLOOD PRESSURE: 81 MMHG | HEIGHT: 63 IN | TEMPERATURE: 98 F | WEIGHT: 157 LBS

## 2021-11-16 DIAGNOSIS — S06.5XAA SUBDURAL HEMATOMA: ICD-10-CM

## 2021-11-16 DIAGNOSIS — R20.2 PARESTHESIA OF BOTH HANDS: Primary | ICD-10-CM

## 2021-11-16 PROCEDURE — 1101F PT FALLS ASSESS-DOCD LE1/YR: CPT | Mod: CPTII,S$GLB,, | Performed by: NEUROLOGICAL SURGERY

## 2021-11-16 PROCEDURE — 70450 CT HEAD/BRAIN W/O DYE: CPT | Mod: 26,,, | Performed by: RADIOLOGY

## 2021-11-16 PROCEDURE — 3008F BODY MASS INDEX DOCD: CPT | Mod: CPTII,S$GLB,, | Performed by: NEUROLOGICAL SURGERY

## 2021-11-16 PROCEDURE — 1125F AMNT PAIN NOTED PAIN PRSNT: CPT | Mod: CPTII,S$GLB,, | Performed by: NEUROLOGICAL SURGERY

## 2021-11-16 PROCEDURE — 4010F ACE/ARB THERAPY RXD/TAKEN: CPT | Mod: CPTII,S$GLB,, | Performed by: NEUROLOGICAL SURGERY

## 2021-11-16 PROCEDURE — 3008F PR BODY MASS INDEX (BMI) DOCUMENTED: ICD-10-PCS | Mod: CPTII,S$GLB,, | Performed by: NEUROLOGICAL SURGERY

## 2021-11-16 PROCEDURE — 3079F DIAST BP 80-89 MM HG: CPT | Mod: CPTII,S$GLB,, | Performed by: NEUROLOGICAL SURGERY

## 2021-11-16 PROCEDURE — 1125F PR PAIN SEVERITY QUANTIFIED, PAIN PRESENT: ICD-10-PCS | Mod: CPTII,S$GLB,, | Performed by: NEUROLOGICAL SURGERY

## 2021-11-16 PROCEDURE — 99999 PR PBB SHADOW E&M-EST. PATIENT-LVL III: ICD-10-PCS | Mod: PBBFAC,,, | Performed by: NEUROLOGICAL SURGERY

## 2021-11-16 PROCEDURE — 1159F PR MEDICATION LIST DOCUMENTED IN MEDICAL RECORD: ICD-10-PCS | Mod: CPTII,S$GLB,, | Performed by: NEUROLOGICAL SURGERY

## 2021-11-16 PROCEDURE — 99214 PR OFFICE/OUTPT VISIT, EST, LEVL IV, 30-39 MIN: ICD-10-PCS | Mod: S$GLB,,, | Performed by: NEUROLOGICAL SURGERY

## 2021-11-16 PROCEDURE — 3079F PR MOST RECENT DIASTOLIC BLOOD PRESSURE 80-89 MM HG: ICD-10-PCS | Mod: CPTII,S$GLB,, | Performed by: NEUROLOGICAL SURGERY

## 2021-11-16 PROCEDURE — 3074F PR MOST RECENT SYSTOLIC BLOOD PRESSURE < 130 MM HG: ICD-10-PCS | Mod: CPTII,S$GLB,, | Performed by: NEUROLOGICAL SURGERY

## 2021-11-16 PROCEDURE — 70450 CT HEAD/BRAIN W/O DYE: CPT | Mod: TC

## 2021-11-16 PROCEDURE — 1159F MED LIST DOCD IN RCRD: CPT | Mod: CPTII,S$GLB,, | Performed by: NEUROLOGICAL SURGERY

## 2021-11-16 PROCEDURE — 3074F SYST BP LT 130 MM HG: CPT | Mod: CPTII,S$GLB,, | Performed by: NEUROLOGICAL SURGERY

## 2021-11-16 PROCEDURE — 99999 PR PBB SHADOW E&M-EST. PATIENT-LVL III: CPT | Mod: PBBFAC,,, | Performed by: NEUROLOGICAL SURGERY

## 2021-11-16 PROCEDURE — 99214 OFFICE O/P EST MOD 30 MIN: CPT | Mod: S$GLB,,, | Performed by: NEUROLOGICAL SURGERY

## 2021-11-16 PROCEDURE — 3044F HG A1C LEVEL LT 7.0%: CPT | Mod: CPTII,S$GLB,, | Performed by: NEUROLOGICAL SURGERY

## 2021-11-16 PROCEDURE — 3288F FALL RISK ASSESSMENT DOCD: CPT | Mod: CPTII,S$GLB,, | Performed by: NEUROLOGICAL SURGERY

## 2021-11-16 PROCEDURE — 4010F PR ACE/ARB THEARPY RXD/TAKEN: ICD-10-PCS | Mod: CPTII,S$GLB,, | Performed by: NEUROLOGICAL SURGERY

## 2021-11-16 PROCEDURE — 70450 CT HEAD WITHOUT CONTRAST: ICD-10-PCS | Mod: 26,,, | Performed by: RADIOLOGY

## 2021-11-16 PROCEDURE — 3044F PR MOST RECENT HEMOGLOBIN A1C LEVEL <7.0%: ICD-10-PCS | Mod: CPTII,S$GLB,, | Performed by: NEUROLOGICAL SURGERY

## 2021-11-16 PROCEDURE — 1101F PR PT FALLS ASSESS DOC 0-1 FALLS W/OUT INJ PAST YR: ICD-10-PCS | Mod: CPTII,S$GLB,, | Performed by: NEUROLOGICAL SURGERY

## 2021-11-16 PROCEDURE — 3288F PR FALLS RISK ASSESSMENT DOCUMENTED: ICD-10-PCS | Mod: CPTII,S$GLB,, | Performed by: NEUROLOGICAL SURGERY

## 2021-11-22 ENCOUNTER — PATIENT MESSAGE (OUTPATIENT)
Dept: NEUROSURGERY | Facility: CLINIC | Age: 71
End: 2021-11-22
Payer: MEDICARE

## 2021-11-24 ENCOUNTER — TELEPHONE (OUTPATIENT)
Dept: NEUROSURGERY | Facility: CLINIC | Age: 71
End: 2021-11-24
Payer: MEDICARE

## 2021-11-24 DIAGNOSIS — Z74.09 IMPAIRED MOBILITY AND ADLS: ICD-10-CM

## 2021-11-24 DIAGNOSIS — S06.5XAA SUBDURAL HEMATOMA: Primary | ICD-10-CM

## 2021-11-24 DIAGNOSIS — Z78.9 IMPAIRED MOBILITY AND ADLS: ICD-10-CM

## 2021-11-24 DIAGNOSIS — R29.898 WEAKNESS OF FOOT, RIGHT: ICD-10-CM

## 2021-11-30 ENCOUNTER — PATIENT OUTREACH (OUTPATIENT)
Dept: ADMINISTRATIVE | Facility: OTHER | Age: 71
End: 2021-11-30
Payer: MEDICARE

## 2021-12-01 ENCOUNTER — OFFICE VISIT (OUTPATIENT)
Dept: NEUROLOGY | Facility: CLINIC | Age: 71
End: 2021-12-01
Payer: MEDICARE

## 2021-12-01 VITALS
HEIGHT: 63 IN | WEIGHT: 157.88 LBS | DIASTOLIC BLOOD PRESSURE: 78 MMHG | HEART RATE: 79 BPM | BODY MASS INDEX: 27.97 KG/M2 | SYSTOLIC BLOOD PRESSURE: 113 MMHG

## 2021-12-01 DIAGNOSIS — S06.5XAA SUBDURAL HEMATOMA: ICD-10-CM

## 2021-12-01 PROCEDURE — 99214 OFFICE O/P EST MOD 30 MIN: CPT | Mod: S$GLB,,, | Performed by: NURSE PRACTITIONER

## 2021-12-01 PROCEDURE — 4010F ACE/ARB THERAPY RXD/TAKEN: CPT | Mod: CPTII,S$GLB,, | Performed by: NURSE PRACTITIONER

## 2021-12-01 PROCEDURE — 99999 PR PBB SHADOW E&M-EST. PATIENT-LVL III: ICD-10-PCS | Mod: PBBFAC,,, | Performed by: NURSE PRACTITIONER

## 2021-12-01 PROCEDURE — 4010F PR ACE/ARB THEARPY RXD/TAKEN: ICD-10-PCS | Mod: CPTII,S$GLB,, | Performed by: NURSE PRACTITIONER

## 2021-12-01 PROCEDURE — 99999 PR PBB SHADOW E&M-EST. PATIENT-LVL III: CPT | Mod: PBBFAC,,, | Performed by: NURSE PRACTITIONER

## 2021-12-01 PROCEDURE — 99214 PR OFFICE/OUTPT VISIT, EST, LEVL IV, 30-39 MIN: ICD-10-PCS | Mod: S$GLB,,, | Performed by: NURSE PRACTITIONER

## 2021-12-01 RX ORDER — GABAPENTIN 100 MG/1
100 CAPSULE ORAL SEE ADMIN INSTRUCTIONS
Qty: 90 CAPSULE | Refills: 3 | Status: SHIPPED | OUTPATIENT
Start: 2021-12-01 | End: 2022-01-20 | Stop reason: SDUPTHER

## 2021-12-16 ENCOUNTER — CLINICAL SUPPORT (OUTPATIENT)
Dept: REHABILITATION | Facility: HOSPITAL | Age: 71
End: 2021-12-16
Payer: MEDICARE

## 2021-12-16 DIAGNOSIS — Z78.9 IMPAIRED MOBILITY AND ADLS: ICD-10-CM

## 2021-12-16 DIAGNOSIS — R29.898 WEAKNESS OF FOOT, RIGHT: ICD-10-CM

## 2021-12-16 DIAGNOSIS — S06.5XAA SUBDURAL HEMATOMA: ICD-10-CM

## 2021-12-16 DIAGNOSIS — M62.81 MUSCLE WEAKNESS (GENERALIZED): ICD-10-CM

## 2021-12-16 DIAGNOSIS — Z74.09 IMPAIRED MOBILITY AND ADLS: ICD-10-CM

## 2021-12-16 PROCEDURE — 97161 PT EVAL LOW COMPLEX 20 MIN: CPT | Mod: PO

## 2021-12-20 PROBLEM — M62.81 MUSCLE WEAKNESS (GENERALIZED): Status: ACTIVE | Noted: 2021-12-20

## 2021-12-28 ENCOUNTER — CLINICAL SUPPORT (OUTPATIENT)
Dept: REHABILITATION | Facility: HOSPITAL | Age: 71
End: 2021-12-28
Payer: MEDICARE

## 2021-12-28 DIAGNOSIS — M62.81 MUSCLE WEAKNESS (GENERALIZED): ICD-10-CM

## 2021-12-28 PROCEDURE — 97112 NEUROMUSCULAR REEDUCATION: CPT | Mod: PO

## 2021-12-28 PROCEDURE — 97110 THERAPEUTIC EXERCISES: CPT | Mod: PO

## 2022-01-06 ENCOUNTER — DOCUMENTATION ONLY (OUTPATIENT)
Dept: REHABILITATION | Facility: HOSPITAL | Age: 72
End: 2022-01-06
Payer: MEDICARE

## 2022-01-06 ENCOUNTER — CLINICAL SUPPORT (OUTPATIENT)
Dept: REHABILITATION | Facility: HOSPITAL | Age: 72
End: 2022-01-06
Payer: MEDICARE

## 2022-01-06 DIAGNOSIS — M62.81 MUSCLE WEAKNESS (GENERALIZED): ICD-10-CM

## 2022-01-06 PROCEDURE — 97110 THERAPEUTIC EXERCISES: CPT | Mod: PO

## 2022-01-06 PROCEDURE — 97112 NEUROMUSCULAR REEDUCATION: CPT | Mod: PO

## 2022-01-06 NOTE — PROGRESS NOTES
PT/PTA met face to face to discuss pt's treatment plan and progress towards established goals.  Continue with current PT POC with focus on higher level balance.  Patient will be seen by physical therapist at least every sixth treatment or 30 days, whichever occurs first.    Rhoda Russell, PTA  01/06/2022

## 2022-01-06 NOTE — PROGRESS NOTES
[facility]   Physical Therapy Treatment Note     Name: Shirin [facility]  Clinic Number: [id number]    Therapy Diagnosis:   Encounter Diagnosis   Name Primary?    Muscle weakness (generalized)      Physician: Maribell Michaud MD    Visit Date: 1/6/2022       Physician Orders: PT Eval and Treat   Medical Diagnosis from Referral:   S06.5X9A (ICD-10-CM) - Subdural hematoma   R29.898 (ICD-10-CM) - Weakness of foot, right   Z74.09,Z78.9 (ICD-10-CM) - Impaired mobility and ADLs         Evaluation Date: 12/16/2021  Authorization Period Expiration: 12/3/2022  Plan of Care Expiration: 3/16/2022  Progress Note Due: 1/16/2021  Visit # / Visits authorized: 1/ 20( 3 total visits)   FOTO: 1/3  Precautions: Standard, Healing Craniotomy (6/4/2021)       PTA Visit #: 0/5     Time In: 1345  Time Out: 1430  Total Billable Time: 45 minutes    SUBJECTIVE     Pt reports: the upper trap stretch she learned last week has been very helpful. She continues to have R hand pain as described above and occasional headaches.   She was compliant with performance of upper trap stretch~ 4 additional exercises issued today.  Response to previous treatment: no adverse effects   Functional change: ongoing    Pain: 4-5/10  Location: right thumb and second 2 fingers    OBJECTIVE     Objective Measures updated at progress report unless specified.     Treatment     Shirin received the treatments listed below:      therapeutic exercises to develop strength, endurance, ROM, flexibility, posture and core stabilization for 37 minutes including:      Pt participates in 6 minute walk test as follows:      Gait Assessment:  - AD used: none  - Assistance: independent  - 2 Minute Walk Test Distance: 496 feet  - 6 Minute Walk Test Distance: 1456 feet  - O2 sats after: 90 %  - HR after: 89      Pt instructed in and performs the following exercises as additions to her HEP:  2 x 10 standing B LE hamstring curls  2 x 10 sit<> stand transitions  without arms  2 x 10 seated R ankle dorsiflexion against green theraband resistance  2 x 10 wall squats      X 7 minutes on SCI-FIT recumbent stepper at level 1.0 for improved B UE and LE muscular and CV endurance  SPO2 93 % and HR  115 after exercise        neuromuscular re-education activities to improve: Balance, Coordination, Kinesthetic, Sense, Proprioception and Posture for 8 minutes. The following activities were included:      Tandem walking 2 x 30 feet, CGA  Eyes closed walking 4 x 30 feet, CGA  Backward walking 4 x 30 feet, SBA    therapeutic activities to improve functional performance for 0  minutes, including:        Patient Education and Home Exercises     Home Exercises Provided and Patient Education Provided     Education provided:   - 12/28/21: PT provided B upper trap stretch to help pt manage L neck pain. Pt was also provided a schedule slip for the month of January.  -1/6/22: PT provided pt a schedule slip for the month of February.    Written Home Exercises Provided: yes. Exercises were reviewed and Shirin was able to demonstrate them prior to the end of the session.  Shirin demonstrated good  understanding of the education provided. See EMR under Patient Instructions for exercises provided during therapy sessions    ASSESSMENT     Shirin tolerated her therapy session very well today and was compliant with previously issued home exercise( upper trap stretch). PT issued 4 new exercises today and PT expects pt will perform these as directed. Pt continued endurance training on SCI-FIT recumbent stepper today and was able to perform for 7 minutes at level 1.0 resistance. 6 minute walk test completed and pt was able to travel 1456 feet during this time frame. PT feels pt can improve here so appropriate goals will be set. The remaining time was spent introducing higher level balance challenges; these will continue to be provided next week. Pt is appropriate for 1 x weekly physical therapy sessions to  "address her current deficits.    Shirin Is progressing well towards her goals.   Pt prognosis is Good.     Pt will continue to benefit from skilled outpatient physical therapy to address the deficits listed in the problem list box on initial evaluation, provide pt/family education and to maximize pt's level of independence in the home and community environment.     Pt's spiritual, cultural and educational needs considered and pt agreeable to plan of care and goals.     Anticipated barriers to physical therapy: Etiology of Symptoms, Scheduling    Goals:     Short Term Goals:  6 weeks  1. Pt. to demonstrate increased strength for right lower extremity to 5/5  to increase stability during community ambulation (progressing, not met)  2.  Pt to demonstrate increased strength for right upper extremity to 5/5 in order to perform functional activities (progressing, not met)  3. Pt. will improve Single Leg balance test score to >25 seconds in order to perform safe transfers and for patient to be in low/moderate risk for falls category (progressing, not met)  4. Pt will improve 6MWT endurance test score to ambulate safely in community~ Revise this goal to state " pt to perform 6 minute walk test to establish baseline level of activity tolerance," 12/28/21  5. Pt will initiate home exercise program to improve strength, flexibility, endurance, mobility & balance to return pt to PLOF (progressing, not met)  6.. Pt will tolerate 10 min or greater of time in light-->moderate intensity cardio (I.e. Bike, NuStep) to improve endurance to assist in community ambulation (progressing, not met)  7. Pt to demonstrate tandem stance 30 sec or greater w/out UE support in order to improve balance to progress to singe leg stance to decrease fall risk in performance of ADL's ~ delete this goal, 12/28/21  8. ( NEW GOAL, 12/28/21): pt to improve FGA score to 24/30 for improved community ambulation and decreased fall risk (progressing, not met)  9. " ( NEW GOAL, 12/28/21): pt to improve her score on condition 4 of the MEENA to 6 seconds for improved balance in tight spaces with  low/eliminated vision (progressing, not met)  10.( NEW GOAL, 1/6/22): pt to improve her 6 minute walk test distance to 4417-5350 feet to demonstrate increased activity tolerance( progressing, not met)       Long Term Goals:  8-12 weeks( established on 12/28/21)   1. Pt will improve Single Leg balance test score to 30 seconds or > in order to perform safe transfers and for patient to be in low/moderate risk for falls category (progressing, not met)  2. Pt will be independent in finalized HEP to help maintain potential gains in PT(progressing, not met)  3. Pt will improve FGA score to 26/30 for improved community ambulation and decreased fall risk (progressing, not met)  4. Pt will  tolerate 15 min or greater of time in light-->moderate intensity cardio (I.e. Bike, NuStep) to improve endurance to assist in community ambulation (progressing, not met)  5. Pt to improve her score on condition 4 of the MEENA to 9 seconds for improved balance in tight spaces with low/eliminated vision(progressing, not met)  6.( NEW GOAL, 1/6/22): pt to improve her 6 minute walk test distance to 1056-0296 feet to demonstrate increased activity tolerance( progressing, not met)          PLAN     Next visit: monitor pt's response to additional exercises for HEP. Progress pt with higher level balance and endurance activities.      Abraham Singh, PT   1/6/2022

## 2022-01-13 ENCOUNTER — CLINICAL SUPPORT (OUTPATIENT)
Dept: REHABILITATION | Facility: HOSPITAL | Age: 72
End: 2022-01-13
Payer: MEDICARE

## 2022-01-13 DIAGNOSIS — M62.81 MUSCLE WEAKNESS (GENERALIZED): ICD-10-CM

## 2022-01-13 PROCEDURE — 97110 THERAPEUTIC EXERCISES: CPT | Mod: PO,CQ

## 2022-01-13 PROCEDURE — 97112 NEUROMUSCULAR REEDUCATION: CPT | Mod: PO,CQ

## 2022-01-13 NOTE — PROGRESS NOTES
"  [facility]   Physical Therapy Treatment Note     Name: Shirin [facility]  Clinic Number: [id number]    Therapy Diagnosis:   Encounter Diagnosis   Name Primary?    Muscle weakness (generalized)      Physician: Maribell Michaud MD    Visit Date: 1/13/2022       Physician Orders: PT Eval and Treat   Medical Diagnosis from Referral:   S06.5X9A (ICD-10-CM) - Subdural hematoma   R29.898 (ICD-10-CM) - Weakness of foot, right   Z74.09,Z78.9 (ICD-10-CM) - Impaired mobility and ADLs         Evaluation Date: 12/16/2021  Authorization Period Expiration: 12/3/2022  Plan of Care Expiration: 3/16/2022  Progress Note Due: 1/16/2022  Visit # / Visits authorized: 2/ 20( 4 total visits)   FOTO: 1/3  Precautions: Standard, Healing Craniotomy (6/4/2021)       PTA Visit #: 1/5     Time In: 8:30  Time Out: 09:15  Total Billable Time: 45 minutes    SUBJECTIVE     Pt reports: " Coming here def helps me, I feel better after I come here."    She was compliant with performance of upper trap stretch~ 4 additional exercises issued today.  Response to previous treatment: no adverse effects   Functional change: ongoing    Pain: 4-5/10  Location: left neck, headache, right thumb and second 2 fingers    OBJECTIVE     Objective Measures updated at progress report unless specified.     Treatment     Shirin received the treatments listed below:      therapeutic exercises to develop strength, endurance, ROM, flexibility, posture and core stabilization for 10 minutes including:    X 10 minutes on Nu-Step recumbent stepper at level 2.0 for improved B UE and LE muscular and CV endurance      neuromuscular re-education activities to improve: Balance, Coordination, Kinesthetic, Sense, Proprioception and Posture for 35 minutes. The following activities were included:    // bars:  Airex foam pad: CGA   X 30 sec of static standing with WBOS and no UE support    2 x 30 sec of static standing with NBOS and no UE support   2 x 30 sec of " static standing with NBOS and eyes closed, no UE support   2 x 30 sec of static standing with head turns right to left, 1 UE support   2 x 30 sec of static standing with head nods up/down with 1 UE support   2 x 60 sec of B LE single leg hip flexion tapping the top of an orange cone, 1 UE support   2 x 60 sec each of B LE single leg hip flexion tapping the top of 3 orange cone without returning to midline with 1 UE support    Flat ground:  2 x 30 sec each of tandem stance, 1 UE support  X 4 laps of forward tandem ambulation, 1 UE support  X 4 laps of forward marching with 3 sec hold, occ 1 finger support  X 4 laps of grapevine ambulation, no UE support    therapeutic activities to improve functional performance for 0  minutes, including:        Patient Education and Home Exercises     Home Exercises Provided and Patient Education Provided     Education provided:   - 12/28/21: PT provided B upper trap stretch to help pt manage L neck pain. Pt was also provided a schedule slip for the month of January.  -1/6/22: PT provided pt a schedule slip for the month of February.    Written Home Exercises Provided: yes. Exercises were reviewed and Shirin was able to demonstrate them prior to the end of the session.  Shirin demonstrated good  understanding of the education provided. See EMR under Patient Instructions for exercises provided during therapy sessions    ASSESSMENT     Shirin tolerated her therapy session well and did not have any problems noted.  Shirin was able to increase her time on the stepper and requested on remain on it longer than 7 mins and focused on dynamic balance in the // bars.  Shirin required 1 UE support to occ 1 finger support or no support for balance activities.  No loss of balance noted.    Shirin Is progressing well towards her goals.   Pt prognosis is Good.     Pt will continue to benefit from skilled outpatient physical therapy to address the deficits listed in the problem list box on initial  "evaluation, provide pt/family education and to maximize pt's level of independence in the home and community environment.     Pt's spiritual, cultural and educational needs considered and pt agreeable to plan of care and goals.     Anticipated barriers to physical therapy: Etiology of Symptoms, Scheduling    Goals:     Short Term Goals:  6 weeks  1. Pt. to demonstrate increased strength for right lower extremity to 5/5  to increase stability during community ambulation (progressing, not met)  2.  Pt to demonstrate increased strength for right upper extremity to 5/5 in order to perform functional activities (progressing, not met)  3. Pt. will improve Single Leg balance test score to >25 seconds in order to perform safe transfers and for patient to be in low/moderate risk for falls category (progressing, not met)  4. Pt will improve 6MWT endurance test score to ambulate safely in community~ Revise this goal to state " pt to perform 6 minute walk test to establish baseline level of activity tolerance," 12/28/21  5. Pt will initiate home exercise program to improve strength, flexibility, endurance, mobility & balance to return pt to PLOF (progressing, not met)  6.. Pt will tolerate 10 min or greater of time in light-->moderate intensity cardio (I.e. Bike, NuStep) to improve endurance to assist in community ambulation (progressing, not met)  7. Pt to demonstrate tandem stance 30 sec or greater w/out UE support in order to improve balance to progress to singe leg stance to decrease fall risk in performance of ADL's ~ delete this goal, 12/28/21  8. ( NEW GOAL, 12/28/21): pt to improve FGA score to 24/30 for improved community ambulation and decreased fall risk (progressing, not met)  9. ( NEW GOAL, 12/28/21): pt to improve her score on condition 4 of the MEENA to 6 seconds for improved balance in tight spaces with  low/eliminated vision (progressing, not met)  10.( NEW GOAL, 1/6/22): pt to improve her 6 minute walk test " distance to 6509-1299 feet to demonstrate increased activity tolerance( progressing, not met)       Long Term Goals:  8-12 weeks( established on 12/28/21)   1. Pt will improve Single Leg balance test score to 30 seconds or > in order to perform safe transfers and for patient to be in low/moderate risk for falls category (progressing, not met)  2. Pt will be independent in finalized HEP to help maintain potential gains in PT(progressing, not met)  3. Pt will improve FGA score to 26/30 for improved community ambulation and decreased fall risk (progressing, not met)  4. Pt will  tolerate 15 min or greater of time in light-->moderate intensity cardio (I.e. Bike, NuStep) to improve endurance to assist in community ambulation (progressing, not met)  5. Pt to improve her score on condition 4 of the MEENA to 9 seconds for improved balance in tight spaces with low/eliminated vision(progressing, not met)  6.( NEW GOAL, 1/6/22): pt to improve her 6 minute walk test distance to 8468-1454 feet to demonstrate increased activity tolerance( progressing, not met)          PLAN     Next visit: monitor pt's response to additional exercises for HEP. Progress pt with higher level balance and endurance activities.      Rhoda Russell, PTA   1/13/2022

## 2022-01-18 ENCOUNTER — PATIENT MESSAGE (OUTPATIENT)
Dept: ADMINISTRATIVE | Facility: HOSPITAL | Age: 72
End: 2022-01-18
Payer: MEDICARE

## 2022-01-20 ENCOUNTER — TELEPHONE (OUTPATIENT)
Dept: SLEEP MEDICINE | Facility: CLINIC | Age: 72
End: 2022-01-20
Payer: MEDICARE

## 2022-01-20 ENCOUNTER — CLINICAL SUPPORT (OUTPATIENT)
Dept: REHABILITATION | Facility: HOSPITAL | Age: 72
End: 2022-01-20
Payer: MEDICARE

## 2022-01-20 DIAGNOSIS — M62.81 MUSCLE WEAKNESS (GENERALIZED): ICD-10-CM

## 2022-01-20 PROCEDURE — 97112 NEUROMUSCULAR REEDUCATION: CPT | Mod: PO,CQ

## 2022-01-20 PROCEDURE — 97110 THERAPEUTIC EXERCISES: CPT | Mod: PO,CQ

## 2022-01-20 RX ORDER — GABAPENTIN 100 MG/1
400 CAPSULE ORAL SEE ADMIN INSTRUCTIONS
Qty: 120 CAPSULE | Refills: 5 | Status: SHIPPED | OUTPATIENT
Start: 2022-01-20 | End: 2022-07-15

## 2022-01-20 NOTE — TELEPHONE ENCOUNTER
----- Message from Veronica Jurado sent at 1/20/2022  3:12 PM CST -----  Regarding: Medication  Name of Who is Calling:JONA KINCAID [[id number]]    What is the request in detail:Prescription renewal patient wanted advice on what she needed to do. Pt stated this is urgent.pt is req a call back.    Can the clinic reply by [facility]:No    What Number to Call Back if not in [facility]:[phone]

## 2022-01-20 NOTE — PROGRESS NOTES
"  [facility]   Physical Therapy Treatment Note     [facility]  Cl[facility] Number: [id number]    Therapy Diagnosis:   Encounter Diagnosis   Name Primary?    Muscle weakness (generalized)      Physician: Maribell Michaud MD    Visit Date: 1/20/2022       Physician Orders: PT Eval and Treat   Medical Diagnosis from Referral:   S06.5X9A (ICD-10-CM) - Subdural hematoma   R29.898 (ICD-10-CM) - Weakness of foot, right   Z74.09,Z78.9 (ICD-10-CM) - Impaired mobility and ADLs         Evaluation Date: 12/16/2021  Authorization Period Expiration: 12/3/2022  Plan of Care Expiration: 3/16/2022  Progress Note Due: 1/16/2022  Visit # / Visits authorized: 3/ 20( 5 total visits)   FOTO: 1/3  Precautions: Standard, Healing Craniotomy (6/4/2021)       PTA Visit #: 2/5     Time In: 10:05  Time Out: 10:45  Total Billable Time: 40 minutes    SUBJECTIVE     Pt reports: " I'm feeling better, the last few days it's def getting better."    She was compliant with HEP  Response to previous treatment: no adverse effects   Functional change: ongoing    Pain: 2/10  Location: left neck, headache, right thumb and second 2 fingers    OBJECTIVE     Objective Measures updated at progress report unless specified.     Treatment     Shirin received the treatments listed below:      therapeutic exercises to develop strength, endurance, ROM, flexibility, posture and core stabilization for 15 minutes including:    X 11 minutes on Sci fit recumbent stepper at level 3.0 for improved B UE and LE muscular and CV endurance    Leg press:  3 x 10 reps of squats on leg press with #60lbs applied.         neuromuscular re-education activities to improve: Balance, Coordination, Kinesthetic, Sense, Proprioception and Posture for 25 minutes. The following activities were included:    Near Ballet bar: CGA  2 Airex balance beams placed in front of each other   2 x 30 sec of BLE tandem stance, 1 finger support    2 x 30 sec of B LE single " leg stance, 1 finger support   X 6 laps of forward tandem ambulation, occ 1 finger support   X 6 laps of forward marching with 3 sec hold, occ 1 finger support    Pilates chair:  Standing hip flex/ext with 2 black and 1 white, B LE 2 x 10        therapeutic activities to improve functional performance for 0  minutes, including:        Patient Education and Home Exercises     Home Exercises Provided and Patient Education Provided     Education provided:   - 12/28/21: PT provided B upper trap stretch to help pt manage L neck pain. Pt was also provided a schedule slip for the month of January.  -1/6/22: PT provided pt a schedule slip for the month of February.    Written Home Exercises Provided: yes. Exercises were reviewed and Shirin was able to demonstrate them prior to the end of the session.  Shirin demonstrated good  understanding of the education provided. See EMR under Patient Instructions for exercises provided during therapy sessions    ASSESSMENT     Shirin tolerated her tx session well and did not have any problems noted.  Shirin arrived with decreased pain today and focused on dynamic balance on the 2 balance beams.   Shirin requires 1 finger support for most of the activities today and did not have any problems noted.  Cont with plan of care.    Shirin Is progressing well towards her goals.   Pt prognosis is Good.     Pt will continue to benefit from skilled outpatient physical therapy to address the deficits listed in the problem list box on initial evaluation, provide pt/family education and to maximize pt's level of independence in the home and community environment.     Pt's spiritual, cultural and educational needs considered and pt agreeable to plan of care and goals.     Anticipated barriers to physical therapy: Etiology of Symptoms, Scheduling    Goals:     Short Term Goals:  6 weeks  1. Pt. to demonstrate increased strength for right lower extremity to 5/5  to increase stability during community  "ambulation (progressing, not met)  2.  Pt to demonstrate increased strength for right upper extremity to 5/5 in order to perform functional activities (progressing, not met)  3. Pt. will improve Single Leg balance test score to >25 seconds in order to perform safe transfers and for patient to be in low/moderate risk for falls category (progressing, not met)  4. Pt will improve 6MWT endurance test score to ambulate safely in community~ Revise this goal to state " pt to perform 6 minute walk test to establish baseline level of activity tolerance," 12/28/21  5. Pt will initiate home exercise program to improve strength, flexibility, endurance, mobility & balance to return pt to PLOF (progressing, not met)  6.. Pt will tolerate 10 min or greater of time in light-->moderate intensity cardio (I.e. Bike, NuStep) to improve endurance to assist in community ambulation (progressing, not met)  7. Pt to demonstrate tandem stance 30 sec or greater w/out UE support in order to improve balance to progress to singe leg stance to decrease fall risk in performance of ADL's ~ delete this goal, 12/28/21  8. ( NEW GOAL, 12/28/21): pt to improve FGA score to 24/30 for improved community ambulation and decreased fall risk (progressing, not met)  9. ( NEW GOAL, 12/28/21): pt to improve her score on condition 4 of the MEENA to 6 seconds for improved balance in tight spaces with  low/eliminated vision (progressing, not met)  10.( NEW GOAL, 1/6/22): pt to improve her 6 minute walk test distance to 9193-3668 feet to demonstrate increased activity tolerance( progressing, not met)       Long Term Goals:  8-12 weeks( established on 12/28/21)   1. Pt will improve Single Leg balance test score to 30 seconds or > in order to perform safe transfers and for patient to be in low/moderate risk for falls category (progressing, not met)  2. Pt will be independent in finalized HEP to help maintain potential gains in PT(progressing, not met)  3. Pt will " improve FGA score to 26/30 for improved community ambulation and decreased fall risk (progressing, not met)  4. Pt will  tolerate 15 min or greater of time in light-->moderate intensity cardio (I.e. Bike, NuStep) to improve endurance to assist in community ambulation (progressing, not met)  5. Pt to improve her score on condition 4 of the MEENA to 9 seconds for improved balance in tight spaces with low/eliminated vision(progressing, not met)  6.( NEW GOAL, 1/6/22): pt to improve her 6 minute walk test distance to 7080-1173 feet to demonstrate increased activity tolerance( progressing, not met)          PLAN     Next visit: monitor pt's response to additional exercises for HEP. Progress pt with higher level balance and endurance activities.      Rhoda Russell, PTA   1/20/2022

## 2022-01-20 NOTE — TELEPHONE ENCOUNTER
Staff returned pt's call. She states that she is taking gabapentin 3 in the evening and 1 in the morning and she was informed by the pharmacist she will be out before she is due for a refill. She states she just can't go without this medication.

## 2022-01-25 ENCOUNTER — CLINICAL SUPPORT (OUTPATIENT)
Dept: REHABILITATION | Facility: HOSPITAL | Age: 72
End: 2022-01-25
Payer: MEDICARE

## 2022-01-25 DIAGNOSIS — M62.81 MUSCLE WEAKNESS (GENERALIZED): ICD-10-CM

## 2022-01-25 PROCEDURE — 97110 THERAPEUTIC EXERCISES: CPT | Mod: PO

## 2022-01-25 PROCEDURE — 97112 NEUROMUSCULAR REEDUCATION: CPT | Mod: PO

## 2022-01-25 NOTE — PROGRESS NOTES
[facility]   Physical Therapy Treatment Note     [facility]  Cl[facility] Number: [id number]    Therapy Diagnosis:   Encounter Diagnosis   Name Primary?    Muscle weakness (generalized)      Physician: Maribell Michaud MD    Visit Date: 1/25/2022       Physician Orders: PT Eval and Treat   Medical Diagnosis from Referral:   S06.5X9A (ICD-10-CM) - Subdural hematoma   R29.898 (ICD-10-CM) - Weakness of foot, right   Z74.09,Z78.9 (ICD-10-CM) - Impaired mobility and ADLs         Evaluation Date: 12/16/2021  Authorization Period Expiration: 12/3/2022  Plan of Care Expiration: 3/16/2022  Progress Note Due: 1/16/2021  Visit # / Visits authorized: 4/ 20( 6 total visits)   FOTO: 1/3  Precautions: Standard, Healing Craniotomy (6/4/2021)       PTA Visit #: 0/5     Time In: 1016  Time Out: 1100  Total Billable Time: 44  minutes    SUBJECTIVE     Pt reports: she feels better this week compared to last week   She was compliant with her home exercise program  Response to previous treatment: no adverse effects   Functional change: ongoing    Pain: 4-5/10, 3.5/10  Location: right thumb and second 2 fingers, L side of neck and occiput    OBJECTIVE     Objective Measures updated at progress report unless specified. See below.    Treatment     Shirin received the treatments listed below:      therapeutic exercises to develop strength, endurance, ROM, flexibility, posture and core stabilization for 12 minutes including:    Lower Extremity Strength  Right LE eval 1/25/22 Left LE eval 1/25/22   Hip flexion:  NT 4+/5 Hip flexion: NT 4/5   Knee extension: 4/5 5/5 Knee extension: 5/5 5/5   Knee flexion: 4/5 5/5 Knee flexion: 5/5 5/5   Ankle dorsiflexion:  4+/5 5/5 Ankle dorsiflexion: 5/5 5/5   Ankle plantarflexion:  NT 5/5 Ankle plantarflexion: NT 5/5   Hip abduction: NT 4+/5 Hip abduction: NT 4+/5   Hip adduction: NT 5/5 Hip adduction NT 5/5   Hip extension: NT 5/5 Hip extension: NT 5/5         1/6/22  Gait  Assessment:  - AD used: none  - Assistance: independent  - 2 Minute Walk Test Distance: 496 feet  - 6 Minute Walk Test Distance: 1456 feet  - O2 sats after: 90 %  - HR after: 89    1/25/22  Gait Assessment:  - AD used: none  - Assistance: independent  - 2 Minute Walk Test Distance: 513 feet  - 6 Minute Walk Test Distance: 1505 feet  - O2 sats after: 85 %  - HR after: 109          neuromuscular re-education activities to improve: Balance, Coordination, Kinesthetic, Sense, Proprioception and Posture for 32 minutes. The following activities were included:      Evaluation 12/28/22 1/25/22   Single Limb Stance R LE 20 seconds  (<10 sec = HIGH FALL RISK) 19 seconds  (<10 sec = HIGH FALL RISK) 30 seconds  (<10 sec = HIGH FALL RISK)   Single Limb Stance L LE 20 seconds  (<10 sec = HIGH FALL RISK) 25 seconds  (<10 sec = HIGH FALL RISK) 30 seconds  (<10 sec = HIGH FALL RISK)               Functional Gait Assessment:   1. Gait on level surface =  3~ 5 seconds to travel 6 m              (3) Normal: less than 5.5 sec, no A.D., no imbalance, normal gait pattern, deviates< 6in              (2) Mild impairment: 7-5.6 sec, uses A.D., mild gait deviations, or deviates 6-10 in              (1) Moderate impairment: > 7 sec, slow speed, imbalance, deviates 10-15 in.              (0) Severe impairment: needs assist, deviates >15 in, reach/touch wall  2. Change in Gait Speed = 3              (3) Normal: smooth change w/o loss of balance or gait deviation, deviates < 6 in, significant difference between speeds              (2) Mild impairment: changes speed, but demonstrates mild gait deviations, deviates 6-10 in, OR no deviations but unable to significantly speed, OR uses A.D.              (1) Moderate impairment: minor changes to speed, OR changes speed w/ significant deviations, deviates 10-15 in, OR  Changes speed , but loses balance & recovers              (0) Severe impairment: cannot change speed, deviates >15 in, or loses balance &  needs assist  3. Gait with horizontal head turns  = 3              (3) Normal: no change in gait, deviates <6 in              (2) Mild impairment: slight change in speed, deviates 6-10 in, OR uses A.D.              (1) Moderate impairment: moderate change in speed, deviates 10-15 in              (0) Severe impairment: severe disruption of gait, deviates >15in  4. Gait with vertical head turns = 3              (3) Normal: no change in gait, deviates <6 in              (2) Mild impairment: slight change in speed, deviates 6-10 in OR uses A.D.              (1) Moderate impairment: moderate change in speed, deviates 10-15 in              (0) Severe impairment: severe disruption of gait, deviates >15 in  5. Gait with pivot turns = 3              (3) Normal: performs safely in 3 sec, no LOB              (2) Mild impairment: performs in >3 sec & no LOB, OR turns safely & requires several steps to regain LOB              (1) Moderate impairment: turns slow, OR requires several small steps for balance following turn & stop              (0) Severe impairment: cannot turn safely, needs assist  6. Step over obstacle = 3              (3) Normal: steps over 2 stacked boxes w/o change in speed or LOB              (2) Mild impairment: able to step over 1 box w/o change in speed or LOB              (1) Moderate impairment: steps over 1 box but must slow down, may require VC              (0) Severe impairment: cannot perform w/o assist  7. Gait with Narrow ALISSA = 2              (3) Normal: 10 steps no staggering              (2) Mild impairment: 7-9 steps              (1) Moderate impairment: 4-7 steps              (0) Severe impairment: < 4 steps or cannot perform w/o assist  8. Gait with eyes closed = 2              (3) Normal: < 7 sec, no A.D., no LOB, normal gait pattern, deviates <6 in              (2) Mild impairment: 7.1-9 sec, mild gait deviations, deviates 6-10 in              (1) Moderate impairment: > 9 sec, abnormal  "pattern, LOB, deviates 10-15 in              (0) Severe impairment: cannot perform w/o assist, LOB, deviates >15in  9. Ambulating Backwards = 2              (3) Normal: no A.D., no LOB, normal gait pattern, deviates <6in              (2) Mild impairment: uses A.D., slower speed, mild gait deviations, deviates 6-10 in              (1) Moderate impairment: slow speed, abnormal gait pattern, LOB, deviates 10-15 in              (0) Severe impairment: severe gait deviations or LOB, deviates >15in  10. Steps = 2              (3) Normal: alternating feet, no rail              (2) Mild Impairment: alternating feet, uses rail              (1) Moderate impairment: step-to, uses rail              (0) Severe impairment: cannot perform safely     Score 22/30 on 12/28/21; 26/30 on 1/25/22     Score:   <22/30 fall risk   <20/30 fall risk in older adults   <18/30 fall risk in Parkinsons       12/28/21  Postural control:  MEENA SENSORY TESTING:  (P= Pass, F= Fail; note any sway; hold each position for 30")  Condition 1: (firm surface/feet together/eyes open) P  Condition 2: (firm surface/feet together/eyes closed) P  Condition 3: (firm surface/feet in tandem/eyes open) P  Condition 4: (firm surface/feet in tandem/eyes closed) F, 3"  Condition 5: (soft surface/feet together/eyes open) P  Condition 6: (soft surface/feet together/eyes closed) P~ mild sway  Condition 7: (Fakuda step test), measure distance varied from center starting position NT       1/25/22  Postural control:  MEENA SENSORY TESTING:  (P= Pass, F= Fail; note any sway; hold each position for 30")  Condition 1: (firm surface/feet together/eyes open) P  Condition 2: (firm surface/feet together/eyes closed) P  Condition 3: (firm surface/feet in tandem/eyes open) P~ slight sway  Condition 4: (firm surface/feet in tandem/eyes closed) F, 10"  Condition 5: (soft surface/feet together/eyes open) P  Condition 6: (soft surface/feet together/eyes closed) P~ mild sway  Condition 7: " (Fakuda step test), measure distance varied from center starting position NT       Evaluation 1/25/22   Timed Up and Go 9 sec  < 20 sec safe for independent transfers, < 30 sec safe for dependent transfers/assist required 8 sec  < 20 sec safe for independent transfers, < 30 sec safe for dependent transfers/assist required         Table: Population Norms for TUG    Age  Average TUG    60 - 69 years  8.1 seconds    70 - 79 years  9.2 seconds    80 - 99 years  11.3 seconds       Endurance Assessment:    Evaluation 1/25/22   30 second Chair Rise  (adults > 61 y/o) 10 completed with no arms 13 completed with no arms           therapeutic activities to improve functional performance for 0  minutes, including:        Patient Education and Home Exercises     Home Exercises Provided and Patient Education Provided     Education provided:   - 12/28/21: PT provided B upper trap stretch to help pt manage L neck pain. Pt was also provided a schedule slip for the month of January.  -1/6/22: PT provided pt a schedule slip for the month of February.    Written Home Exercises Provided: yes. Exercises were reviewed and Shirin was able to demonstrate them prior to the end of the session.  Shirin demonstrated good  understanding of the education provided. See EMR under Patient Instructions for exercises provided during therapy sessions    ASSESSMENT     Shirin tolerated her therapy session very well today for her 30 day reassessment. This comes a bit late due to pt transferring from ortho team to neuro team and this therapist was out on vacation for the week of 1/10-1/14. As expected, pt performed extremely well and demonstrated improvements in her objective testing in all areas. Pt's LE strength was reassessed and PT included hip strength testing( which was not performed at time of evaluation). Pt demonstrated increased scores for R knee flexion, extension and ankle dorsiflexion. Pt also demonstrated improvement with her 30 second chair  "rise, B SLS, MEENA Sensory Test condition # 4, 6 minute walk distance and FGA test. Specifically, pt's score of 26/30 on the FGA places pt well outside the fall risk window. PT is pleased with pt's progress and will look to add to her HEP at next session. Pt remains appropriate for 1 x weekly physical therapy sessions to address her current deficits.    Shirin Is progressing well towards her goals.   Pt prognosis is Good.     Pt will continue to benefit from skilled outpatient physical therapy to address the deficits listed in the problem list box on initial evaluation, provide pt/family education and to maximize pt's level of independence in the home and community environment.     Pt's spiritual, cultural and educational needs considered and pt agreeable to plan of care and goals.     Anticipated barriers to physical therapy: Etiology of Symptoms, Scheduling    Goals:     Short Term Goals:  6 weeks  1. Pt. to demonstrate increased strength for right lower extremity to 5/5  to increase stability during community ambulation (progressing, not met)  2.  Pt to demonstrate increased strength for right upper extremity to 5/5 in order to perform functional activities (progressing, not met)  3. Pt. will improve Single Leg balance test score to >25 seconds in order to perform safe transfers and for patient to be in low/moderate risk for falls category (MET, 1/25/22)  4. Pt will improve 6MWT endurance test score to ambulate safely in community~ Revise this goal to state " pt to perform 6 minute walk test to establish baseline level of activity tolerance," 12/28/21  5. Pt will initiate home exercise program to improve strength, flexibility, endurance, mobility & balance to return pt to PLOF (MET, 1/25/22)  6.. Pt will tolerate 10 min or greater of time in light-->moderate intensity cardio (I.e. Bike, NuStep) to improve endurance to assist in community ambulation (progressing, not met)  7. Pt to demonstrate tandem stance 30 sec " or greater w/out UE support in order to improve balance to progress to singe leg stance to decrease fall risk in performance of ADL's ~ delete this goal, 12/28/21  8. ( NEW GOAL, 12/28/21): pt to improve FGA score to 24/30 for improved community ambulation and decreased fall risk (MET, 1/25/22)  9. ( NEW GOAL, 12/28/21): pt to improve her score on condition 4 of the MEENA to 6 seconds for improved balance in tight spaces with  low/eliminated vision (MET, 1/25/22)  10.( NEW GOAL, 1/6/22): pt to improve her 6 minute walk test distance to 2873-3059 feet to demonstrate increased activity tolerance( MET, 1/25/22)       Long Term Goals:  8-12 weeks( established on 12/28/21)   1. Pt will improve Single Leg balance test score to 30 seconds or > in order to perform safe transfers and for patient to be in low/moderate risk for falls category (MET, 1/25/22)  2. Pt will be independent in finalized HEP to help maintain potential gains in PT(progressing, not met)  3. Pt will improve FGA score to 26/30 for improved community ambulation and decreased fall risk (MET, 1/25/22)  4. Pt will  tolerate 15 min or greater of time in light-->moderate intensity cardio (I.e. Bike, NuStep) to improve endurance to assist in community ambulation (progressing, not met)  5. Pt to improve her score on condition 4 of the MEENA to 9 seconds for improved balance in tight spaces with low/eliminated vision(MET, 1/25/22)  6.( NEW GOAL, 1/6/22): pt to improve her 6 minute walk test distance to 0839-9063 feet to demonstrate increased activity tolerance( progressing, not met)          PLAN     Next visit: consider updates to HEP    Continue to progress pt with higher level balance, strengthening and endurance activities.      Abraham Singh, PT   1/25/2022

## 2022-01-26 ENCOUNTER — PROCEDURE VISIT (OUTPATIENT)
Dept: PHYSICAL MEDICINE AND REHAB | Facility: CLINIC | Age: 72
End: 2022-01-26
Payer: MEDICARE

## 2022-01-26 DIAGNOSIS — R20.2 PARESTHESIA OF BOTH HANDS: ICD-10-CM

## 2022-01-26 PROCEDURE — 95885 MUSC TST DONE W/NERV TST LIM: CPT | Mod: 59,S$GLB,, | Performed by: PHYSICAL MEDICINE & REHABILITATION

## 2022-01-26 PROCEDURE — 95886 PR EMG COMPLETE, W/ NERVE CONDUCTION STUDIES, 5+ MUSCLES: ICD-10-PCS | Mod: S$GLB,,, | Performed by: PHYSICAL MEDICINE & REHABILITATION

## 2022-01-26 PROCEDURE — 95885 PR MUSC TST DONE W/NERV TST LIM: ICD-10-PCS | Mod: 59,S$GLB,, | Performed by: PHYSICAL MEDICINE & REHABILITATION

## 2022-01-26 PROCEDURE — 95910 NRV CNDJ TEST 7-8 STUDIES: CPT | Mod: S$GLB,,, | Performed by: PHYSICAL MEDICINE & REHABILITATION

## 2022-01-26 PROCEDURE — 95910 PR NERVE CONDUCTION STUDY; 7-8 STUDIES: ICD-10-PCS | Mod: S$GLB,,, | Performed by: PHYSICAL MEDICINE & REHABILITATION

## 2022-01-26 PROCEDURE — 95886 MUSC TEST DONE W/N TEST COMP: CPT | Mod: S$GLB,,, | Performed by: PHYSICAL MEDICINE & REHABILITATION

## 2022-01-26 NOTE — PROCEDURES
Test Date:  2022    Patient: Shirin Evans : 1950 Physician: Rickey Mendoza D.O.   ID#:  SEX: Female Ref. Phys: Maribell Michaud MD     HPI: Shirin Evans is a 71 y.o.female who presents for NCS/EMG to evaluate CTS on the right.      NCV & EMG Findings:   Evaluation of the left median motor nerve showed prolonged distal onset latency.   The right median motor nerve showed prolonged distal onset latency and reduced amplitude.   The left median sensory nerve showed prolonged distal peak latency and decreased conduction velocity.   The right median sensory nerve showed no response.   All remaining nerves (as indicated in the following tables) were within normal limits.   Needle evaluation of the right Abductor Pollicis Brevis muscle showed increased insertional activity, moderately increased spontaneous activity, and increased motor unit amplitude.   The left Abductor Pollicis Brevis muscle showed increased motor unit amplitude and diminished recruitment.   All remaining muscles (as indicated in the following table) showed no evidence of electrical instability.    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 Carpal tunnel syndrome).  There is no motor axonal loss.  There is a conduction block across the right wrist.  There is active denervation on the right.  This is graded as Severe in severity on the right, and Moderate on the left.          ___________________________  Rickey Mendoza D.O.        NCS+  Motor Nerve Results      Latency Amplitude F-Lat Segment Distance CV Comment   Site (ms) Norm (mV) Norm (ms)  (cm) (m/s) Norm    Left Median (APB)   Wrist *5.2  < 4.4 4.8  > 3.8  Wrist-Palm - - -    Elbow 9.0 - 4.3 -  Elbow-Wrist 20 53  > 51    Right Median (APB)   Palm 1.48 - 4.6 -         Wrist *7.4  < 4.4 *2.8  > 3.8  Wrist-Palm 7 12 -    Elbow 11.5 - 2.8 -  Elbow-Wrist 21 51  > 51    Left  Ulnar (ADM)   Wrist 3.3  < 3.7 9.4  > 3.0         Bel Elbow 6.5 - 6.9 -  Bel Elbow-Wrist 20 63  > 52    Abv Elbow 7.8 - 8.5 -  Abv Elbow-Bel Elbow 11 85  > 43    Right Ulnar (ADM)   Wrist 3.5  < 3.7 9.5  > 3.0         Bel Elbow 6.7 - 9.4 -  Bel Elbow-Wrist 21 66  > 52    Abv Elbow 8.5 - 9.2 -  Abv Elbow-Bel Elbow 10 56  > 43      Sensory Nerve Results      Latency (Peak) Amplitude (P-P) Segment Distance CV Comment   Site (ms) Norm (µV) Norm  (cm) (m/s) Norm    Left Median (Stim:Wrist)   Dig II *5.7  < 4.0 17  > 8 Wrist-Dig II 14 *25  > 39    Right Median (Stim:Wrist)   Dig II *NR  < 4.0 *NR  > 8 Wrist-Dig II 14 *NR  > 39    Left Ulnar (Stim:Wrist)   Dig V 3.8  < 4.0 73  > 4 Wrist-Dig V 16 42  > 38    Right Ulnar (Stim:Wrist)   Dig V 3.9  < 4.0 70  > 4 Wrist-Dig V 15.5 40  > 38      EMG+     Side Muscle Nerve Root Ins Act Fibs Psw Amp Dur Poly Recrt Int Pat Comment   Right Deltoid Axillary C5-C6 Nml Nml Nml Nml Nml 0 Nml Nml    Right Triceps Radial C6-C8 Nml Nml Nml Nml Nml 0 Nml Nml    Right Pronator Teres Median C6-C7 Nml Nml Nml Nml Nml 0 Nml Nml    Right EDC Radial,  Posterior In... C7-C8 Nml Nml Nml Nml Nml 0 Nml Nml    Right APB Median C8-T1 *Incr *2+ *2+ *Incr Nml 0 Nml Nml    Left APB Median C8-T1 Nml Nml Nml *Incr Nml 0 *Reduced Nml            Waveforms:    Motor               Sensory

## 2022-02-01 ENCOUNTER — CLINICAL SUPPORT (OUTPATIENT)
Dept: REHABILITATION | Facility: HOSPITAL | Age: 72
End: 2022-02-01
Payer: MEDICARE

## 2022-02-01 DIAGNOSIS — M62.81 MUSCLE WEAKNESS (GENERALIZED): ICD-10-CM

## 2022-02-01 PROCEDURE — 97110 THERAPEUTIC EXERCISES: CPT | Mod: PO

## 2022-02-01 PROCEDURE — 97112 NEUROMUSCULAR REEDUCATION: CPT | Mod: PO

## 2022-02-01 NOTE — PROGRESS NOTES
"[facility]   Physical Therapy Treatment Note     [facility]  Cl[facility] Number: [id number]    Therapy Diagnosis:   Encounter Diagnosis   Name Primary?    Muscle weakness (generalized)      Physician: Maribell Michaud MD    Visit Date: 2/1/2022       Physician Orders: PT Eval and Treat   Medical Diagnosis from Referral:   S06.5X9A (ICD-10-CM) - Subdural hematoma   R29.898 (ICD-10-CM) - Weakness of foot, right   Z74.09,Z78.9 (ICD-10-CM) - Impaired mobility and ADLs         Evaluation Date: 12/16/2021  Authorization Period Expiration: 12/3/2022  Plan of Care Expiration: 3/16/2022  Progress Note Due: 1/16/2021  Visit # / Visits authorized: 5/ 20( 7 total visits)   FOTO: 1/3  Precautions: Standard, Healing Craniotomy (6/4/2021)       PTA Visit #: 0/5     Time In: 1100  Time Out: 1145   Total Billable Time: 45   minutes    SUBJECTIVE     Pt reports: "nothing to worry or complain about." Pt reports that one day last week she had a day with no pain.   She was compliant with her home exercise program. 2 new exercises added today.  Response to previous treatment: no adverse effects   Functional change: ongoing    Pain: 4-10  Location: right thumb and second 2 fingers, L side of neck and occiput    OBJECTIVE     Objective Measures updated at progress report unless specified. See below.    Treatment     Shirin received the treatments listed below:      therapeutic exercises to develop strength, endurance, ROM, flexibility, posture and core stabilization for 25 minutes including:    X 10 minutes on SCI-FIT recumbent stepper at level 3.0 for improved B UE and LE muscular and CV endurance  Sp02 97 % and HR 90 after exercise     Leg press:  2 x 10 reps of squats on leg press with # 60  applied.     The below exercises are provided as updates to HEP:    2 x 10 B SLR with 2 " hold time  2 x 10 B side lying hip abduction with 2 " hold time       neuromuscular re-education activities to improve: " Balance, Coordination, Kinesthetic, Sense, Proprioception and Posture for 20 minutes. The following activities were included:        Outside parallel bars:    Tandem walking 3 x 30 feet, CGA  Eyes closed walking 3 x 30 feet, CGA  Backward walking 3 x 30 feet, SBA    1 x 10 B LE forward/backward step ups/downs on soft side of large Bosu, no UE support, CGA to slight min A  1 x 10 alternate single limb step overs on soft side of large Bosu, no UE support, min A    Pilates chair:  1 x 10 B LE hip and knee flex/ext, working foot pad up and down, 2 black springs and 1 white spring, no UE support, CGA to slight min A     Propelling scooter:  2 x 100 feet with L foot on scooter and R foot propelling, min A  2 x 100 feet with R foot on scooter and L foot propelling, min A    therapeutic activities to improve functional performance for 0  minutes, including:        Patient Education and Home Exercises     Home Exercises Provided and Patient Education Provided     Education provided:   - 12/28/21: PT provided B upper trap stretch to help pt manage L neck pain. Pt was also provided a schedule slip for the month of January.  -1/6/22: PT provided pt a schedule slip for the month of February.    Written Home Exercises Provided: yes. Exercises were reviewed and Shirin was able to demonstrate them prior to the end of the session.  Shirin demonstrated good  understanding of the education provided. See EMR under Patient Instructions for exercises provided during therapy sessions    ASSESSMENT     Shirin tolerated her therapy session very well today and was issued 2 new exercises for home. Pt performed these well and should remain complaint with these and previously issued exercises. PT resumed work on the SCI-FIT recumbent stepper and leg press for muscular and CV strengthening and endurance. PT seemed to do very well on the stepper, generating nice, forceful strides the entire 10 minutes. New balance challenges offered today include  "step ups on Bosu and propelling the scooter in the hallway. Pt was challenged by work on the scooter as well as modified SLS work at the [employer]. Pt remains appropriate for 1 x weekly physical therapy sessions to address her current deficits.    Shirin Is progressing well towards her goals.   Pt prognosis is Good.     Pt will continue to benefit from skilled outpatient physical therapy to address the deficits listed in the problem list box on initial evaluation, provide pt/family education and to maximize pt's level of independence in the home and community environment.     Pt's spiritual, cultural and educational needs considered and pt agreeable to plan of care and goals.     Anticipated barriers to physical therapy: Etiology of Symptoms, Scheduling    Goals:     Short Term Goals:  6 weeks  1. Pt. to demonstrate increased strength for right lower extremity to 5/5  to increase stability during community ambulation (progressing, not met)  2.  Pt to demonstrate increased strength for right upper extremity to 5/5 in order to perform functional activities (progressing, not met)  3. Pt. will improve Single Leg balance test score to >25 seconds in order to perform safe transfers and for patient to be in low/moderate risk for falls category (MET, 1/25/22)  4. Pt will improve 6MWT endurance test score to ambulate safely in community~ Revise this goal to state " pt to perform 6 minute walk test to establish baseline level of activity tolerance," 12/28/21  5. Pt will initiate home exercise program to improve strength, flexibility, endurance, mobility & balance to return pt to PLOF (MET, 1/25/22)  6.. Pt will tolerate 10 min or greater of time in light-->moderate intensity cardio (I.e. Bike, NuStep) to improve endurance to assist in community ambulation (progressing, not met)  7. Pt to demonstrate tandem stance 30 sec or greater w/out UE support in order to improve balance to progress to singe leg stance to decrease fall " risk in performance of ADL's ~ delete this goal, 12/28/21  8. ( NEW GOAL, 12/28/21): pt to improve FGA score to 24/30 for improved community ambulation and decreased fall risk (MET, 1/25/22)  9. ( NEW GOAL, 12/28/21): pt to improve her score on condition 4 of the MEENA to 6 seconds for improved balance in tight spaces with  low/eliminated vision (MET, 1/25/22)  10.( NEW GOAL, 1/6/22): pt to improve her 6 minute walk test distance to 3619-4711 feet to demonstrate increased activity tolerance( MET, 1/25/22)       Long Term Goals:  8-12 weeks( established on 12/28/21)   1. Pt will improve Single Leg balance test score to 30 seconds or > in order to perform safe transfers and for patient to be in low/moderate risk for falls category (MET, 1/25/22)  2. Pt will be independent in finalized HEP to help maintain potential gains in PT(progressing, not met)  3. Pt will improve FGA score to 26/30 for improved community ambulation and decreased fall risk (MET, 1/25/22)  4. Pt will  tolerate 15 min or greater of time in light-->moderate intensity cardio (I.e. Bike, NuStep) to improve endurance to assist in community ambulation (progressing, not met)  5. Pt to improve her score on condition 4 of the MEENA to 9 seconds for improved balance in tight spaces with low/eliminated vision(MET, 1/25/22)  6.( NEW GOAL, 1/6/22): pt to improve her 6 minute walk test distance to 4872-5415 feet to demonstrate increased activity tolerance( progressing, not met)          PLAN     Continue to progress pt with higher level balance, strengthening and endurance activities.      Abraham Singh, PT   2/1/2022

## 2022-02-03 ENCOUNTER — OFFICE VISIT (OUTPATIENT)
Dept: NEUROSURGERY | Facility: CLINIC | Age: 72
End: 2022-02-03
Payer: MEDICARE

## 2022-02-03 VITALS
SYSTOLIC BLOOD PRESSURE: 109 MMHG | BODY MASS INDEX: 27.96 KG/M2 | HEART RATE: 74 BPM | HEIGHT: 63 IN | DIASTOLIC BLOOD PRESSURE: 74 MMHG | TEMPERATURE: 99 F

## 2022-02-03 DIAGNOSIS — G56.03 CARPAL TUNNEL SYNDROME, BILATERAL: ICD-10-CM

## 2022-02-03 PROCEDURE — 3288F PR FALLS RISK ASSESSMENT DOCUMENTED: ICD-10-PCS | Mod: CPTII,S$GLB,, | Performed by: NEUROLOGICAL SURGERY

## 2022-02-03 PROCEDURE — 1125F AMNT PAIN NOTED PAIN PRSNT: CPT | Mod: CPTII,S$GLB,, | Performed by: NEUROLOGICAL SURGERY

## 2022-02-03 PROCEDURE — 3078F DIAST BP <80 MM HG: CPT | Mod: CPTII,S$GLB,, | Performed by: NEUROLOGICAL SURGERY

## 2022-02-03 PROCEDURE — 1101F PT FALLS ASSESS-DOCD LE1/YR: CPT | Mod: CPTII,S$GLB,, | Performed by: NEUROLOGICAL SURGERY

## 2022-02-03 PROCEDURE — 3008F PR BODY MASS INDEX (BMI) DOCUMENTED: ICD-10-PCS | Mod: CPTII,S$GLB,, | Performed by: NEUROLOGICAL SURGERY

## 2022-02-03 PROCEDURE — 3074F SYST BP LT 130 MM HG: CPT | Mod: CPTII,S$GLB,, | Performed by: NEUROLOGICAL SURGERY

## 2022-02-03 PROCEDURE — 99214 PR OFFICE/OUTPT VISIT, EST, LEVL IV, 30-39 MIN: ICD-10-PCS | Mod: S$GLB,,, | Performed by: NEUROLOGICAL SURGERY

## 2022-02-03 PROCEDURE — 99214 OFFICE O/P EST MOD 30 MIN: CPT | Mod: S$GLB,,, | Performed by: NEUROLOGICAL SURGERY

## 2022-02-03 PROCEDURE — 99999 PR PBB SHADOW E&M-EST. PATIENT-LVL III: ICD-10-PCS | Mod: PBBFAC,,, | Performed by: NEUROLOGICAL SURGERY

## 2022-02-03 PROCEDURE — 3074F PR MOST RECENT SYSTOLIC BLOOD PRESSURE < 130 MM HG: ICD-10-PCS | Mod: CPTII,S$GLB,, | Performed by: NEUROLOGICAL SURGERY

## 2022-02-03 PROCEDURE — 99999 PR PBB SHADOW E&M-EST. PATIENT-LVL III: CPT | Mod: PBBFAC,,, | Performed by: NEUROLOGICAL SURGERY

## 2022-02-03 PROCEDURE — 1125F PR PAIN SEVERITY QUANTIFIED, PAIN PRESENT: ICD-10-PCS | Mod: CPTII,S$GLB,, | Performed by: NEUROLOGICAL SURGERY

## 2022-02-03 PROCEDURE — 1101F PR PT FALLS ASSESS DOC 0-1 FALLS W/OUT INJ PAST YR: ICD-10-PCS | Mod: CPTII,S$GLB,, | Performed by: NEUROLOGICAL SURGERY

## 2022-02-03 PROCEDURE — 3008F BODY MASS INDEX DOCD: CPT | Mod: CPTII,S$GLB,, | Performed by: NEUROLOGICAL SURGERY

## 2022-02-03 PROCEDURE — 3288F FALL RISK ASSESSMENT DOCD: CPT | Mod: CPTII,S$GLB,, | Performed by: NEUROLOGICAL SURGERY

## 2022-02-03 PROCEDURE — 3078F PR MOST RECENT DIASTOLIC BLOOD PRESSURE < 80 MM HG: ICD-10-PCS | Mod: CPTII,S$GLB,, | Performed by: NEUROLOGICAL SURGERY

## 2022-02-03 NOTE — PROGRESS NOTES
Neurosurgery  Follow up    SUBJECTIVE:     History of Present Illness:  Shirin Evans is a 70 y.o. female who presents as a transfer from [facility] after MRI brain revealed bilateral mixed density subdural hematomas. Ms. Evans hit her head in May; initial CT head was negative, but balance difficulties and some problems with the right leg led her to seek neurologic consultation yesterday. MRI revealed the collections. She has previous history of PE but has been off Eliquis since November. No AP agents.    She underwent bilateral craniotomies for evacuation of subdural hematoma on 6/4/21, followed by left MMA embolization on 6/6/21. As of 7/27/21, the patient reports that she has been well, though she has some tingling in her right hand sporadically every few days. She has some occasional lethargy, headache, disorientation. These episodes occur about once every 10 days.     No fever, chills, or drainage from the incisions. No seizures. She does have daily headaches, mostly in the back of the head but sometimes in the temple. She takes Tylenol for this.     As of 9/23/21, the patient reports that she has been doing well. She is still having headaches, which still respond to Tylenol. These are intermittent with no particular inciting activity or time of day. She reports that she is being hypervigilant to report all her symptoms and she is not particularly bothered by these. She has some tingling/paresthesias in her right hand. The numbness in her scalp is improving. She has no balance difficulties and is walking well.     She walks 3 days a week when her caregiver comes in. She continues to do home PT exercises on her own. She is interested in walking more independently. Her daughter notes that she sometimes forgets her phone, which makes them nervous.     As of 11/16/21, the patient reports that she has been fairly well. She has had some bad days though, including tingling and numbness in both hands (more at  paresthesias) in a median nerve distribution. She denies any  weakness or fine motor movement changes. She has had no handwriting changes. She notices this more at night when she is at home.  She still has headaches, which respond to Tylenol.     She has been continuing the HHPT exercises at home, but she has not been to PT on an outpatient basis. She feels best when she is walking around outside.     As of 2/3/22, the patient returns in follow up after having completed her EMG-NCS, which revealed bilateral carpal tunnel syndrome, severe on the right and moderate on the left. Otherwise, she is doing well. She is really enjoying PT and has made significant progress with respect to stability. She presents today with Angelica.        Review of patient's allergies indicates:  No Known Allergies    Current Outpatient Medications   Medication Sig Dispense Refill    acetaminophen (TYLENOL) 325 MG tablet Take 2 tablets (650 mg total) by mouth every 4 (four) hours as needed.  0    calcium carbonate (OS-SANDER) 500 mg calcium (1,250 mg) chewable tablet       cyanocobalamin (VITAMIN B-12) 1000 MCG tablet Take 1 tablet (1,000 mcg total) by mouth once daily. (Patient not taking: Reported on 12/1/2021) 30 tablet 11    ergocalciferol, vitamin D2, (VITAMIN D ORAL)       ferrous sulfate (FEOSOL) 325 mg (65 mg iron) Tab tablet Take 1 tablet (325 mg total) by mouth every other day. 45 tablet 3    gabapentin (NEURONTIN) 100 MG capsule Take 4 capsules (400 mg total) by mouth As instructed (head pain). 120 capsule 5    rosuvastatin (CRESTOR) 20 MG tablet TAKE 1 TABLET(20 MG) BY MOUTH DAILY 90 tablet 3     No current facility-administered medications for this visit.       Past Medical History:   Diagnosis Date    Headache     Hypertension     Movement disorder     Pulmonary embolism     Syncope and collapse     Weakness of right lower extremity 6/3/2021     Past Surgical History:   Procedure Laterality Date    CEREBRAL  ANGIOGRAM N/A 6/6/2021    Procedure: ANGIOGRAM-CEREBRAL; bilateral MMA embolization;  Surgeon: Ramon Che MD;  [facility];  Service: Neurosurgery;  Laterality: N/A;    CRANIOTOMY FOR EVACUATION OF SUBDURAL HEMATOMA Bilateral 6/4/2021    Procedure: CRANIOTOMY, FOR SUBDURAL HEMATOMA EVACUATION;  Surgeon: Maribell Michaud MD;  Location: [facility] OR [facility]ND [facility];  Service: Neurosurgery;  Laterality: Bilateral;     Family History     Problem Relation (Age of Onset)    Atrial fibrillation Father    Cancer Father    Deep vein thrombosis Sister    Depression Mother    Heart attacks under age 50 Mother (48)    Heart disease Father    Hypertension Father    Stomach cancer Maternal Grandmother        Social History     Socioeconomic History    Marital status: Single   Occupational History    Occupation: Retired    Tobacco Use    Smoking status: Never Smoker    Smokeless tobacco: Never Used   Substance and Sexual Activity    Alcohol use: Not Currently    Drug use: Not Currently    Sexual activity: Not Currently       Review of Systems  Constitutional: Denies fevers, weight loss, chills, or weakness.  Eyes: Denies changes in vision.  ENT: Denies dysphagia, nasal discharge, ear pain or discharge.  Cardiovascular: Denies chest pain, palpitations, orthopnea, or claudication.  Respiratory: Denies shortness of breath, cough, hemoptysis, or wheezing.  GI: Denies nausea/vomitting, hematochezia, melena, abd pain, or changes in appetite.  : Denies dysuria, incontinence, or hematuria.  Musculoskeletal: Denies joint pain or myalgias.   Skin/breast: Denies rashes, lumps, lesions, or discharge.  Neurologic: Denies seizure   Psychiatric: Denies changes in mood or hallucinations.  Endocrine: Denies polyuria, polydipsia, heat/cold intolerance.  Hematologic/Lymph: Denies lymphadenopathy, easy bruising or easy bleeding.  Allergic/Immunologic: Denies rash, rhinitis.    OBJECTIVE:     Vital Signs     There is no  height or weight on file to calculate BMI.      Physical Exam:    Constitutional: She appears well-developed and well-nourished. No distress.     Eyes: EOM are normal.     Cardiovascular: No edema.     Abdominal: Soft.     Skin:   Incisions healing appropriately      Psych/Behavior: She is alert. She is oriented to person, place, and time.     Musculoskeletal:        Left Upper Extremities: Muscle strength is 5/5.       Right Lower Extremities: Muscle strength is 5/5.        Left Lower Extremities: Muscle strength is 5/5.      Comments: 4+/5 right hand       Neurological:        Coordination: She has normal finger to nose coordination.        DTRs: DTRs are DTRS NORMAL AND SYMMETRICnormal and symmetric.     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 Carpal tunnel syndrome).  There is no motor axonal loss.  There is a conduction block across the right wrist.  There is active denervation on the right.  This is graded as Severe in severity on the right, and Moderate on the left.            ___________________________  Rickey Mendoza D.O.    ASSESSMENT/PLAN:     Shirin Evans is a 71 y.o. female s/p bilateral craniotomies for subdural hematoma on 6/4/21 who presents in postoperative follow-up. She has had good radiographic resolution.     She continues to progress with respect to recovery. She is excelling with outpatient PT.     The EMG-NCS is consistent with severe right and moderate left CTS. She does have a noticeable change in strength in the right hand since my last examination of her.     I would like her to see Dr. Angelica Aguero to discusse injections and/or surgical decompression. She is not keen to proceed with surgery at this time; however, she is eager to have injections. I have also recommended that she use carpal tunnel braces; Angelica would like  to acquire them OTC.     With respect to driving, I have recommended considering a functional capacity evaluation. They will consider whether they would like to proceed.     I am happy to provide additional PT referrals as indicated.     I have encouraged them to contact the clinic in interim with any questions, concerns, or adverse clinical change.

## 2022-02-05 DIAGNOSIS — M79.641 BILATERAL HAND PAIN: Primary | ICD-10-CM

## 2022-02-05 DIAGNOSIS — M79.642 BILATERAL HAND PAIN: Primary | ICD-10-CM

## 2022-02-08 ENCOUNTER — CLINICAL SUPPORT (OUTPATIENT)
Dept: REHABILITATION | Facility: HOSPITAL | Age: 72
End: 2022-02-08
Payer: MEDICARE

## 2022-02-08 DIAGNOSIS — M62.81 MUSCLE WEAKNESS (GENERALIZED): ICD-10-CM

## 2022-02-08 PROCEDURE — 97112 NEUROMUSCULAR REEDUCATION: CPT | Mod: PO

## 2022-02-08 PROCEDURE — 97110 THERAPEUTIC EXERCISES: CPT | Mod: PO

## 2022-02-08 NOTE — PROGRESS NOTES
"[facility]   Physical Therapy Treatment Note     [facility]  Cl[facility] Number: [id number]    Therapy Diagnosis:   Encounter Diagnosis   Name Primary?    Muscle weakness (generalized)      Physician: Maribell Michaud MD    Visit Date: 2/8/2022       Physician Orders: PT Eval and Treat   Medical Diagnosis from Referral:   S06.5X9A (ICD-10-CM) - Subdural hematoma   R29.898 (ICD-10-CM) - Weakness of foot, right   Z74.09,Z78.9 (ICD-10-CM) - Impaired mobility and ADLs         Evaluation Date: 12/16/2021  Authorization Period Expiration: 12/3/2022  Plan of Care Expiration: 3/16/2022  Progress Note Due: 1/16/2021  Visit # / Visits authorized: 6/ 20( 8 total visits)   FOTO: 1/3  Precautions: Standard, Healing Craniotomy (6/4/2021)       PTA Visit #: 0/5     Time In: 1147   Time Out: 1232   Total Billable Time: 45 minutes    SUBJECTIVE     Pt reports: she had an EMG study recently which revealed that pt has carpal tunnel to both wrists, with R being more involved.   She was compliant with her home exercise program, including 2 new exercises added previous session.  Response to previous treatment: no adverse effects   Functional change: ongoing    Pain: 3-10  Location: right thumb and second 2 fingers, L side of neck and occiput    OBJECTIVE     Objective Measures updated at progress report unless specified. See below.    Treatment     Shirin received the treatments listed below:      therapeutic exercises to develop strength, endurance, ROM, flexibility, posture and core stabilization for 28 minutes including:    X 10 minutes on SCI-FIT recumbent stepper at level 3.0 for improved B UE and LE muscular and CV endurance  Sp02 97 % and HR 98 after exercise     2 x 10 wall squats ~ challenging and pt appears anxious with performance      2 x 10 B SLR with 2 " hold time, 1 # ankle weight added  2 x 10 B side lying hip abduction with 2 " hold time, 1 # ankle weight added~ light assist from " PT       neuromuscular re-education activities to improve: Balance, Coordination, Kinesthetic, Sense, Proprioception and Posture for 17 minutes. The following activities were included:        Outside parallel bars:    Tandem walking 2 x 30 feet, CGA  Eyes closed walking 3 x 30 feet, CGA  Backward walking 3 x 30 feet, SBA    1 x 10 B LE forward/backward step ups/downs on soft side of large Bosu, no UE support, CGA   1 x 10 alternate single limb step overs on soft side of large Bosu, no UE support, CGA/min A  1 x 10 R LE forward/backward step ups on soft side of large Bosu, no UE support, CGA/min A    Pilates chair:  1 x 10 B LE hip and knee flex/ext, working foot pad up and down, 2 black springs and 1 white spring, no UE support, CGA    1 x 10 R LE hip and knee flex/ext, working foot pad up and down, 2 black springs and 1 white spring, no UE support, CGA    ( some anxiety when performing the above activity)    therapeutic activities to improve functional performance for 0  minutes, including:        Patient Education and Home Exercises     Home Exercises Provided and Patient Education Provided     Education provided:   - 12/28/21: PT provided B upper trap stretch to help pt manage L neck pain. Pt was also provided a schedule slip for the month of January.  -1/6/22: PT provided pt a schedule slip for the month of February.  -2/8/22: PT and pt discussed pt's progress and pt is concerned about potential discharge on 2/22/22. PT indicated he would be willing to test pt next visit for objective progress and also add 2 additional visits to her current schedule.    Written Home Exercises Provided: yes. Exercises were reviewed and Shirin was able to demonstrate them prior to the end of the session.  Shirin demonstrated good  understanding of the education provided. See EMR under Patient Instructions for exercises provided during therapy sessions    ASSESSMENT     Shirin tolerated her therapy session well today and was compliant  "with most recently issued exercises for home. PT progressed these in clinic by having pt perform with 1 # ankle weight. New exercise of wall squats performed today and this was very challenging, with pt appearing nervous during performance. PT continues to have pt practice backward, eyes closed and tandem walking. Pt appears to be making slow improvements here. PT also continues to use Bosu and [employer] to practice modified and true SLS. Pt clearly struggles more with SLS on R LE as well as eccentric control of foot pad with R LE when working at [employer]. Overall, pt seems apprehensive about her potential discharge. Therefore, PT added 2 additional sessions. Pt remains appropriate for 1 x weekly physical therapy sessions to address her current deficits.    Shirin Is progressing well towards her goals.   Pt prognosis is Good.     Pt will continue to benefit from skilled outpatient physical therapy to address the deficits listed in the problem list box on initial evaluation, provide pt/family education and to maximize pt's level of independence in the home and community environment.     Pt's spiritual, cultural and educational needs considered and pt agreeable to plan of care and goals.     Anticipated barriers to physical therapy: Etiology of Symptoms, Scheduling    Goals:     Short Term Goals:  6 weeks  1. Pt. to demonstrate increased strength for right lower extremity to 5/5  to increase stability during community ambulation (progressing, not met)  2.  Pt to demonstrate increased strength for right upper extremity to 5/5 in order to perform functional activities (progressing, not met)  3. Pt. will improve Single Leg balance test score to >25 seconds in order to perform safe transfers and for patient to be in low/moderate risk for falls category (MET, 1/25/22)  4. Pt will improve 6MWT endurance test score to ambulate safely in community~ Revise this goal to state " pt to perform 6 minute walk test to establish " "baseline level of activity tolerance," 12/28/21  5. Pt will initiate home exercise program to improve strength, flexibility, endurance, mobility & balance to return pt to PLOF (MET, 1/25/22)  6.. Pt will tolerate 10 min or greater of time in light-->moderate intensity cardio (I.e. Bike, NuStep) to improve endurance to assist in community ambulation (progressing, not met)  7. Pt to demonstrate tandem stance 30 sec or greater w/out UE support in order to improve balance to progress to singe leg stance to decrease fall risk in performance of ADL's ~ delete this goal, 12/28/21  8. ( NEW GOAL, 12/28/21): pt to improve FGA score to 24/30 for improved community ambulation and decreased fall risk (MET, 1/25/22)  9. ( NEW GOAL, 12/28/21): pt to improve her score on condition 4 of the MEENA to 6 seconds for improved balance in tight spaces with  low/eliminated vision (MET, 1/25/22)  10.( NEW GOAL, 1/6/22): pt to improve her 6 minute walk test distance to 4509-8975 feet to demonstrate increased activity tolerance( MET, 1/25/22)       Long Term Goals:  8-12 weeks( established on 12/28/21)   1. Pt will improve Single Leg balance test score to 30 seconds or > in order to perform safe transfers and for patient to be in low/moderate risk for falls category (MET, 1/25/22)  2. Pt will be independent in finalized HEP to help maintain potential gains in PT(progressing, not met)  3. Pt will improve FGA score to 26/30 for improved community ambulation and decreased fall risk (MET, 1/25/22)  4. Pt will  tolerate 15 min or greater of time in light-->moderate intensity cardio (I.e. Bike, NuStep) to improve endurance to assist in community ambulation (progressing, not met)  5. Pt to improve her score on condition 4 of the MEENA to 9 seconds for improved balance in tight spaces with low/eliminated vision(MET, 1/25/22)  6.( NEW GOAL, 1/6/22): pt to improve her 6 minute walk test distance to 8399-9030 feet to demonstrate increased activity " tolerance( progressing, not met)      PLAN     Next visit: perform objective testing and potentially extend current plan of care.    Continue to progress pt with higher level balance, strengthening and endurance activities.      Abraham Singh, PT   2/8/2022

## 2022-02-10 ENCOUNTER — HOSPITAL ENCOUNTER (OUTPATIENT)
Dept: RADIOLOGY | Facility: OTHER | Age: 72
Discharge: HOME | End: 2022-02-10
Attending: ORTHOPAEDIC SURGERY
Payer: MEDICARE

## 2022-02-10 DIAGNOSIS — M79.642 BILATERAL HAND PAIN: ICD-10-CM

## 2022-02-10 DIAGNOSIS — M79.641 BILATERAL HAND PAIN: ICD-10-CM

## 2022-02-10 PROCEDURE — 73130 X-RAY EXAM OF HAND: CPT | Mod: TC,50,FY

## 2022-02-10 PROCEDURE — 73130 XR HAND COMPLETE 3 VIEWS BILATERAL: ICD-10-PCS | Mod: 26,50,, | Performed by: RADIOLOGY

## 2022-02-10 PROCEDURE — 73130 X-RAY EXAM OF HAND: CPT | Mod: 26,50,, | Performed by: RADIOLOGY

## 2022-02-11 ENCOUNTER — TELEPHONE (OUTPATIENT)
Dept: ORTHOPEDICS | Facility: CLINIC | Age: 72
End: 2022-02-11
Payer: MEDICARE

## 2022-02-11 NOTE — TELEPHONE ENCOUNTER
"Spoke c Ms. "[id number]" Confirmed appt location & time c Dr. Guerin 02/22/22. the patient completed Xray 02/10/22. Pt expressed understanding & was thankful.     "

## 2022-02-15 ENCOUNTER — CLINICAL SUPPORT (OUTPATIENT)
Dept: REHABILITATION | Facility: HOSPITAL | Age: 72
End: 2022-02-15
Payer: MEDICARE

## 2022-02-15 DIAGNOSIS — M62.81 MUSCLE WEAKNESS (GENERALIZED): ICD-10-CM

## 2022-02-15 PROCEDURE — 97112 NEUROMUSCULAR REEDUCATION: CPT | Mod: PO

## 2022-02-15 PROCEDURE — 97110 THERAPEUTIC EXERCISES: CPT | Mod: PO

## 2022-02-15 PROCEDURE — 97530 THERAPEUTIC ACTIVITIES: CPT | Mod: PO

## 2022-02-15 NOTE — PROGRESS NOTES
"[facility]   Physical Therapy Treatment Note     Name: [facility]  Clinic Number: [id number]    Therapy Diagnosis:   Encounter Diagnosis   Name Primary?    Muscle weakness (generalized)      Physician: Maribell Michaud MD    Visit Date: 2/15/2022       Physician Orders: PT Eval and Treat   Medical Diagnosis from Referral:   S06.5X9A (ICD-10-CM) - Subdural hematoma   R29.898 (ICD-10-CM) - Weakness of foot, right   Z74.09,Z78.9 (ICD-10-CM) - Impaired mobility and ADLs         Evaluation Date: 12/16/2021  Authorization Period Expiration: 12/3/2022  Plan of Care Expiration: 3/16/2022  Updated Plan of Care Expiration: 3/14/22  Progress Note Due: 1/16/2021  Visit # / Visits authorized: 7/ 20( 9 total visits)   FOTO: 1/3  Precautions: Standard, Healing Craniotomy (6/4/2021)       PTA Visit #: 0/5     Time In: 1140    Time Out: 1235   Total Billable Time: 55  minutes    SUBJECTIVE     Pt reports: she is " OK" this morning. She is scheduled to see ortho next week to investigate her options in terms of B carpal tunnel syndrome.   She was compliant with her home exercise program, including 2 new exercises added previous session.  Response to previous treatment: no adverse effects   Functional change: ongoing    Pain: 4.5-10  Location: right thumb and second 2 fingers, L side of neck and occiput, frontal aspect of head    OBJECTIVE     Objective Measures updated at progress report unless specified. See below.    Treatment     Shirin received the treatments listed below:      therapeutic exercises to develop strength, endurance, ROM, flexibility, posture and core stabilization for 15 minutes including:      Lower Extremity Strength~tested in sitting  Right LE eval 1/25/22 2/15/22 Left LE eval 1/25/22 2/15/22   Hip flexion:  NT 4+/5 4+/5 Hip flexion: NT 4/5 4/5   Knee extension: 4/5 5/5 5/5 Knee extension: 5/5 5/5 5/5   Knee flexion: 4/5 5/5 5/5 Knee flexion: 5/5 5/5 5/5   Ankle dorsiflexion:  " 4+/5 5/5 5/5 Ankle dorsiflexion: 5/5 5/5 5/5   Ankle plantarflexion:  NT 5/5 5/5 Ankle plantarflexion: NT 5/5 5/5   Hip abduction: NT 4+/5 4+/5 Hip abduction: NT 4+/5 4+/5   Hip adduction: NT 5/5 4-/5 Hip adduction NT 5/5 4+/5   Hip extension: NT 5/5 5/5 Hip extension: NT 5/5 5/5       1/6/22  Gait Assessment:  - AD used: none  - Assistance: independent  - 2 Minute Walk Test Distance: 496 feet  - 6 Minute Walk Test Distance: 1456 feet  - O2 sats after: 90 %  - HR after: 89     1/25/22  Gait Assessment:  - AD used: none  - Assistance: independent  - 2 Minute Walk Test Distance: 513 feet  - 6 Minute Walk Test Distance: 1505 feet  - O2 sats after: 85 %  - HR after: 109     2/15/22  Gait Assessment:  - AD used: none  - Assistance: independent  - 2 Minute Walk Test Distance: 507 feet  - 6 Minute Walk Test Distance: 1502 feet  - O2 sats after: 94 %  - HR after: 71           neuromuscular re-education activities to improve: Balance, Coordination, Kinesthetic, Sense, Proprioception and Posture for 30 minutes. The following activities were included:      Evaluation 12/28/22 1/25/22 2/15/22   Single Limb Stance R LE 20 seconds  (<10 sec = HIGH FALL RISK) 19 seconds  (<10 sec = HIGH FALL RISK) 30 seconds  (<10 sec = HIGH FALL RISK) 20 seconds  (<10 sec = HIGH FALL RISK)   Single Limb Stance L LE 20 seconds  (<10 sec = HIGH FALL RISK) 25 seconds  (<10 sec = HIGH FALL RISK) 30 seconds  (<10 sec = HIGH FALL RISK) 30 seconds-mild instability  (<10 sec = HIGH FALL RISK)     Functional Gait Assessment:   1. Gait on level surface =  2~ 6 seconds to travel 6 m              (3) Normal: less than 5.5 sec, no A.D., no imbalance, normal gait pattern, deviates< 6in              (2) Mild impairment: 7-5.6 sec, uses A.D., mild gait deviations, or deviates 6-10 in              (1) Moderate impairment: > 7 sec, slow speed, imbalance, deviates 10-15 in.              (0) Severe impairment: needs assist, deviates >15 in, reach/touch wall  2.  Change in Gait Speed = 3              (3) Normal: smooth change w/o loss of balance or gait deviation, deviates < 6 in, significant difference between speeds              (2) Mild impairment: changes speed, but demonstrates mild gait deviations, deviates 6-10 in, OR no deviations but unable to significantly speed, OR uses A.D.              (1) Moderate impairment: minor changes to speed, OR changes speed w/ significant deviations, deviates 10-15 in, OR  Changes speed , but loses balance & recovers              (0) Severe impairment: cannot change speed, deviates >15 in, or loses balance & needs assist  3. Gait with horizontal head turns  = 2              (3) Normal: no change in gait, deviates <6 in              (2) Mild impairment: slight change in speed, deviates 6-10 in, OR uses A.D.              (1) Moderate impairment: moderate change in speed, deviates 10-15 in              (0) Severe impairment: severe disruption of gait, deviates >15in  4. Gait with vertical head turns = 3              (3) Normal: no change in gait, deviates <6 in              (2) Mild impairment: slight change in speed, deviates 6-10 in OR uses A.D.              (1) Moderate impairment: moderate change in speed, deviates 10-15 in              (0) Severe impairment: severe disruption of gait, deviates >15 in  5. Gait with pivot turns = 3              (3) Normal: performs safely in 3 sec, no LOB              (2) Mild impairment: performs in >3 sec & no LOB, OR turns safely & requires several steps to regain LOB              (1) Moderate impairment: turns slow, OR requires several small steps for balance following turn & stop              (0) Severe impairment: cannot turn safely, needs assist  6. Step over obstacle = 3              (3) Normal: steps over 2 stacked boxes w/o change in speed or LOB              (2) Mild impairment: able to step over 1 box w/o change in speed or LOB              (1) Moderate impairment: steps over 1 box but must  "slow down, may require VC              (0) Severe impairment: cannot perform w/o assist  7. Gait with Narrow ALISSA = 2              (3) Normal: 10 steps no staggering              (2) Mild impairment: 7-9 steps              (1) Moderate impairment: 4-7 steps              (0) Severe impairment: < 4 steps or cannot perform w/o assist  8. Gait with eyes closed = 2              (3) Normal: < 7 sec, no A.D., no LOB, normal gait pattern, deviates <6 in              (2) Mild impairment: 7.1-9 sec, mild gait deviations, deviates 6-10 in              (1) Moderate impairment: > 9 sec, abnormal pattern, LOB, deviates 10-15 in              (0) Severe impairment: cannot perform w/o assist, LOB, deviates >15in  9. Ambulating Backwards = 2              (3) Normal: no A.D., no LOB, normal gait pattern, deviates <6in              (2) Mild impairment: uses A.D., slower speed, mild gait deviations, deviates 6-10 in              (1) Moderate impairment: slow speed, abnormal gait pattern, LOB, deviates 10-15 in              (0) Severe impairment: severe gait deviations or LOB, deviates >15in  10. Steps = 2              (3) Normal: alternating feet, no rail              (2) Mild Impairment: alternating feet, uses rail              (1) Moderate impairment: step-to, uses rail              (0) Severe impairment: cannot perform safely     Score 22/30 on 12/28/21; 26/30 on 1/25/22; 24/30 on 2/15/22     Score:   <22/30 fall risk   <20/30 fall risk in older adults   <18/30 fall risk in Parkinsons        1/25/22  Postural control:  [place] SENSORY TESTING:  (P= Pass, F= Fail; note any sway; hold each position for 30")  Condition 1: (firm surface/feet together/eyes open) P  Condition 2: (firm surface/feet together/eyes closed) P  Condition 3: (firm surface/feet in tandem/eyes open) P~ slight sway  Condition 4: (firm surface/feet in tandem/eyes closed) F, 10"  Condition 5: (soft surface/feet together/eyes open) P  Condition 6: (soft surface/feet " "together/eyes closed) P~ mild sway  Condition 7: (Fakuda step test), measure distance varied from center starting position NT    2/15/22  Postural control:  [place] SENSORY TESTING:  (P= Pass, F= Fail; note any sway; hold each position for 30")  Condition 1: (firm surface/feet together/eyes open) P  Condition 2: (firm surface/feet together/eyes closed) P  Condition 3: (firm surface/feet in tandem/eyes open) P~  very slight sway  Condition 4: (firm surface/feet in tandem/eyes closed) F, 12"  Condition 5: (soft surface/feet together/eyes open) P  Condition 6: (soft surface/feet together/eyes closed) P~ mild sway  Condition 7: (Fakuda step test), measure distance varied from center starting position NT        Endurance Assessment:    Evaluation 1/25/22 2/15/22   30 second Chair Rise  (adults > 61 y/o) 10 completed with no arms 13 completed with no arms 12 completed with no arms               therapeutic activities to improve functional performance for 10 minutes, including:      OTHER: PT reviewed pt's results and indicated to her that he will not add any additional visits to pt's current calendar. PT added 2 visits at her last session, before she was even retested. PT and pt then had a 10 minute+ discussion regarding her concerns about " falling off" when she ends her therapy time here. PT requested that pt write down on a piece of paper all the things she wishes to practice before her final session. 2 things which pt mentioned are descending stairs and being able to get off the floor comfortably. PT promised pt that he will address any functional tasks she wishes to practice.       Patient Education and Home Exercises     Home Exercises Provided and Patient Education Provided     Education provided:   - 12/28/21: PT provided B upper trap stretch to help pt manage L neck pain. Pt was also provided a schedule slip for the month of January.  -1/6/22: PT provided pt a schedule slip for the month of February.  -2/8/22: PT " and pt discussed pt's progress and pt is concerned about potential discharge on 2/22/22. PT indicated he would be willing to test pt next visit for objective progress and also add 2 additional visits to her current schedule.    Written Home Exercises Provided: yes. Exercises were reviewed and Shirin was able to demonstrate them prior to the end of the session.  Shirin demonstrated good  understanding of the education provided. See EMR under Patient Instructions for exercises provided during therapy sessions    ASSESSMENT     Shirin tolerated her therapy session fairly well today for her updated plan of care reassessment. Unfortunately, she did not perform quite as well today overall as she did at the time of her previous reassessment. This clearly bothered her, and she appeared nervous about PT's decision to discharge her after her 3/10/22 visit. While it is true that pt did not test as well today, the changes are minimal and PT also feels there is a ceiling effect in place. Pt is doing EXTREMELY well in all aspects, and it is difficult for her to perform at a very high level EACH time she is tested. More specifically, the changes noted today include a decreased B LE MMT score for hip adduction, which is hard to explain. Pt also performed 1 fewer repetition during 30 second chair rise. Her FGA score dipped from 26/30 to 24/30. And her R SLS time dropped from 30 seconds to 20 seconds. Her 6 minute walk distance is nearly identical to the previous test, and she did improve her time slightly during condition # 4 of the MEENA. Pt appears very apprehensive about discharge, and PT feels some of this is a self- confidence and emotional issue. Pt is clearly doing well enough to walk her neighborhood or join a gym and begin exercise classes. When PT questions her about this, she indicates the weather has kept her from walking and she is unsure what she would be able to do in an organized exercise class. Pt seems to feel that  "coming to therapy provides her a " life line" and gives her motivation. PT indicated pt will have to find a way to challenge and motivate herself without formal PT. PT will see pt for 3 more sessions and discharge on 3/10/22. Pt remains appropriate for 1 x weekly physical therapy sessions to address her current deficits.    Shirin Is progressing well towards her goals.   Pt prognosis is Good.     Pt will continue to benefit from skilled outpatient physical therapy to address the deficits listed in the problem list box on initial evaluation, provide pt/family education and to maximize pt's level of independence in the home and community environment.     Pt's spiritual, cultural and educational needs considered and pt agreeable to plan of care and goals.     Anticipated barriers to physical therapy: Etiology of Symptoms, Scheduling    Goals:     Short Term Goals:  6 weeks  1. Pt. to demonstrate increased strength for right lower extremity to 5/5  to increase stability during community ambulation (progressing, not met)  2.  Pt to demonstrate increased strength for right upper extremity to 5/5 in order to perform functional activities (delete this goal, 2/15/22)  3. Pt. will improve Single Leg balance test score to >25 seconds in order to perform safe transfers and for patient to be in low/moderate risk for falls category (MET, 1/25/22, partially met on 2/15/22)  4. Pt will improve 6MWT endurance test score to ambulate safely in community~ Revise this goal to state " pt to perform 6 minute walk test to establish baseline level of activity tolerance," 12/28/21  5. Pt will initiate home exercise program to improve strength, flexibility, endurance, mobility & balance to return pt to PLOF (MET, 1/25/22)  6.. Pt will tolerate 10 min or greater of time in light-->moderate intensity cardio (I.e. Bike, NuStep) to improve endurance to assist in community ambulation (MET, 1/20/22)  7. Pt to demonstrate tandem stance 30 sec or " greater w/out UE support in order to improve balance to progress to singe leg stance to decrease fall risk in performance of ADL's ~ delete this goal, 12/28/21  8. ( NEW GOAL, 12/28/21): pt to improve FGA score to 24/30 for improved community ambulation and decreased fall risk (MET, 1/25/22)  9. ( NEW GOAL, 12/28/21): pt to improve her score on condition 4 of the MEENA to 6 seconds for improved balance in tight spaces with  low/eliminated vision (MET, 1/25/22)  10.( NEW GOAL, 1/6/22): pt to improve her 6 minute walk test distance to 9426-4552 feet to demonstrate increased activity tolerance( MET, 1/25/22)       Long Term Goals:  8-12 weeks( established on 12/28/21)   1. Pt will improve Single Leg balance test score to 30 seconds or > in order to perform safe transfers and for patient to be in low/moderate risk for falls category (MET, 1/25/22)  2. Pt will be independent in finalized HEP to help maintain potential gains in PT(progressing, not met)  3. Pt will improve FGA score to 26/30 for improved community ambulation and decreased fall risk (MET, 1/25/22, NOT MET, 2/15/22)  4. Pt will  tolerate 15 min or greater of time in light-->moderate intensity cardio (I.e. Bike, NuStep) to improve endurance to assist in community ambulation (progressing, not met)  5. Pt to improve her score on condition 4 of the MEENA to 9 seconds for improved balance in tight spaces with low/eliminated vision(MET, 1/25/22)  6.( NEW GOAL, 1/6/22): pt to improve her 6 minute walk test distance to 2065-1472 feet to demonstrate increased activity tolerance( progressing, not met) ~ Revise this goal slightly to 6346-2527 feet, 2/15/22      PLAN     Next session: increase time on the recumbent stepper; work on any functional activities pt writes down on her homework assignment    Continue outpatient physical therapy 1 x weekly under updated Plan of Care, 2/15/22 to 3/14/22, with treatment to include: pt education, HEP, therapeutic exercises,  neuromuscular re-education/balance exercises, therapeutic activities, joint mobilizations, and modalities PRN, to work towards established goals. Pt may be seen by PTA to carry out plan of care.       Continue to progress pt with higher level balance, strengthening and endurance activities.      Abraham Singh, PT   2/15/2022

## 2022-02-15 NOTE — PLAN OF CARE
"[facility]   Physical Therapy Treatment Note     Name: Shirin Villafana [facility]  Clinic Number: [id number]    Therapy Diagnosis:   Encounter Diagnosis   Name Primary?    Muscle weakness (generalized)      Physician: Maribell Michaud MD    Visit Date: 2/15/2022       Physician Orders: PT Eval and Treat   Medical Diagnosis from Referral:   S06.5X9A (ICD-10-CM) - Subdural hematoma   R29.898 (ICD-10-CM) - Weakness of foot, right   Z74.09,Z78.9 (ICD-10-CM) - Impaired mobility and ADLs         Evaluation Date: 12/16/2021  Authorization Period Expiration: 12/3/2022  Plan of Care Expiration: 3/16/2022  Updated Plan of Care Expiration: 3/14/22  Progress Note Due: 1/16/2021  Visit # / Visits authorized: 6/ 20( 9 total visits)   FOTO: 1/3  Precautions: Standard, Healing Craniotomy (6/4/2021)       PTA Visit #: 0/5     Time In: 1140    Time Out: 1235   Total Billable Time: 55  minutes    SUBJECTIVE     Pt reports: she is " OK" this morning. She is scheduled to see ortho next week to investigate her options in terms of B carpal tunnel syndrome.   She was compliant with her home exercise program, including 2 new exercises added previous session.  Response to previous treatment: no adverse effects   Functional change: ongoing    Pain: 4.5-10  Location: right thumb and second 2 fingers, L side of neck and occiput, frontal aspect of head    OBJECTIVE     Objective Measures updated at progress report unless specified. See below.    Treatment     Shirin received the treatments listed below:      therapeutic exercises to develop strength, endurance, ROM, flexibility, posture and core stabilization for 15 minutes including:      Lower Extremity Strength~tested in sitting  Right LE eval 1/25/22 2/15/22 Left LE eval 1/25/22 2/15/22   Hip flexion:  NT 4+/5 4+/5 Hip flexion: NT 4/5 4/5   Knee extension: 4/5 5/5 5/5 Knee extension: 5/5 5/5 5/5   Knee flexion: 4/5 5/5 5/5 Knee flexion: 5/5 5/5 5/5   Ankle dorsiflexion:  " 4+/5 5/5 5/5 Ankle dorsiflexion: 5/5 5/5 5/5   Ankle plantarflexion:  NT 5/5 5/5 Ankle plantarflexion: NT 5/5 5/5   Hip abduction: NT 4+/5 4+/5 Hip abduction: NT 4+/5 4+/5   Hip adduction: NT 5/5 4-/5 Hip adduction NT 5/5 4+/5   Hip extension: NT 5/5 5/5 Hip extension: NT 5/5 5/5       1/6/22  Gait Assessment:  - AD used: none  - Assistance: independent  - 2 Minute Walk Test Distance: 496 feet  - 6 Minute Walk Test Distance: 1456 feet  - O2 sats after: 90 %  - HR after: 89     1/25/22  Gait Assessment:  - AD used: none  - Assistance: independent  - 2 Minute Walk Test Distance: 513 feet  - 6 Minute Walk Test Distance: 1505 feet  - O2 sats after: 85 %  - HR after: 109     2/15/22  Gait Assessment:  - AD used: none  - Assistance: independent  - 2 Minute Walk Test Distance: 507 feet  - 6 Minute Walk Test Distance: 1502 feet  - O2 sats after: 94 %  - HR after: 71           neuromuscular re-education activities to improve: Balance, Coordination, Kinesthetic, Sense, Proprioception and Posture for 30 minutes. The following activities were included:      Evaluation 12/28/22 1/25/22 2/15/22   Single Limb Stance R LE 20 seconds  (<10 sec = HIGH FALL RISK) 19 seconds  (<10 sec = HIGH FALL RISK) 30 seconds  (<10 sec = HIGH FALL RISK) 20 seconds  (<10 sec = HIGH FALL RISK)   Single Limb Stance L LE 20 seconds  (<10 sec = HIGH FALL RISK) 25 seconds  (<10 sec = HIGH FALL RISK) 30 seconds  (<10 sec = HIGH FALL RISK) 30 seconds-mild instability  (<10 sec = HIGH FALL RISK)     Functional Gait Assessment:   1. Gait on level surface =  2~ 6 seconds to travel 6 m              (3) Normal: less than 5.5 sec, no A.D., no imbalance, normal gait pattern, deviates< 6in              (2) Mild impairment: 7-5.6 sec, uses A.D., mild gait deviations, or deviates 6-10 in              (1) Moderate impairment: > 7 sec, slow speed, imbalance, deviates 10-15 in.              (0) Severe impairment: needs assist, deviates >15 in, reach/touch wall  2.  Change in Gait Speed = 3              (3) Normal: smooth change w/o loss of balance or gait deviation, deviates < 6 in, significant difference between speeds              (2) Mild impairment: changes speed, but demonstrates mild gait deviations, deviates 6-10 in, OR no deviations but unable to significantly speed, OR uses A.D.              (1) Moderate impairment: minor changes to speed, OR changes speed w/ significant deviations, deviates 10-15 in, OR  Changes speed , but loses balance & recovers              (0) Severe impairment: cannot change speed, deviates >15 in, or loses balance & needs assist  3. Gait with horizontal head turns  = 2              (3) Normal: no change in gait, deviates <6 in              (2) Mild impairment: slight change in speed, deviates 6-10 in, OR uses A.D.              (1) Moderate impairment: moderate change in speed, deviates 10-15 in              (0) Severe impairment: severe disruption of gait, deviates >15in  4. Gait with vertical head turns = 3              (3) Normal: no change in gait, deviates <6 in              (2) Mild impairment: slight change in speed, deviates 6-10 in OR uses A.D.              (1) Moderate impairment: moderate change in speed, deviates 10-15 in              (0) Severe impairment: severe disruption of gait, deviates >15 in  5. Gait with pivot turns = 3              (3) Normal: performs safely in 3 sec, no LOB              (2) Mild impairment: performs in >3 sec & no LOB, OR turns safely & requires several steps to regain LOB              (1) Moderate impairment: turns slow, OR requires several small steps for balance following turn & stop              (0) Severe impairment: cannot turn safely, needs assist  6. Step over obstacle = 3              (3) Normal: steps over 2 stacked boxes w/o change in speed or LOB              (2) Mild impairment: able to step over 1 box w/o change in speed or LOB              (1) Moderate impairment: steps over 1 box but must  "slow down, may require VC              (0) Severe impairment: cannot perform w/o assist  7. Gait with Narrow ALISSA = 2              (3) Normal: 10 steps no staggering              (2) Mild impairment: 7-9 steps              (1) Moderate impairment: 4-7 steps              (0) Severe impairment: < 4 steps or cannot perform w/o assist  8. Gait with eyes closed = 2              (3) Normal: < 7 sec, no A.D., no LOB, normal gait pattern, deviates <6 in              (2) Mild impairment: 7.1-9 sec, mild gait deviations, deviates 6-10 in              (1) Moderate impairment: > 9 sec, abnormal pattern, LOB, deviates 10-15 in              (0) Severe impairment: cannot perform w/o assist, LOB, deviates >15in  9. Ambulating Backwards = 2              (3) Normal: no A.D., no LOB, normal gait pattern, deviates <6in              (2) Mild impairment: uses A.D., slower speed, mild gait deviations, deviates 6-10 in              (1) Moderate impairment: slow speed, abnormal gait pattern, LOB, deviates 10-15 in              (0) Severe impairment: severe gait deviations or LOB, deviates >15in  10. Steps = 2              (3) Normal: alternating feet, no rail              (2) Mild Impairment: alternating feet, uses rail              (1) Moderate impairment: step-to, uses rail              (0) Severe impairment: cannot perform safely     Score 22/30 on 12/28/21; 26/30 on 1/25/22; 24/30 on 2/15/22     Score:   <22/30 fall risk   <20/30 fall risk in older adults   <18/30 fall risk in Parkinsons        1/25/22  Postural control:  [place] SENSORY TESTING:  (P= Pass, F= Fail; note any sway; hold each position for 30")  Condition 1: (firm surface/feet together/eyes open) P  Condition 2: (firm surface/feet together/eyes closed) P  Condition 3: (firm surface/feet in tandem/eyes open) P~ slight sway  Condition 4: (firm surface/feet in tandem/eyes closed) F, 10"  Condition 5: (soft surface/feet together/eyes open) P  Condition 6: (soft surface/feet " "together/eyes closed) P~ mild sway  Condition 7: (Fakuda step test), measure distance varied from center starting position NT    2/15/22  Postural control:  [place] SENSORY TESTING:  (P= Pass, F= Fail; note any sway; hold each position for 30")  Condition 1: (firm surface/feet together/eyes open) P  Condition 2: (firm surface/feet together/eyes closed) P  Condition 3: (firm surface/feet in tandem/eyes open) P~  very slight sway  Condition 4: (firm surface/feet in tandem/eyes closed) F, 12"  Condition 5: (soft surface/feet together/eyes open) P  Condition 6: (soft surface/feet together/eyes closed) P~ mild sway  Condition 7: (Fakuda step test), measure distance varied from center starting position NT        Endurance Assessment:    Evaluation 1/25/22 2/15/22   30 second Chair Rise  (adults > 59 y/o) 10 completed with no arms 13 completed with no arms 12 completed with no arms               therapeutic activities to improve functional performance for 10 minutes, including:      OTHER: PT reviewed pt's results and indicated to her that he will not add any additional visits to pt's current calendar. PT added 2 visits at her last session, before she was even retested. PT and pt then had a 10 minute+ discussion regarding her concerns about " falling off" when she ends her therapy time here. PT requested that pt write down on a piece of paper all the things she wishes to practice before her final session. 2 things which pt mentioned are descending stairs and being able to get off the floor comfortably. PT promised pt that he will address any functional tasks she wishes to practice.       Patient Education and Home Exercises     Home Exercises Provided and Patient Education Provided     Education provided:   - 12/28/21: PT provided B upper trap stretch to help pt manage L neck pain. Pt was also provided a schedule slip for the month of January.  -1/6/22: PT provided pt a schedule slip for the month of February.  -2/8/22: PT " and pt discussed pt's progress and pt is concerned about potential discharge on 2/22/22. PT indicated he would be willing to test pt next visit for objective progress and also add 2 additional visits to her current schedule.    Written Home Exercises Provided: yes. Exercises were reviewed and Shirin was able to demonstrate them prior to the end of the session.  Shirin demonstrated good  understanding of the education provided. See EMR under Patient Instructions for exercises provided during therapy sessions    ASSESSMENT     Shirin tolerated her therapy session fairly well today for her updated plan of care reassessment. Unfortunately, she did not perform quite as well today overall as she did at the time of her previous reassessment. This clearly bothered her, and she appeared nervous about PT's decision to discharge her after her 3/10/22 visit. While it is true that pt did not test as well today, the changes are minimal and PT also feels there is a ceiling effect in place. Pt is doing EXTREMELY well in all aspects, and it is difficult for her to perform at a very high level EACH time she is tested. More specifically, the changes noted today include a decreased B LE MMT score for hip adduction, which is hard to explain. Pt also performed 1 fewer repetition during 30 second chair rise. Her FGA score dipped from 26/30 to 24/30. And her R SLS time dropped from 30 seconds to 20 seconds. Her 6 minute walk distance is nearly identical to the previous test, and she did improve her time slightly during condition # 4 of the MEENA. Pt appears very apprehensive about discharge, and PT feels some of this is a self- confidence and emotional issue. Pt is clearly doing well enough to walk her neighborhood or join a gym and begin exercise classes. When PT questions her about this, she indicates the weather has kept her from walking and she is unsure what she would be able to do in an organized exercise class. Pt seems to feel that  "coming to therapy provides her a " life line" and gives her motivation. PT indicated pt will have to find a way to challenge and motivate herself without formal PT. PT will see pt for 3 more sessions and discharge on 3/10/22. Pt remains appropriate for 1 x weekly physical therapy sessions to address her current deficits.    Shirin Is progressing well towards her goals.   Pt prognosis is Good.     Pt will continue to benefit from skilled outpatient physical therapy to address the deficits listed in the problem list box on initial evaluation, provide pt/family education and to maximize pt's level of independence in the home and community environment.     Pt's spiritual, cultural and educational needs considered and pt agreeable to plan of care and goals.     Anticipated barriers to physical therapy: Etiology of Symptoms, Scheduling    Goals:     Short Term Goals:  6 weeks  1. Pt. to demonstrate increased strength for right lower extremity to 5/5  to increase stability during community ambulation (progressing, not met)  2.  Pt to demonstrate increased strength for right upper extremity to 5/5 in order to perform functional activities (delete this goal, 2/15/22)  3. Pt. will improve Single Leg balance test score to >25 seconds in order to perform safe transfers and for patient to be in low/moderate risk for falls category (MET, 1/25/22, partially met on 2/15/22)  4. Pt will improve 6MWT endurance test score to ambulate safely in community~ Revise this goal to state " pt to perform 6 minute walk test to establish baseline level of activity tolerance," 12/28/21  5. Pt will initiate home exercise program to improve strength, flexibility, endurance, mobility & balance to return pt to PLOF (MET, 1/25/22)  6.. Pt will tolerate 10 min or greater of time in light-->moderate intensity cardio (I.e. Bike, NuStep) to improve endurance to assist in community ambulation (MET, 1/20/22)  7. Pt to demonstrate tandem stance 30 sec or " greater w/out UE support in order to improve balance to progress to singe leg stance to decrease fall risk in performance of ADL's ~ delete this goal, 12/28/21  8. ( NEW GOAL, 12/28/21): pt to improve FGA score to 24/30 for improved community ambulation and decreased fall risk (MET, 1/25/22)  9. ( NEW GOAL, 12/28/21): pt to improve her score on condition 4 of the MEENA to 6 seconds for improved balance in tight spaces with  low/eliminated vision (MET, 1/25/22)  10.( NEW GOAL, 1/6/22): pt to improve her 6 minute walk test distance to 1425-7784 feet to demonstrate increased activity tolerance( MET, 1/25/22)       Long Term Goals:  8-12 weeks( established on 12/28/21)   1. Pt will improve Single Leg balance test score to 30 seconds or > in order to perform safe transfers and for patient to be in low/moderate risk for falls category (MET, 1/25/22)  2. Pt will be independent in finalized HEP to help maintain potential gains in PT(progressing, not met)  3. Pt will improve FGA score to 26/30 for improved community ambulation and decreased fall risk (MET, 1/25/22, NOT MET, 2/15/22)  4. Pt will  tolerate 15 min or greater of time in light-->moderate intensity cardio (I.e. Bike, NuStep) to improve endurance to assist in community ambulation (progressing, not met)  5. Pt to improve her score on condition 4 of the MEENA to 9 seconds for improved balance in tight spaces with low/eliminated vision(MET, 1/25/22)  6.( NEW GOAL, 1/6/22): pt to improve her 6 minute walk test distance to 3404-9650 feet to demonstrate increased activity tolerance( progressing, not met) ~ Revise this goal slightly to 8805-9386 feet, 2/15/22      PLAN     Next session: increase time on the recumbent stepper; work on any functional activities pt writes down on her homework assignment    Continue outpatient physical therapy 1 x weekly under updated Plan of Care, 2/15/22 to 3/14/22, with treatment to include: pt education, HEP, therapeutic exercises,  neuromuscular re-education/balance exercises, therapeutic activities, joint mobilizations, and modalities PRN, to work towards established goals. Pt may be seen by PTA to carry out plan of care.       Continue to progress pt with higher level balance, strengthening and endurance activities.      Abraham Singh, PT   2/15/2022

## 2022-02-18 ENCOUNTER — TELEPHONE (OUTPATIENT)
Dept: ORTHOPEDICS | Facility: CLINIC | Age: 72
End: 2022-02-18
Payer: MEDICARE

## 2022-02-18 NOTE — TELEPHONE ENCOUNTER
Spoke c pt. Confirmed 02/22/22 appt location & time c Dr. Guerin. Pt will call c additional questions/concerns in interim. Pt expressed understanding & was thankful.

## 2022-02-21 NOTE — PROGRESS NOTES
[facility]   Physical Therapy Treatment Note     Name: Shirin Villafana [facility]  Clinic Number: [id number]    Therapy Diagnosis:   Encounter Diagnosis   Name Primary?    Muscle weakness (generalized) Yes     Physician: Maribell Michaud MD    Visit Date: 2/22/2022       Physician Orders: PT Eval and Treat   Medical Diagnosis from Referral:   S06.5X9A (ICD-10-CM) - Subdural hematoma   R29.898 (ICD-10-CM) - Weakness of foot, right   Z74.09,Z78.9 (ICD-10-CM) - Impaired mobility and ADLs         Evaluation Date: 12/16/2021  Authorization Period Expiration: 12/3/2022  Plan of Care Expiration: 3/16/2022  Updated Plan of Care Expiration: 3/14/22  Progress Note Due: 1/16/2021  Visit # / Visits authorized: 8/ 20( 10 total visits)   FOTO: 1/3  Precautions: Standard, Healing Craniotomy (6/4/2021)       PTA Visit #: 0/5     Time In: 1145    Time Out: 1230   Total Billable Time: 45   minutes    SUBJECTIVE     Pt reports: she had injections to her wrists this morning for management of carpal tunnel syndrome.   She was compliant with her home exercise program  Response to previous treatment: no adverse effects   Functional change: ongoing    Pain: 4-10  Location: right thumb and second 2 fingers, L side of neck     OBJECTIVE     Objective Measures updated at progress report unless specified. See below.    Treatment     Shirin received the treatments listed below:      therapeutic exercises to develop strength, endurance, ROM, flexibility, posture and core stabilization for 45 minutes including:     X 13 minutes on upright bicycle  at level 1.0 for improved B LE muscular and CV endurance  Sp02 97 % and HR 90 after exercise       Pt provided PT with list of functional activities and areas of concern that we address during today's session. These include:  -Exercises for UE  -Exercises for core strengthening  -Going down stairs  -Getting up from the floor  -Carrying packages  -Getting into and out of car( this  unable to be performed today as pt does not drive to her appointment)  -Bending to pick things up    Pt descends 27 steps near lobby x 2 trials with use of 1 railing, S~ pt performs mix of non-reciprocal and reciprocal patterns~ PT encourages pt to perform reciprocal. Pt ascends 27 steps x 2 trials with use of 1 railing, reciprocal pattern, mod I      Hook lying on mat:  2 x 10 abdominal crunches, pt lifts head only~ voices some concerns about straining her neck with this exercise, so PT instructs her in alternate methods( lifting legs only and keeping head and trunk flat on mat)    2 x 10 wall squats~ cues for proper performance    Pt performs one standing<> floor<> standing transfer mod I with good technique    Pt instructed in proper lifting and moving of milk crate with 6 # weights inside~ pt performs multiple attempts, moving crate from mat<> floor and from mat<> mat~ good form demonstrated    PT demonstrated bird dog exercise for additional core strengthening~ pt did not perform this today due to receiving B wrist injections for carpal tunnel this morning    PT also demonstrated 3 UE exercises with use of theraband: B biceps curls, unilateral shoulder flexion and abduction. Pt also did not perform these exercises today due to receiving wrist injections this morning.      neuromuscular re-education activities to improve: Balance, Coordination, Kinesthetic, Sense, Proprioception and Posture for 0 minutes. The following activities were included:          therapeutic activities to improve functional performance for 0 minutes, including:          Patient Education and Home Exercises     Home Exercises Provided and Patient Education Provided     Education provided:   - 12/28/21: PT provided B upper trap stretch to help pt manage L neck pain. Pt was also provided a schedule slip for the month of January.  -1/6/22: PT provided pt a schedule slip for the month of February.  -2/8/22: PT and pt discussed pt's progress  and pt is concerned about potential discharge on 2/22/22. PT indicated he would be willing to test pt next visit for objective progress and also add 2 additional visits to her current schedule.    Written Home Exercises Provided: yes. Exercises were reviewed and Shirin was able to demonstrate them prior to the end of the session.  Shirin demonstrated good  understanding of the education provided. See EMR under Patient Instructions for exercises provided during therapy sessions    ASSESSMENT     Shirin tolerated her therapy session well today and brought a list of functional activities/exercises she wanted to work on during today's session. These activities were covered and pt seemed to feel better after practicing each one. PT unable to practice car transfers as pt gets dropped off for her appointments. However, PT viewed pt getting into her car as she left today and she appeared to have no problems. Pt was not able to practice all that PT taught her due to receiving B injections to her wrists to manage carpal tunnel syndrome. PT plans to provide handouts for exercises she was taught today and could not perform. PT also had pt work on the upright bicycle instead of the SCI-FIT stepper to prevent any unwanted wrist discomfort. PT is confident pt will be able to complete 15 minutes here next session and achieve her LTG. Pt  Pt remains appropriate for 1 x weekly physical therapy sessions to address her current deficits.        Shirin Is progressing well towards her goals.   Pt prognosis is Good.     Pt will continue to benefit from skilled outpatient physical therapy to address the deficits listed in the problem list box on initial evaluation, provide pt/family education and to maximize pt's level of independence in the home and community environment.     Pt's spiritual, cultural and educational needs considered and pt agreeable to plan of care and goals.     Anticipated barriers to physical therapy: Etiology of Symptoms,  "Scheduling    Goals:     Short Term Goals:  6 weeks  1. Pt. to demonstrate increased strength for right lower extremity to 5/5  to increase stability during community ambulation (progressing, not met)  2.  Pt to demonstrate increased strength for right upper extremity to 5/5 in order to perform functional activities (delete this goal, 2/15/22)  3. Pt. will improve Single Leg balance test score to >25 seconds in order to perform safe transfers and for patient to be in low/moderate risk for falls category (MET, 1/25/22, partially met on 2/15/22)  4. Pt will improve 6MWT endurance test score to ambulate safely in community~ Revise this goal to state " pt to perform 6 minute walk test to establish baseline level of activity tolerance," 12/28/21  5. Pt will initiate home exercise program to improve strength, flexibility, endurance, mobility & balance to return pt to PLOF (MET, 1/25/22)  6.. Pt will tolerate 10 min or greater of time in light-->moderate intensity cardio (I.e. Bike, NuStep) to improve endurance to assist in community ambulation (MET, 1/20/22)  7. Pt to demonstrate tandem stance 30 sec or greater w/out UE support in order to improve balance to progress to singe leg stance to decrease fall risk in performance of ADL's ~ delete this goal, 12/28/21  8. ( NEW GOAL, 12/28/21): pt to improve FGA score to 24/30 for improved community ambulation and decreased fall risk (MET, 1/25/22)  9. ( NEW GOAL, 12/28/21): pt to improve her score on condition 4 of the MEENA to 6 seconds for improved balance in tight spaces with  low/eliminated vision (MET, 1/25/22)  10.( NEW GOAL, 1/6/22): pt to improve her 6 minute walk test distance to 9166-9180 feet to demonstrate increased activity tolerance( MET, 1/25/22)       Long Term Goals:  8-12 weeks( established on 12/28/21)   1. Pt will improve Single Leg balance test score to 30 seconds or > in order to perform safe transfers and for patient to be in low/moderate risk for falls " category (MET, 1/25/22)  2. Pt will be independent in finalized HEP to help maintain potential gains in PT(progressing, not met)  3. Pt will improve FGA score to 26/30 for improved community ambulation and decreased fall risk (MET, 1/25/22, NOT MET, 2/15/22)  4. Pt will  tolerate 15 min or greater of time in light-->moderate intensity cardio (I.e. Bike, NuStep) to improve endurance to assist in community ambulation (progressing, not met)  5. Pt to improve her score on condition 4 of the MEENA to 9 seconds for improved balance in tight spaces with low/eliminated vision(MET, 1/25/22)  6.( NEW GOAL, 1/6/22): pt to improve her 6 minute walk test distance to 7468-4562 feet to demonstrate increased activity tolerance( progressing, not met) ~ Revise this goal slightly to 1702-7119 feet, 2/15/22      PLAN     Next session: provide handouts and practice exercises pt learned today.    Continue to progress pt with higher level balance, strengthening and endurance activities.      Abraham Singh, PT   2/22/2022

## 2022-02-22 ENCOUNTER — CLINICAL SUPPORT (OUTPATIENT)
Dept: REHABILITATION | Facility: HOSPITAL | Age: 72
End: 2022-02-22
Payer: MEDICARE

## 2022-02-22 ENCOUNTER — OFFICE VISIT (OUTPATIENT)
Dept: ORTHOPEDICS | Facility: CLINIC | Age: 72
End: 2022-02-22
Payer: MEDICARE

## 2022-02-22 VITALS — BODY MASS INDEX: 27.82 KG/M2 | HEIGHT: 63 IN | WEIGHT: 157 LBS

## 2022-02-22 DIAGNOSIS — G56.03 BILATERAL CARPAL TUNNEL SYNDROME: Primary | ICD-10-CM

## 2022-02-22 DIAGNOSIS — M62.81 MUSCLE WEAKNESS (GENERALIZED): Primary | ICD-10-CM

## 2022-02-22 PROCEDURE — 20526 THER INJECTION CARP TUNNEL: CPT | Mod: 50,S$GLB,, | Performed by: ORTHOPAEDIC SURGERY

## 2022-02-22 PROCEDURE — 1125F AMNT PAIN NOTED PAIN PRSNT: CPT | Mod: CPTII,S$GLB,, | Performed by: ORTHOPAEDIC SURGERY

## 2022-02-22 PROCEDURE — 1125F PR PAIN SEVERITY QUANTIFIED, PAIN PRESENT: ICD-10-PCS | Mod: CPTII,S$GLB,, | Performed by: ORTHOPAEDIC SURGERY

## 2022-02-22 PROCEDURE — 1101F PR PT FALLS ASSESS DOC 0-1 FALLS W/OUT INJ PAST YR: ICD-10-PCS | Mod: CPTII,S$GLB,, | Performed by: ORTHOPAEDIC SURGERY

## 2022-02-22 PROCEDURE — 1159F PR MEDICATION LIST DOCUMENTED IN MEDICAL RECORD: ICD-10-PCS | Mod: CPTII,S$GLB,, | Performed by: ORTHOPAEDIC SURGERY

## 2022-02-22 PROCEDURE — 99999 PR PBB SHADOW E&M-EST. PATIENT-LVL II: CPT | Mod: PBBFAC,,, | Performed by: ORTHOPAEDIC SURGERY

## 2022-02-22 PROCEDURE — 3008F PR BODY MASS INDEX (BMI) DOCUMENTED: ICD-10-PCS | Mod: CPTII,S$GLB,, | Performed by: ORTHOPAEDIC SURGERY

## 2022-02-22 PROCEDURE — 3008F BODY MASS INDEX DOCD: CPT | Mod: CPTII,S$GLB,, | Performed by: ORTHOPAEDIC SURGERY

## 2022-02-22 PROCEDURE — 3288F PR FALLS RISK ASSESSMENT DOCUMENTED: ICD-10-PCS | Mod: CPTII,S$GLB,, | Performed by: ORTHOPAEDIC SURGERY

## 2022-02-22 PROCEDURE — 97110 THERAPEUTIC EXERCISES: CPT | Mod: PO

## 2022-02-22 PROCEDURE — 99999 PR PBB SHADOW E&M-EST. PATIENT-LVL II: ICD-10-PCS | Mod: PBBFAC,,, | Performed by: ORTHOPAEDIC SURGERY

## 2022-02-22 PROCEDURE — 20526 CARPAL TUNNEL: ICD-10-PCS | Mod: 50,S$GLB,, | Performed by: ORTHOPAEDIC SURGERY

## 2022-02-22 PROCEDURE — 3288F FALL RISK ASSESSMENT DOCD: CPT | Mod: CPTII,S$GLB,, | Performed by: ORTHOPAEDIC SURGERY

## 2022-02-22 PROCEDURE — 99204 OFFICE O/P NEW MOD 45 MIN: CPT | Mod: 25,S$GLB,, | Performed by: ORTHOPAEDIC SURGERY

## 2022-02-22 PROCEDURE — 1159F MED LIST DOCD IN RCRD: CPT | Mod: CPTII,S$GLB,, | Performed by: ORTHOPAEDIC SURGERY

## 2022-02-22 PROCEDURE — 99204 PR OFFICE/OUTPT VISIT, NEW, LEVL IV, 45-59 MIN: ICD-10-PCS | Mod: 25,S$GLB,, | Performed by: ORTHOPAEDIC SURGERY

## 2022-02-22 PROCEDURE — 1101F PT FALLS ASSESS-DOCD LE1/YR: CPT | Mod: CPTII,S$GLB,, | Performed by: ORTHOPAEDIC SURGERY

## 2022-02-22 RX ORDER — DEXAMETHASONE SODIUM PHOSPHATE 4 MG/ML
4 INJECTION, SOLUTION INTRA-ARTICULAR; INTRALESIONAL; INTRAMUSCULAR; INTRAVENOUS; SOFT TISSUE
Status: DISCONTINUED | OUTPATIENT
Start: 2022-02-22 | End: 2022-02-22 | Stop reason: HOSPADM

## 2022-02-22 RX ADMIN — DEXAMETHASONE SODIUM PHOSPHATE 4 MG: 4 INJECTION, SOLUTION INTRA-ARTICULAR; INTRALESIONAL; INTRAMUSCULAR; INTRAVENOUS; SOFT TISSUE at 09:02

## 2022-02-22 NOTE — PROCEDURES
Carpal Tunnel    Date/Time: 2/22/2022 9:15 AM  Performed by: Angelica Aguero MD  Authorized by: Angelica Aguero MD     Consent Done?:  Yes (Verbal)  Indications:  Pain  Timeout: prior to procedure the correct patient, procedure, and site was verified    Prep: patient was prepped and draped in usual sterile fashion      Local anesthesia used?: Yes    Anesthesia:  Local infiltration  Local anesthetic:  Lidocaine 1% without epinephrine  Location:  Wrist  Site:  L carpal tunnel  Ultrasonic Guidance for Needle Placement?: Yes    Needle size:  25 G  Approach:  Volar  Medications:  4 mg dexamethasone 4 mg/mL

## 2022-02-22 NOTE — PROGRESS NOTES
Clinic Note  Orthopaedics    SUBJECTIVE:     History of Present Illness:  Patient is a 71 y.o. female history of recent craniotomy here for bilateral carpal tunnel syndrome right greater than left.  Severe on the right side.  She is having constant numbness tingling in the right hand.  Little bit less on the left.  Primarily in the 1st 3 digits bilaterally.  She is doing better from a cranial standpoint.  Her surgery was in June and is feeling better now.  She is a caretaker couple times a week.  She is hesitant of surgery given that she had this recent large cranial event.  She has had no conservative management yet.    Review of patient's allergies indicates:  No Known Allergies    Past Medical History:   Diagnosis Date    Carpal tunnel syndrome, bilateral 2/3/2022    Headache     Hypertension     Movement disorder     Pulmonary embolism     Syncope and collapse     Weakness of right lower extremity 6/3/2021     Past Surgical History:   Procedure Laterality Date    CEREBRAL ANGIOGRAM N/A 6/6/2021    Procedure: ANGIOGRAM-CEREBRAL; bilateral MMA embolization;  Surgeon: Ramon Che MD;  Location: [facility] OR [address] [place] [address];  Service: Neurosurgery;  Laterality: N/A;    CRANIOTOMY FOR EVACUATION OF SUBDURAL HEMATOMA Bilateral 6/4/2021    Procedure: CRANIOTOMY, FOR SUBDURAL HEMATOMA EVACUATION;  Surgeon: Maribell Michaud MD;  Location: [facility] OR [address] [place] [address];  Service: Neurosurgery;  Laterality: Bilateral;     Family History   Problem Relation Age of Onset    Heart attacks under age 50 Mother 48    Depression Mother     Atrial fibrillation Father     Hypertension Father     Cancer Father     Heart disease Father     Deep vein thrombosis Sister         Unprovoked    Stomach cancer Maternal Grandmother     Aneurysm Neg Hx     Dementia Neg Hx     Diabetes Neg Hx     Fainting Neg Hx     Kidney disease Neg Hx     Liver disease Neg Hx     Migraines Neg Hx     Neuropathy Neg Hx     Parkinsonism Neg Hx      Seizures Neg Hx     Stroke Neg Hx     Tremor Neg Hx      Social History     Tobacco Use    Smoking status: Never Smoker    Smokeless tobacco: Never Used   Substance Use Topics    Alcohol use: Not Currently    Drug use: Not Currently        Review of Systems:  Patient denies constitutional symptoms, cardiac symptoms, respiratory symptoms, GI symptoms.  The remainder of the musculoskeletal ROS is included in the HPI.      OBJECTIVE:     Physical Exam:  Gen:  No acute distress  CV:  Peripherally well-perfused.  Pulses 2+ bilaterally.  Lungs:  Normal respiratory effort.  Abdomen:  Soft, non-tender, non-distended  Head/Neck:  Normocephalic.  Atraumatic. No TTP, AROM and PROM intact without pain  Neuro:  CN intact without deficit, SILT throughout B/L Upper & Lower Extremities    MSK:    Bilateral UE:  - skin intact, no visible deformities.  - AROM and PROM of the intact  - AIN/PIN/Radial/Median/Ulnar Nerves assessed in isolation without deficit  - SILT throughout  - Radial & Ulnar arteries palpated 2+  - Capillary Refill <3s  - tender palpation over carpal tunnel bilaterally.  Positive Tinel, positive Phalen bilaterally.    Diagnostic Results:  X-rays bilateral wrist were ordered and images reviewed by me.  These showed bilateral CMC arthritis severe on the left.    ASSESSMENT/PLAN:     A/P: Shirin Evans is a 71 y.o. bilateral carpal tunnel syndrome right greater than left.  Severe on the right.  She has bilateral CMC arthritis but these are not bothering her at this time.    Plan:    - Discussed with the patient extensively about the different management options both conservative and surgical options.  Will set her up for bilateral carpal tunnel injections today.  She is in understanding that the right carpal tunnel syndrome is severe and she would like to proceed with surgery but she would like to wait a couple months.  Bilateral carpal tunnel injections today.  Follow-up in 6 weeks.      Bryon Ramos,  M.D. PGY5  Orthopaedic Surgery

## 2022-02-22 NOTE — PROCEDURES
Carpal Tunnel    Date/Time: 2/22/2022 9:15 AM  Performed by: Angelica Aguero MD  Authorized by: Angelica Aguero MD     Consent Done?:  Yes (Verbal)  Indications:  Pain  Timeout: prior to procedure the correct patient, procedure, and site was verified    Prep: patient was prepped and draped in usual sterile fashion      Local anesthesia used?: Yes    Anesthesia:  Local infiltration  Local anesthetic:  Lidocaine 1% without epinephrine  Location:  Wrist  Site:  R carpal tunnel  Ultrasonic Guidance for Needle Placement?: Yes    Approach:  Volar  Medications:  4 mg dexamethasone 4 mg/mL

## 2022-02-22 NOTE — PROGRESS NOTES
I have personally taken the history and examined this patient. I agree with the resident's note as stated above.  Plan for B injections CTS and bracing   Patient has significant pain at night mostly positive provocative examination for carpal tunnel right worse than left nerve testing performed and showed moderate on the left severe on the right we discussed carpal tunnel syndrome in great detail we also talked about what severe carpal tunnel means and moderate patient states that she really wants to continue recovery from her bilateral craniotomy she feels like she is improving every day her hands are staying the same but she wants to improve from physical therapy from her craniotomy she has been very happy with that result and she would like injections today they will come back in 6 weeks and discuss surgical options she understands the longer the pressures there that nerve changes may be permanent she is here with her daughter today they both understand they would like to just try the injections and bracing 1st

## 2022-03-03 ENCOUNTER — CLINICAL SUPPORT (OUTPATIENT)
Dept: REHABILITATION | Facility: HOSPITAL | Age: 72
End: 2022-03-03
Payer: MEDICARE

## 2022-03-03 DIAGNOSIS — M62.81 MUSCLE WEAKNESS (GENERALIZED): Primary | ICD-10-CM

## 2022-03-03 PROCEDURE — 97110 THERAPEUTIC EXERCISES: CPT | Mod: PO

## 2022-03-03 PROCEDURE — 97112 NEUROMUSCULAR REEDUCATION: CPT | Mod: PO

## 2022-03-03 NOTE — PROGRESS NOTES
[facility]   Physical Therapy Treatment Note     Name: Shirin Villafana [facility]  Clinic Number: [id number]    Therapy Diagnosis:   Encounter Diagnosis   Name Primary?    Muscle weakness (generalized) Yes     Physician: Maribell Michaud MD    Visit Date: 3/3/2022       Physician Orders: PT Eval and Treat   Medical Diagnosis from Referral:   S06.5X9A (ICD-10-CM) - Subdural hematoma   R29.898 (ICD-10-CM) - Weakness of foot, right   Z74.09,Z78.9 (ICD-10-CM) - Impaired mobility and ADLs         Evaluation Date: 12/16/2021  Authorization Period Expiration: 12/3/2022  Plan of Care Expiration: 3/16/2022  Updated Plan of Care Expiration: 3/14/22  Progress Note Due: 1/16/2021  Visit # / Visits authorized: 9/ 20( 11 total visits)   FOTO: 1/3  Precautions: Standard, Healing Craniotomy (6/4/2021)       PTA Visit #: 0/5     Time In: 1256     Time Out: 1356   Total Billable Time: 60    minutes    SUBJECTIVE     Pt reports: she thinks she will likely have to have carpal tunnel surgery. Injections she received last week only helped minimally.   She was compliant with her home exercise program  Response to previous treatment: no adverse effects   Functional change: ongoing    Pain: 3.5-10  Location: right thumb and second 2 fingers, head, L side of neck     OBJECTIVE     Objective Measures updated at progress report unless specified. See below.    Treatment     Shirin received the treatments listed below:      therapeutic exercises to develop strength, endurance, ROM, flexibility, posture and core stabilization for 45 minutes including:       X 15 minutes on SCI-FIT recumbent stepper at level 2.5 for improved B UE and LE muscular and CV endurance~ pt complains some of L neck pain and removes L arm from handle during the last 1-2 minutes.  Sp02 98 % and HR 97 after exercise    Pt instructed in and performs the following as final installment of HEP:    -2 x 10 B bicep curls against resistance of peach theraband~  no issues here  -2 x 10 unilateral shoulder flexion against resistance of peach theraband~ pt struggles with L side( hikes shoulder and unable to keep elbow straight)  -2 x 10 unilateral shoulder abduction against resistance of peach theraband~ pt struggles with L side( hikes shoulder and unable to keep elbow straight)    PT suggested modifications to latter 2 exercises as follows: stand with back against wall and perform with can of peas/beans instead of with theraband. PT also suggested pt can use 1 # dumbbell.    Quadruped:  2 x 5 bird dogs~ pt struggles to keep pelvis level, so PT modifies exercise by having pt lift limbs through smaller excursions of movement       neuromuscular re-education activities to improve: Balance, Coordination, Kinesthetic, Sense, Proprioception and Posture for 15 minutes. The following activities were included:    Pilates chair:  2 x 10 B LE hip and knee flex/ext, working foot pad up and down, 2 black springs and 1 white spring, no UE support, CGA       Propelling scooter:  2 x 100 feet with L foot on scooter and R foot propelling, min A  2 x 100 feet with R foot on scooter and L foot propelling, min A    1 x 10 B LE forward/backward step ups/downs on soft side of large Bosu, no UE support, CGA  1 x 5 B LE lateral( R LE leads) step ups/downs on soft side of large Bosu, no UE support, CGA to slight min A  1 x 5 B LE lateral( L LE leads) step ups/downs on soft side of large Bosu, no UE support, CGA to slight min A      therapeutic activities to improve functional performance for 0 minutes, including:          Patient Education and Home Exercises     Home Exercises Provided and Patient Education Provided     Education provided:   - 12/28/21: PT provided B upper trap stretch to help pt manage L neck pain. Pt was also provided a schedule slip for the month of January.  -1/6/22: PT provided pt a schedule slip for the month of February.  -2/8/22: PT and pt discussed pt's progress and pt is  concerned about potential discharge on 2/22/22. PT indicated he would be willing to test pt next visit for objective progress and also add 2 additional visits to her current schedule.    Written Home Exercises Provided: yes. Exercises were reviewed and Shirin was able to demonstrate them prior to the end of the session.  Shirin demonstrated good  understanding of the education provided. See EMR under Patient Instructions for exercises provided during therapy sessions    ASSESSMENT     Shirin tolerated her therapy session fairly today due to some pain to the L side of her neck. This was an issue when she was performing on the recumbent stepper and she had to remove her L hand during the final 1-2 minutes of the exercise. PT gave pt the option of either upright bicycle or stepper and pt chose stepper. Shirin also struggled to perform L unilateral shoulder flexion and abduction with peach theraband. Therefore, PT modified exercise and suggested she use a can of peas/beans instead of band. Pt also struggled to perform 2 x 5 bird dogs, so PT modified this exercise so that pt will perform with smaller excursions of movement. The remaining time was spent working on various balance tasks. Pt increased her number of sets on the Pilates Box when performing modified SLS. However, with step ups on Bosu and propelling scooter, she verbalized several times how she needs to improve in these areas. Pt continues to compare her current status with that of her former status prior to surgery. This is likely why pt feels she is being discharged from therapy prematurely. PT feels pt is very functional and is likely to have minor complaints and numerous aches/pains for the foreseeable future. There seems to be a certain self-limiting nature here. Pt remains appropriate for 1 x weekly physical therapy sessions to address her current deficits. She will be discharged following her visit next week on 3/10/22.        Shirin Is progressing well  "towards her goals.   Pt prognosis is Good.     Pt will continue to benefit from skilled outpatient physical therapy to address the deficits listed in the problem list box on initial evaluation, provide pt/family education and to maximize pt's level of independence in the home and community environment.     Pt's spiritual, cultural and educational needs considered and pt agreeable to plan of care and goals.     Anticipated barriers to physical therapy: Etiology of Symptoms, Scheduling    Goals:     Short Term Goals:  6 weeks  1. Pt. to demonstrate increased strength for right lower extremity to 5/5  to increase stability during community ambulation (progressing, not met)  2.  Pt to demonstrate increased strength for right upper extremity to 5/5 in order to perform functional activities (delete this goal, 2/15/22)  3. Pt. will improve Single Leg balance test score to >25 seconds in order to perform safe transfers and for patient to be in low/moderate risk for falls category (MET, 1/25/22, partially met on 2/15/22)  4. Pt will improve 6MWT endurance test score to ambulate safely in community~ Revise this goal to state " pt to perform 6 minute walk test to establish baseline level of activity tolerance," 12/28/21  5. Pt will initiate home exercise program to improve strength, flexibility, endurance, mobility & balance to return pt to PLOF (MET, 1/25/22)  6.. Pt will tolerate 10 min or greater of time in light-->moderate intensity cardio (I.e. Bike, NuStep) to improve endurance to assist in community ambulation (MET, 1/20/22)  7. Pt to demonstrate tandem stance 30 sec or greater w/out UE support in order to improve balance to progress to singe leg stance to decrease fall risk in performance of ADL's ~ delete this goal, 12/28/21  8. ( NEW GOAL, 12/28/21): pt to improve FGA score to 24/30 for improved community ambulation and decreased fall risk (MET, 1/25/22)  9. ( NEW GOAL, 12/28/21): pt to improve her score on condition " 4 of the MEENA to 6 seconds for improved balance in tight spaces with  low/eliminated vision (MET, 1/25/22)  10.( NEW GOAL, 1/6/22): pt to improve her 6 minute walk test distance to [id number] feet to demonstrate increased activity tolerance( MET, 1/25/22)       Long Term Goals:  8-12 weeks( established on 12/28/21)   1. Pt will improve Single Leg balance test score to 30 seconds or > in order to perform safe transfers and for patient to be in low/moderate risk for falls category (MET, 1/25/22)  2. Pt will be independent in finalized HEP to help maintain potential gains in PT(progressing, not met)  3. Pt will improve FGA score to 26/30 for improved community ambulation and decreased fall risk (MET, 1/25/22, NOT MET, 2/15/22)  4. Pt will  tolerate 15 min or greater of time in light-->moderate intensity cardio (I.e. Bike, NuStep) to improve endurance to assist in community ambulation (MET, 3/3/22)  5. Pt to improve her score on condition 4 of the MEENA to 9 seconds for improved balance in tight spaces with low/eliminated vision(MET, 1/25/22)  6.( NEW GOAL, 1/6/22): pt to improve her 6 minute walk test distance to [id number] feet to demonstrate increased activity tolerance( progressing, not met) ~ Revise this goal slightly to 7622-2043 feet, 2/15/22      PLAN       Continue to progress pt with higher level balance, strengthening and endurance activities.      Abraham Singh, PT   3/3/2022

## 2022-03-09 NOTE — PROGRESS NOTES
[facility]   Physical Therapy Treatment Note and Discharge Summary    [facility]  Cl[facility] Number: [id number]    Therapy Diagnosis:   Encounter Diagnosis   Name Primary?    Muscle weakness (generalized) Yes     Physician: Maribell Michaud MD    Visit Date: 3/10/2022       Physician Orders: PT Eval and Treat   Medical Diagnosis from Referral:   S06.5X9A (ICD-10-CM) - Subdural hematoma   R29.898 (ICD-10-CM) - Weakness of foot, right   Z74.09,Z78.9 (ICD-10-CM) - Impaired mobility and ADLs         Evaluation Date: 12/16/2021  Authorization Period Expiration: 12/3/2022  Plan of Care Expiration: 3/16/2022  Updated Plan of Care Expiration: 3/14/22  Progress Note Due: 1/16/2021  Visit # / Visits authorized: 10/ 20( 12 total visits)   FOTO: 1/3  Precautions: Standard, Healing Craniotomy (6/4/2021)       PTA Visit #: 0/5     Time In: 1130      Time Out: 1220  Total Billable Time: 50    minutes    SUBJECTIVE     Pt reports: she has not had the best of weeks. She reports some difficulty with performing her exercises due to fatigue.   She was compliant with her home exercise program  Response to previous treatment: no adverse effects   Functional change: ongoing    Pain: 3/10  Location: right thumb and second 2 fingers, head, L side of neck     OBJECTIVE   Pt arrives to session today wearing R wrist splint.    Objective Measures updated at progress report unless specified. See below.    Treatment     Shirin received the treatments listed below:      therapeutic exercises to develop strength, endurance, ROM, flexibility, posture and core stabilization for 15 minutes including:      Lower Extremity Strength~tested in sitting  Right LE eval 1/25/22 2/15/22 3/10/22 Left LE eval 1/25/22 2/15/22 3/10/22   Hip flexion:  NT 4+/5 4+/5 4+/5 Hip flexion: NT 4/5 4/5 4+/5   Knee extension: 4/5 5/5 5/5 5/5 Knee extension: 5/5 5/5 5/5 5/5   Knee flexion: 4/5 5/5 5/5 5/5 Knee flexion: 5/5 5/5 5/5 5/5    Ankle dorsiflexion:  4+/5 5/5 5/5 5/5 Ankle dorsiflexion: 5/5 5/5 5/5 5/5   Ankle plantarflexion:  NT 5/5 5/5 5/5 Ankle plantarflexion: NT 5/5 5/5 5/5   Hip abduction: NT 4+/5 4+/5 5/5 Hip abduction: NT 4+/5 4+/5 5/5   Hip adduction: NT 5/5 4-/5 4+/5 Hip adduction NT 5/5 4+/5 4+/5   Hip extension: NT 5/5 5/5 5/5 Hip extension: NT 5/5 5/5 5/5       1/6/22  Gait Assessment:  - AD used: none  - Assistance: independent  - 2 Minute Walk Test Distance: 496 feet  - 6 Minute Walk Test Distance: 1456 feet  - O2 sats after: 90 %  - HR after: 89     1/25/22  Gait Assessment:  - AD used: none  - Assistance: independent  - 2 Minute Walk Test Distance: 513 feet  - 6 Minute Walk Test Distance: 1505 feet  - O2 sats after: 85 %  - HR after: 109     2/15/22  Gait Assessment:  - AD used: none  - Assistance: independent  - 2 Minute Walk Test Distance: 507 feet  - 6 Minute Walk Test Distance: 1502 feet  - O2 sats after: 94 %  - HR after: 71      3/10/22  Gait Assessment:  - AD used: none  - Assistance: independent  - 2 Minute Walk Test Distance: 513 feet  - 6 Minute Walk Test Distance: 1546 feet  - O2 sats after: 95 %  - HR after: 80                    neuromuscular re-education activities to improve: Balance, Coordination, Kinesthetic, Sense, Proprioception and Posture for 35 minutes. The following activities were included:        Evaluation 12/28/22 1/25/22 2/15/22 3/10/22   Single Limb Stance R LE 20 seconds  (<10 sec = HIGH FALL RISK) 19 seconds  (<10 sec = HIGH FALL RISK) 30 seconds  (<10 sec = HIGH FALL RISK) 20 seconds  (<10 sec = HIGH FALL RISK) 28 seconds  (<10 sec = HIGH FALL RISK)   Single Limb Stance L LE 20 seconds  (<10 sec = HIGH FALL RISK) 25 seconds  (<10 sec = HIGH FALL RISK) 30 seconds  (<10 sec = HIGH FALL RISK) 30 seconds-mild instability  (<10 sec = HIGH FALL RISK) 30 seconds  (<10 sec = HIGH FALL RISK)       Functional Gait Assessment:   1. Gait on level surface =  3~ 5 seconds to travel 6 m              (3)  Normal: less than 5.5 sec, no A.D., no imbalance, normal gait pattern, deviates< 6in              (2) Mild impairment: 7-5.6 sec, uses A.D., mild gait deviations, or deviates 6-10 in              (1) Moderate impairment: > 7 sec, slow speed, imbalance, deviates 10-15 in.              (0) Severe impairment: needs assist, deviates >15 in, reach/touch wall  2. Change in Gait Speed = 3              (3) Normal: smooth change w/o loss of balance or gait deviation, deviates < 6 in, significant difference between speeds              (2) Mild impairment: changes speed, but demonstrates mild gait deviations, deviates 6-10 in, OR no deviations but unable to significantly speed, OR uses A.D.              (1) Moderate impairment: minor changes to speed, OR changes speed w/ significant deviations, deviates 10-15 in, OR  Changes speed , but loses balance & recovers              (0) Severe impairment: cannot change speed, deviates >15 in, or loses balance & needs assist  3. Gait with horizontal head turns  = 2              (3) Normal: no change in gait, deviates <6 in              (2) Mild impairment: slight change in speed, deviates 6-10 in, OR uses A.D.              (1) Moderate impairment: moderate change in speed, deviates 10-15 in              (0) Severe impairment: severe disruption of gait, deviates >15in  4. Gait with vertical head turns = 3              (3) Normal: no change in gait, deviates <6 in              (2) Mild impairment: slight change in speed, deviates 6-10 in OR uses A.D.              (1) Moderate impairment: moderate change in speed, deviates 10-15 in              (0) Severe impairment: severe disruption of gait, deviates >15 in  5. Gait with pivot turns = 3              (3) Normal: performs safely in 3 sec, no LOB              (2) Mild impairment: performs in >3 sec & no LOB, OR turns safely & requires several steps to regain LOB              (1) Moderate impairment: turns slow, OR requires several small  steps for balance following turn & stop              (0) Severe impairment: cannot turn safely, needs assist  6. Step over obstacle = 3              (3) Normal: steps over 2 stacked boxes w/o change in speed or LOB              (2) Mild impairment: able to step over 1 box w/o change in speed or LOB              (1) Moderate impairment: steps over 1 box but must slow down, may require VC              (0) Severe impairment: cannot perform w/o assist  7. Gait with Narrow ALISSA = 2              (3) Normal: 10 steps no staggering              (2) Mild impairment: 7-9 steps              (1) Moderate impairment: 4-7 steps              (0) Severe impairment: < 4 steps or cannot perform w/o assist  8. Gait with eyes closed = 2              (3) Normal: < 7 sec, no A.D., no LOB, normal gait pattern, deviates <6 in              (2) Mild impairment: 7.1-9 sec, mild gait deviations, deviates 6-10 in              (1) Moderate impairment: > 9 sec, abnormal pattern, LOB, deviates 10-15 in              (0) Severe impairment: cannot perform w/o assist, LOB, deviates >15in  9. Ambulating Backwards = 2              (3) Normal: no A.D., no LOB, normal gait pattern, deviates <6in              (2) Mild impairment: uses A.D., slower speed, mild gait deviations, deviates 6-10 in              (1) Moderate impairment: slow speed, abnormal gait pattern, LOB, deviates 10-15 in              (0) Severe impairment: severe gait deviations or LOB, deviates >15in  10. Steps = 3              (3) Normal: alternating feet, no rail              (2) Mild Impairment: alternating feet, uses rail              (1) Moderate impairment: step-to, uses rail              (0) Severe impairment: cannot perform safely     Score 22/30 on 12/28/21; 26/30 on 1/25/22; 24/30 on 2/15/22; 26/30 on 3/10/22     Score:   <22/30 fall risk   <20/30 fall risk in older adults   <18/30 fall risk in Parkinsons         2/15/22  Postural control:  [place] SENSORY TESTING:  (P= Pass, F=  "Fail; note any sway; hold each position for 30")  Condition 1: (firm surface/feet together/eyes open) P  Condition 2: (firm surface/feet together/eyes closed) P  Condition 3: (firm surface/feet in tandem/eyes open) P~  very slight sway  Condition 4: (firm surface/feet in tandem/eyes closed) F, 12"  Condition 5: (soft surface/feet together/eyes open) P  Condition 6: (soft surface/feet together/eyes closed) P~ mild sway  Condition 7: (Fakuda step test), measure distance varied from center starting position NT      3/10/22  Postural control:  [place] SENSORY TESTING:  (P= Pass, F= Fail; note any sway; hold each position for 30")  Condition 1: (firm surface/feet together/eyes open) P  Condition 2: (firm surface/feet together/eyes closed) P  Condition 3: (firm surface/feet in tandem/eyes open) P  Condition 4: (firm surface/feet in tandem/eyes closed) F, 7"~ multiple attempts  Condition 5: (soft surface/feet together/eyes open) P  Condition 6: (soft surface/feet together/eyes closed) P~ mild sway  Condition 7: (Fakuda step test), measure distance varied from center starting position NT        Endurance Assessment:    Evaluation 1/25/22 2/15/22 3/10/22   30 second Chair Rise  (adults > 61 y/o) 10 completed with no arms 13 completed with no arms 12 completed with no arms 11 completed with no arms                 therapeutic activities to improve functional performance for 0 minutes, including:          Patient Education and Home Exercises     Home Exercises Provided and Patient Education Provided     Education provided:   - 12/28/21: PT provided B upper trap stretch to help pt manage L neck pain. Pt was also provided a schedule slip for the month of January.  -1/6/22: PT provided pt a schedule slip for the month of February.  -2/8/22: PT and pt discussed pt's progress and pt is concerned about potential discharge on 2/22/22. PT indicated he would be willing to test pt next visit for objective progress and also add 2 additional " "visits to her current schedule.  -3/10/22: PT discussed pt's test results and his recommendation that she look into the [facility] Program. PT asked if pt is interested in a driving evaluation and she indicated she does not think this is necessary. PT informed her that as long as she has the OK from her MD, she should begin with driving for a few minutes in an empty parking lot one Sunday afternoon.    Written Home Exercises Provided: yes. Exercises were reviewed and Shirin was able to demonstrate them prior to the end of the session.  Shirin demonstrated good  understanding of the education provided. See EMR under Patient Instructions for exercises provided during therapy sessions    PHYSICAL THERAPY DISCHARGE SUMMARY     Status Towards Goals Met:     Shirin made good progress for the most part during her therapy episode. She demonstrated improvements with strength, balance and endurance. Unfortunately, the last 2 assessments have resulted in some inconsistent test results. PT feels some of this has to do with pt's tendency to focus on more negative thoughts and she sometimes does not seem to enjoy or realize her gains. Once pt is convinced that " this has been a bad week", then her test results tend to reflect this. Today's results were mixed, with improvements noted in 6 minute walk test and FGA testing. She struggled more with condition # 4 of the MEENA and she only managed 11 reps during 30 second sit<> stand test. PT has provided pt many home exercises and she simply needs to continue to work on these. PT also recommended she walk regularly and talk to someone at [facility] regarding the Medical [facility]itness Program. She indicates she will follow through with these recommendations.      Goals:     Short Term Goals:  6 weeks  1. Pt. to demonstrate increased strength for right lower extremity to 5/5  to increase stability during community ambulation (NOT MET, 3/10/22)  2.  Pt to demonstrate increased " "strength for right upper extremity to 5/5 in order to perform functional activities (delete this goal, 2/15/22)  3. Pt. will improve Single Leg balance test score to >25 seconds in order to perform safe transfers and for patient to be in low/moderate risk for falls category (MET, 1/25/22, partially met on 2/15/22, MET, 3/10/22)  4. Pt will improve 6MWT endurance test score to ambulate safely in community~ Revise this goal to state " pt to perform 6 minute walk test to establish baseline level of activity tolerance," 12/28/21  5. Pt will initiate home exercise program to improve strength, flexibility, endurance, mobility & balance to return pt to PLOF (MET, 1/25/22)  6.. Pt will tolerate 10 min or greater of time in light-->moderate intensity cardio (I.e. Bike, NuStep) to improve endurance to assist in community ambulation (MET, 1/20/22)  7. Pt to demonstrate tandem stance 30 sec or greater w/out UE support in order to improve balance to progress to singe leg stance to decrease fall risk in performance of ADL's ~ delete this goal, 12/28/21  8. ( NEW GOAL, 12/28/21): pt to improve FGA score to 24/30 for improved community ambulation and decreased fall risk (MET, 1/25/22)  9. ( NEW GOAL, 12/28/21): pt to improve her score on condition 4 of the MEENA to 6 seconds for improved balance in tight spaces with  low/eliminated vision (MET, 1/25/22, 3/10/22)  10.( NEW GOAL, 1/6/22): pt to improve her 6 minute walk test distance to 0550-3611 feet to demonstrate increased activity tolerance( MET, 1/25/22)       Long Term Goals:  8-12 weeks( established on 12/28/21)   1. Pt will improve Single Leg balance test score to 30 seconds or > in order to perform safe transfers and for patient to be in low/moderate risk for falls category (MET, 1/25/22, partially met on 2/15/22, 3/10/22)  2. Pt will be independent in finalized HEP to help maintain potential gains in PT(MET, 3/10/22)  3. Pt will improve FGA score to 26/30 for improved " community ambulation and decreased fall risk (MET, 1/25/22, NOT MET, 2/15/22, MET, 3/10/22)  4. Pt will  tolerate 15 min or greater of time in light-->moderate intensity cardio (I.e. Bike, NuStep) to improve endurance to assist in community ambulation (MET, 3/3/22)  5. Pt to improve her score on condition 4 of the MEENA to 9 seconds for improved balance in tight spaces with low/eliminated vision(MET, 1/25/22, NOT MET 3/10/22)  6.( NEW GOAL, 1/6/22): pt to improve her 6 minute walk test distance to 6865-9770 feet to demonstrate increased activity tolerance( progressing, not met) ~ Revise this goal slightly to 1903-2172 feet, 2/15/22~NOT MET, 3/10/22( pt only fell short here by 4 feet!)              Goals Not achieved and why:       Pt did not consistently meet LTG for B SLS at 30 seconds( though she has met this goal in the past). She fell short of her revised long term goal for 6 minute walk test by only 4 feet! Pt's R LE strength is not 5/5 across the board, due to scores of 4+/5 for hip flexion and adduction. As mentioned in pt's previous note, PT feels pt will likely continue to verbalize complaints regarding pain, issues with sensations in her head and varying levels of fatigue. However, she has been very functional in her personal life, even attending some recent [employer] parades.      Discharge reason : Patient has maximized benefit from PT at this time    PLAN   This patient is discharged from Outpatient Physical Therapy Services.     Abraham Singh, PT  03/10/2022

## 2022-03-10 ENCOUNTER — CLINICAL SUPPORT (OUTPATIENT)
Dept: REHABILITATION | Facility: HOSPITAL | Age: 72
End: 2022-03-10
Payer: MEDICARE

## 2022-03-10 DIAGNOSIS — M62.81 MUSCLE WEAKNESS (GENERALIZED): Primary | ICD-10-CM

## 2022-03-10 PROCEDURE — 97110 THERAPEUTIC EXERCISES: CPT | Mod: PO

## 2022-03-10 PROCEDURE — 97112 NEUROMUSCULAR REEDUCATION: CPT | Mod: PO

## 2022-03-15 ENCOUNTER — TELEPHONE (OUTPATIENT)
Dept: NEUROLOGY | Facility: CLINIC | Age: 72
End: 2022-03-15
Payer: MEDICARE

## 2022-03-15 NOTE — TELEPHONE ENCOUNTER
----- Message from Luz Maria Lima MD sent at 3/14/2022  4:08 PM CDT -----  My next available will not be for months.   Joselin can you get this patient in with resident clinic?    Thanks    Luz Maria Lima   ----- Message -----  From: Weston Buitrago MA  Sent: 3/14/2022   3:03 PM CDT  To: Luz Maria Lima MD    Spoke to the pt and she is asking to see you because she is being release from her Neurosurgeon. She saw Abril back in December for Headache. Pt had craniotomies back in July of last year. She is asking to come in and see you. She would be a new pt. Please advise   ----- Message -----  From: Sophia Shankar  Sent: 3/14/2022   2:37 PM CDT  To: [facility]era Staff    Type:  Appointment Request    Caller is requesting an appointment.     Name of Caller: Lachelle Young   When is the first available appointment? NA   Symptoms: f/u   Best Call Back Number: [phone]   Additional Information:  Portal message       Message  Appointment Request From: Shirin Evans  With Provider: Luz Maria Lima  Preferred Date Range: 3/29/2022 - 4/5/2022  Preferred Times: Any Time    Reason for visit: Neurology follow up per Neurosurgeon recommendation    Comments:  This message is being sent by Lachelle Young on behalf of Shirin Evans.

## 2022-03-20 ENCOUNTER — PATIENT MESSAGE (OUTPATIENT)
Dept: NEUROSURGERY | Facility: CLINIC | Age: 72
End: 2022-03-20
Payer: MEDICARE

## 2022-03-21 ENCOUNTER — TELEPHONE (OUTPATIENT)
Dept: NEUROSURGERY | Facility: CLINIC | Age: 72
End: 2022-03-21
Payer: MEDICARE

## 2022-03-21 DIAGNOSIS — S06.5XAA SUBDURAL HEMATOMA: Primary | ICD-10-CM

## 2022-03-22 ENCOUNTER — TELEPHONE (OUTPATIENT)
Dept: PRIMARY CARE CLINIC | Facility: CLINIC | Age: 72
End: 2022-03-22
Payer: MEDICARE

## 2022-03-22 NOTE — TELEPHONE ENCOUNTER
----- Message from Comfort Roberto sent at 3/22/2022  2:04 PM CDT -----  Contact: srinivas dominguez daughter [phone]  Srinivas pts daughter is calling in regards to the pt she states that Dr Martinez recommended the pt to see you I did let her know that dr Graf and she is asking for her mom to be seen by you all please advise and give return call

## 2022-03-30 ENCOUNTER — TELEPHONE (OUTPATIENT)
Dept: NEUROLOGY | Facility: CLINIC | Age: 72
End: 2022-03-30
Payer: MEDICARE
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
## 2022-04-01 NOTE — TELEPHONE ENCOUNTER
Pt scheduled for July. Mailed appt letter.      Quality 111:Pneumonia Vaccination Status For Older Adults: Pneumococcal Vaccination Previously Received Quality 226: Preventive Care And Screening: Tobacco Use: Screening And Cessation Intervention: Patient screened for tobacco and never smoked Quality 110: Preventive Care And Screening: Influenza Immunization: Influenza Immunization Administered during Influenza season Detail Level: Generalized

## 2022-04-08 ENCOUNTER — TELEPHONE (OUTPATIENT)
Dept: ORTHOPEDICS | Facility: CLINIC | Age: 72
End: 2022-04-08
Payer: MEDICARE

## 2022-04-08 NOTE — TELEPHONE ENCOUNTER
Spoke with patient to confirm her appt.on 4/12/2022 at 9:00 am with Dr. Aguero. Patient verbally expressed understanding of date, time and location.

## 2022-04-11 ENCOUNTER — PATIENT OUTREACH (OUTPATIENT)
Dept: ADMINISTRATIVE | Facility: OTHER | Age: 72
End: 2022-04-11
Payer: MEDICARE

## 2022-04-12 ENCOUNTER — OFFICE VISIT (OUTPATIENT)
Dept: ORTHOPEDICS | Facility: CLINIC | Age: 72
End: 2022-04-12
Payer: MEDICARE

## 2022-04-12 VITALS — HEIGHT: 63 IN | WEIGHT: 157 LBS | BODY MASS INDEX: 27.82 KG/M2

## 2022-04-12 DIAGNOSIS — M79.641 BILATERAL HAND PAIN: ICD-10-CM

## 2022-04-12 DIAGNOSIS — G56.01 CARPAL TUNNEL SYNDROME OF RIGHT WRIST: Primary | ICD-10-CM

## 2022-04-12 DIAGNOSIS — M79.642 BILATERAL HAND PAIN: ICD-10-CM

## 2022-04-12 PROCEDURE — 1159F PR MEDICATION LIST DOCUMENTED IN MEDICAL RECORD: ICD-10-PCS | Mod: CPTII,S$GLB,, | Performed by: ORTHOPAEDIC SURGERY

## 2022-04-12 PROCEDURE — 99214 OFFICE O/P EST MOD 30 MIN: CPT | Mod: S$GLB,,, | Performed by: ORTHOPAEDIC SURGERY

## 2022-04-12 PROCEDURE — 1101F PR PT FALLS ASSESS DOC 0-1 FALLS W/OUT INJ PAST YR: ICD-10-PCS | Mod: CPTII,S$GLB,, | Performed by: ORTHOPAEDIC SURGERY

## 2022-04-12 PROCEDURE — 3008F BODY MASS INDEX DOCD: CPT | Mod: CPTII,S$GLB,, | Performed by: ORTHOPAEDIC SURGERY

## 2022-04-12 PROCEDURE — 1125F PR PAIN SEVERITY QUANTIFIED, PAIN PRESENT: ICD-10-PCS | Mod: CPTII,S$GLB,, | Performed by: ORTHOPAEDIC SURGERY

## 2022-04-12 PROCEDURE — 1101F PT FALLS ASSESS-DOCD LE1/YR: CPT | Mod: CPTII,S$GLB,, | Performed by: ORTHOPAEDIC SURGERY

## 2022-04-12 PROCEDURE — 99999 PR PBB SHADOW E&M-EST. PATIENT-LVL III: CPT | Mod: PBBFAC,,, | Performed by: ORTHOPAEDIC SURGERY

## 2022-04-12 PROCEDURE — 3288F PR FALLS RISK ASSESSMENT DOCUMENTED: ICD-10-PCS | Mod: CPTII,S$GLB,, | Performed by: ORTHOPAEDIC SURGERY

## 2022-04-12 PROCEDURE — 3008F PR BODY MASS INDEX (BMI) DOCUMENTED: ICD-10-PCS | Mod: CPTII,S$GLB,, | Performed by: ORTHOPAEDIC SURGERY

## 2022-04-12 PROCEDURE — 99999 PR PBB SHADOW E&M-EST. PATIENT-LVL III: ICD-10-PCS | Mod: PBBFAC,,, | Performed by: ORTHOPAEDIC SURGERY

## 2022-04-12 PROCEDURE — 99214 PR OFFICE/OUTPT VISIT, EST, LEVL IV, 30-39 MIN: ICD-10-PCS | Mod: S$GLB,,, | Performed by: ORTHOPAEDIC SURGERY

## 2022-04-12 PROCEDURE — 1125F AMNT PAIN NOTED PAIN PRSNT: CPT | Mod: CPTII,S$GLB,, | Performed by: ORTHOPAEDIC SURGERY

## 2022-04-12 PROCEDURE — 1159F MED LIST DOCD IN RCRD: CPT | Mod: CPTII,S$GLB,, | Performed by: ORTHOPAEDIC SURGERY

## 2022-04-12 PROCEDURE — 3288F FALL RISK ASSESSMENT DOCD: CPT | Mod: CPTII,S$GLB,, | Performed by: ORTHOPAEDIC SURGERY

## 2022-04-12 NOTE — PROGRESS NOTES
I have personally taken the history and examined this patient. I agree with the resident's note as stated above.  Patient is here today to discuss carpal tunnel release on the right side she states her injections helped for about 5 days she was able to open jars once again in now she is back to her base she is ready for surgery she is doing well from her craniotomy as she states her still something she can not do like drive but otherwise she is doing much better we went over the surgery in great detail of note her CMC joints are doing fine she has no pain we are not doing any kind of treatment for that

## 2022-04-12 NOTE — PROGRESS NOTES
Clinic Note  Orthopaedics    SUBJECTIVE:     History of Present Illness:  Patient is a 71 y.o. female history of recent craniotomy here for bilateral carpal tunnel syndrome right greater than left.  Severe on the right side.  She is having constant numbness tingling in the right hand.  Little bit less on the left.  Primarily in the 1st 3 digits bilaterally.  She is doing better from a cranial standpoint.  Her surgery was in June and is feeling better now.  She is a caretaker couple times a week.  She is hesitant of surgery given that she had this recent large cranial event.  She has had no conservative management yet.    Review of patient's allergies indicates:  No Known Allergies    Past Medical History:   Diagnosis Date    Carpal tunnel syndrome, bilateral 2/3/2022    Headache     Hypertension     Movement disorder     Pulmonary embolism     Syncope and collapse     Weakness of right lower extremity 6/3/2021     Past Surgical History:   Procedure Laterality Date    CEREBRAL ANGIOGRAM N/A 6/6/2021    Procedure: ANGIOGRAM-CEREBRAL; bilateral MMA embolization;  Surgeon: Ramon Che MD;  Location: [facility] OR [address] [place] [address];  Service: Neurosurgery;  Laterality: N/A;    CRANIOTOMY FOR EVACUATION OF SUBDURAL HEMATOMA Bilateral 6/4/2021    Procedure: CRANIOTOMY, FOR SUBDURAL HEMATOMA EVACUATION;  Surgeon: Maribell Michaud MD;  Location: [facility] OR [address] [place] [address];  Service: Neurosurgery;  Laterality: Bilateral;     Family History   Problem Relation Age of Onset    Heart attacks under age 50 Mother 48    Depression Mother     Atrial fibrillation Father     Hypertension Father     Cancer Father     Heart disease Father     Deep vein thrombosis Sister         Unprovoked    Stomach cancer Maternal Grandmother     Aneurysm Neg Hx     Dementia Neg Hx     Diabetes Neg Hx     Fainting Neg Hx     Kidney disease Neg Hx     Liver disease Neg Hx     Migraines Neg Hx     Neuropathy Neg Hx     Parkinsonism Neg Hx      Seizures Neg Hx     Stroke Neg Hx     Tremor Neg Hx      Social History     Tobacco Use    Smoking status: Never Smoker    Smokeless tobacco: Never Used   Substance Use Topics    Alcohol use: Not Currently    Drug use: Not Currently        Review of Systems:  Patient denies constitutional symptoms, cardiac symptoms, respiratory symptoms, GI symptoms.  The remainder of the musculoskeletal ROS is included in the HPI.      OBJECTIVE:     Physical Exam:  Gen:  No acute distress  CV:  Peripherally well-perfused.  Pulses 2+ bilaterally.  Lungs:  Normal respiratory effort.  Abdomen:  Soft, non-tender, non-distended  Head/Neck:  Normocephalic.  Atraumatic. No TTP, AROM and PROM intact without pain  Neuro:  CN intact without deficit, SILT throughout B/L Upper & Lower Extremities    MSK:    Bilateral UE:  - skin intact, no visible deformities.  - AROM and PROM of the intact  - AIN/PIN/Radial/Median/Ulnar Nerves assessed in isolation without deficit  - SILT throughout  - Radial & Ulnar arteries palpated 2+  - Capillary Refill <3s  - tender palpation over carpal tunnel bilaterally.  Positive Tinel, positive Phalen bilaterally.    Diagnostic Results:  X-rays bilateral wrist were ordered and images reviewed by me.  These showed bilateral CMC arthritis severe on the left.    ASSESSMENT/PLAN:     A/P: Shirin Evans is a 71 y.o. bilateral carpal tunnel syndrome right greater than left.  Severe on the right.  She has bilateral CMC arthritis but these are not bothering her at this time.    Plan:    Injections at last appt helped her CTS for about 5 days. Symptoms have fully returned. She has exhausted conservative management for the past 4 months now. She would like to proceed with surgery at this time. Consented today in clinic. Will find an appropriate surgery date.

## 2022-04-19 ENCOUNTER — TELEPHONE (OUTPATIENT)
Dept: NEUROLOGY | Facility: CLINIC | Age: 72
End: 2022-04-19
Payer: MEDICARE
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
## 2022-05-04 ENCOUNTER — OFFICE VISIT (OUTPATIENT)
Dept: NEUROLOGY | Facility: CLINIC | Age: 72
End: 2022-05-04
Payer: MEDICARE

## 2022-05-04 VITALS
HEIGHT: 63 IN | HEART RATE: 67 BPM | SYSTOLIC BLOOD PRESSURE: 120 MMHG | BODY MASS INDEX: 29.36 KG/M2 | DIASTOLIC BLOOD PRESSURE: 80 MMHG | WEIGHT: 165.69 LBS

## 2022-05-04 DIAGNOSIS — G89.29 CHRONIC NONINTRACTABLE HEADACHE, UNSPECIFIED HEADACHE TYPE: Primary | ICD-10-CM

## 2022-05-04 DIAGNOSIS — R51.9 CHRONIC NONINTRACTABLE HEADACHE, UNSPECIFIED HEADACHE TYPE: Primary | ICD-10-CM

## 2022-05-04 PROCEDURE — 3074F SYST BP LT 130 MM HG: CPT | Mod: CPTII,S$GLB,, | Performed by: PSYCHIATRY & NEUROLOGY

## 2022-05-04 PROCEDURE — 1126F PR PAIN SEVERITY QUANTIFIED, NO PAIN PRESENT: ICD-10-PCS | Mod: CPTII,S$GLB,, | Performed by: PSYCHIATRY & NEUROLOGY

## 2022-05-04 PROCEDURE — 3079F DIAST BP 80-89 MM HG: CPT | Mod: CPTII,S$GLB,, | Performed by: PSYCHIATRY & NEUROLOGY

## 2022-05-04 PROCEDURE — 1101F PT FALLS ASSESS-DOCD LE1/YR: CPT | Mod: CPTII,S$GLB,, | Performed by: PSYCHIATRY & NEUROLOGY

## 2022-05-04 PROCEDURE — 3008F BODY MASS INDEX DOCD: CPT | Mod: CPTII,S$GLB,, | Performed by: PSYCHIATRY & NEUROLOGY

## 2022-05-04 PROCEDURE — 99999 PR PBB SHADOW E&M-EST. PATIENT-LVL II: CPT | Mod: PBBFAC,,, | Performed by: PSYCHIATRY & NEUROLOGY

## 2022-05-04 PROCEDURE — 3079F PR MOST RECENT DIASTOLIC BLOOD PRESSURE 80-89 MM HG: ICD-10-PCS | Mod: CPTII,S$GLB,, | Performed by: PSYCHIATRY & NEUROLOGY

## 2022-05-04 PROCEDURE — 3074F PR MOST RECENT SYSTOLIC BLOOD PRESSURE < 130 MM HG: ICD-10-PCS | Mod: CPTII,S$GLB,, | Performed by: PSYCHIATRY & NEUROLOGY

## 2022-05-04 PROCEDURE — 99214 OFFICE O/P EST MOD 30 MIN: CPT | Mod: S$GLB,,, | Performed by: PSYCHIATRY & NEUROLOGY

## 2022-05-04 PROCEDURE — 3288F PR FALLS RISK ASSESSMENT DOCUMENTED: ICD-10-PCS | Mod: CPTII,S$GLB,, | Performed by: PSYCHIATRY & NEUROLOGY

## 2022-05-04 PROCEDURE — 3008F PR BODY MASS INDEX (BMI) DOCUMENTED: ICD-10-PCS | Mod: CPTII,S$GLB,, | Performed by: PSYCHIATRY & NEUROLOGY

## 2022-05-04 PROCEDURE — 99214 PR OFFICE/OUTPT VISIT, EST, LEVL IV, 30-39 MIN: ICD-10-PCS | Mod: S$GLB,,, | Performed by: PSYCHIATRY & NEUROLOGY

## 2022-05-04 PROCEDURE — 3288F FALL RISK ASSESSMENT DOCD: CPT | Mod: CPTII,S$GLB,, | Performed by: PSYCHIATRY & NEUROLOGY

## 2022-05-04 PROCEDURE — 1126F AMNT PAIN NOTED NONE PRSNT: CPT | Mod: CPTII,S$GLB,, | Performed by: PSYCHIATRY & NEUROLOGY

## 2022-05-04 PROCEDURE — 99999 PR PBB SHADOW E&M-EST. PATIENT-LVL II: ICD-10-PCS | Mod: PBBFAC,,, | Performed by: PSYCHIATRY & NEUROLOGY

## 2022-05-04 PROCEDURE — 1101F PR PT FALLS ASSESS DOC 0-1 FALLS W/OUT INJ PAST YR: ICD-10-PCS | Mod: CPTII,S$GLB,, | Performed by: PSYCHIATRY & NEUROLOGY

## 2022-05-04 NOTE — PROGRESS NOTES
"  Shirin Evans is a 71 y.o. year old female that  presents for HA evaluation. She is accompanied by her daughter.     HPI:    Mrs Evans has HTN, bilateral CTS, DVT, PE, and s/p bilateral craniotomies for evacuation of subdural hematoma on 6/4/21, followed by left MMA embolization on 6/6/21.  She indicated that overall she has been doing well except for persistence of head pain that occurs in different head areas as well as the base of the skull and upper neck in the L side.  Said that for the most part the HA are less frequent, although she still gets at least 2 or 3 HA every week, at times severe enough that she has to stop what she is doing and try to relax. The HA is not associated with nausea, vomiting, visual disturbances, confusion, focal weakness or numbness, language or speech impairment, worsened by physical activity, but can not pinpoint to anything that can trigger or improve the HA. The HA never wakes her up from sleep. Tylenol extra strength is usually capable of stopping that type of HA but she was using it quite frequently and was advised to stop it due to concern with potential liver toxicity. In addition, she told me that she has been on gabapentin 400 mg daily " but I don't think it is helping much when I have a HA".  No recent head or neck trauma, no fever.     Past Medical History:   Diagnosis Date    Carpal tunnel syndrome, bilateral 2/3/2022    Headache     Hypertension     Movement disorder     Pulmonary embolism     Syncope and collapse     Weakness of right lower extremity 6/3/2021     Social History     Socioeconomic History    Marital status: Single   Occupational History    Occupation: Retired    Tobacco Use    Smoking status: Never Smoker    Smokeless tobacco: Never Used   Substance and Sexual Activity    Alcohol use: Not Currently    Drug use: Not Currently    Sexual activity: Not Currently     Past Surgical History:   Procedure Laterality " "Date    CEREBRAL ANGIOGRAM N/A 6/6/2021    Procedure: ANGIOGRAM-CEREBRAL; bilateral MMA embolization;  Surgeon: Ramon Che MD;  Location: [facility] OR [address] [place] [address];  Service: Neurosurgery;  Laterality: N/A;    CRANIOTOMY FOR EVACUATION OF SUBDURAL HEMATOMA Bilateral 6/4/2021    Procedure: CRANIOTOMY, FOR SUBDURAL HEMATOMA EVACUATION;  Surgeon: Maribell Michaud MD;  Location: [facility] OR [address] [place] [address];  Service: Neurosurgery;  Laterality: Bilateral;     Family History   Problem Relation Age of Onset    Heart attacks under age 50 Mother 48    Depression Mother     Atrial fibrillation Father     Hypertension Father     Cancer Father     Heart disease Father     Deep vein thrombosis Sister         Unprovoked    Stomach cancer Maternal Grandmother     Aneurysm Neg Hx     Dementia Neg Hx     Diabetes Neg Hx     Fainting Neg Hx     Kidney disease Neg Hx     Liver disease Neg Hx     Migraines Neg Hx     Neuropathy Neg Hx     Parkinsonism Neg Hx     Seizures Neg Hx     Stroke Neg Hx     Tremor Neg Hx            Review of Systems  General ROS: negative for chills, fever or weight loss  Psychological ROS: negative for hallucination, depression or suicidal ideation  Ophthalmic ROS: negative for blurry vision, photophobia or eye pain  ENT ROS: negative for epistaxis, sore throat or rhinorrhea  Respiratory ROS: no cough, shortness of breath, or wheezing  Cardiovascular ROS: no chest pain or dyspnea on exertion  Gastrointestinal ROS: no abdominal pain, change in bowel habits, or black/ bloody stools  Genito-Urinary ROS: no dysuria, trouble voiding, or hematuria  Musculoskeletal ROS: negative for gait disturbance or muscular weakness  Neurological ROS: no syncope or seizures; no ataxia  Dermatological ROS: negative for pruritis, rash and jaundice      Physical Exam:  /80   Pulse 67   Ht 5' 3" (1.6 m)   Wt 75.1 kg (165 lb 10.8 oz)   LMP  (LMP Unknown)   BMI 29.35 kg/m²   General appearance: alert, cooperative, no " distress  Constitutional:Oriented to person, place, and time.appears well-developed and well-nourished.   HEENT: Normocephalic, atraumatic, neck symmetrical, no nasal discharge   Eyes: conjunctivae/corneas clear, PERRL, EOM's intact  Lungs: clear to auscultation bilaterally, no dullness to percussion bilaterally  Heart: regular rate and rhythm without rub; no displacement of the PMI   Abdomen: soft, non-tender; bowel sounds normoactive; no organomegaly  Extremities: extremities symmetric; no clubbing, cyanosis, or edema  Integument: Skin color, texture, turgor normal; no rashes; hair distrubution normal  Neurologic:   Mental status: alert and awake, oriented x 4, comprehension, naming, and repetition intact. No right to left confusion. Performs serial 7's without difficulty .No dysarthria.  CN 2-12: pupils 4 mm bilaterally, reactive to light. Fundi without papilledema. Visual fields full to confrontation. EOM full without nystagmus. Face sensation normal in all distributions. Face symmetric. Hearing grossly intact. Palate elevates well. Tongue midline without atrophy or fasciculations.  Motor: 5/5 all over  Sensory: intact in all modalities.  DTR's: 2+ all over.  Plantars: no tested.  Coordination: finger to nose and heel-knee-shin intact.  Gait: no ataxia or bradykinesia        LABS:    Complete Blood Count  Lab Results   Component Value Date    RBC 2.93 (L) 06/12/2021    HGB 9.5 (L) 06/12/2021    HCT 28.3 (L) 06/12/2021    MCV 97 06/12/2021    MCH 32.4 (H) 06/12/2021    MCHC 33.6 06/12/2021    RDW 12.3 06/12/2021     06/12/2021    MPV 10.1 06/12/2021    GRAN 2.5 06/12/2021    GRAN 58.9 06/12/2021    LYMPH 1.1 06/12/2021    LYMPH 25.4 06/12/2021    MONO 0.5 06/12/2021    MONO 11.7 06/12/2021    EOS 0.1 06/12/2021    BASO 0.03 06/12/2021    EOSINOPHIL 2.8 06/12/2021    BASOPHIL 0.7 06/12/2021    DIFFMETHOD Automated 06/12/2021       Comprehensive Metabolic Panel  Lab Results   Component Value Date      06/12/2021    BUN 9 06/12/2021    CREATININE 0.6 06/12/2021     06/12/2021    K 3.9 06/12/2021     06/12/2021    PROT 6.1 06/12/2021    ALBUMIN 3.0 (L) 06/12/2021    BILITOT 0.3 06/12/2021    AST 21 06/12/2021    ALKPHOS 68 06/12/2021    CO2 25 06/12/2021    ALT 13 06/12/2021    ANIONGAP 9 06/12/2021    EGFRNONAA >60.0 06/12/2021    ESTGFRAFRICA >60.0 06/12/2021       TSH  Lab Results   Component Value Date    TSH 1.516 11/11/2021         Assessment: pleasant 72 y/o with HTN, bilateral CTS, DVT, PE, and s/p bilateral craniotomies for evacuation of subdural hematoma on 6/4/21, followed by left MMA embolization on 6/6/21, presents for HA evaluation.  Neuro exam is non focal.  Likely mixed type HA.  Advised to keep a HA calendar x 4 weeks.  Continue gabapentin for HA prevention and recommended occasional use of Tylenol as abortive HA treatment.  Ample discussion with patient and daughter regardingHA red flags that should prompt immediate medical attention.       No diagnosis found.  There were no encounter diagnoses.      Plan:  1) Chronic mixed type HA: as above  2) L upper neck pain, likely occipital neuralgia  3) S/p bilateral craniotomies for evacuation of subdural hematoma on 6/4/21 and left MMA embolization on 6/6/21  4) Bilateral CTS  5) DVT (RESOLVED)  6) PE  No orders of the defined types were placed in this encounter.          Drake Pride MD

## 2022-05-16 ENCOUNTER — ANESTHESIA EVENT (OUTPATIENT)
Dept: SURGERY | Facility: HOSPITAL | Age: 72
End: 2022-05-16
Payer: MEDICARE

## 2022-05-16 NOTE — ANESTHESIA PAT ROS NOTE
Chart review complete. Patient's medical history reviewed.  OK to proceed at [facility].    Feroz Sosa MD   5/16/2022

## 2022-05-20 ENCOUNTER — TELEPHONE (OUTPATIENT)
Dept: ORTHOPEDICS | Facility: CLINIC | Age: 72
End: 2022-05-20
Payer: MEDICARE

## 2022-05-20 DIAGNOSIS — G56.00 CARPAL TUNNEL SYNDROME: ICD-10-CM

## 2022-05-20 DIAGNOSIS — G56.03 BILATERAL CARPAL TUNNEL SYNDROME: Primary | ICD-10-CM

## 2022-05-20 RX ORDER — TRAMADOL HYDROCHLORIDE 50 MG/1
50 TABLET ORAL EVERY 6 HOURS PRN
Qty: 10 TABLET | Refills: 0 | Status: SHIPPED | OUTPATIENT
Start: 2022-05-20 | End: 2022-07-26

## 2022-05-20 NOTE — H&P (VIEW-ONLY)
History of Present Illness:  Patient is a 71 y.o. female history of recent craniotomy here for bilateral carpal tunnel syndrome right greater than left.  Severe on the right side.  She is having constant numbness tingling in the right hand.  Little bit less on the left.  Primarily in the 1st 3 digits bilaterally.  She is doing better from a cranial standpoint.  Her surgery was in June and is feeling better now.  She is a caretaker couple times a week.  She is hesitant of surgery given that she had this recent large cranial event.  She has had no conservative management yet.     Review of patient's allergies indicates:  No Known Allergies          Past Medical History:   Diagnosis Date    Carpal tunnel syndrome, bilateral 2/3/2022    Headache      Hypertension      Movement disorder      Pulmonary embolism      Syncope and collapse      Weakness of right lower extremity 6/3/2021            Past Surgical History:   Procedure Laterality Date    CEREBRAL ANGIOGRAM N/A 6/6/2021     Procedure: ANGIOGRAM-CEREBRAL; bilateral MMA embolization;  Surgeon: Ramon Che MD;  Location: [facility] OR [address] [place] [address];  Service: Neurosurgery;  Laterality: N/A;    CRANIOTOMY FOR EVACUATION OF SUBDURAL HEMATOMA Bilateral 6/4/2021     Procedure: CRANIOTOMY, FOR SUBDURAL HEMATOMA EVACUATION;  Surgeon: Maribell Michaud MD;  Location: [facility] OR [address] [place] [address];  Service: Neurosurgery;  Laterality: Bilateral;            Family History   Problem Relation Age of Onset    Heart attacks under age 50 Mother 48    Depression Mother      Atrial fibrillation Father      Hypertension Father      Cancer Father      Heart disease Father      Deep vein thrombosis Sister           Unprovoked    Stomach cancer Maternal Grandmother      Aneurysm Neg Hx      Dementia Neg Hx      Diabetes Neg Hx      Fainting Neg Hx      Kidney disease Neg Hx      Liver disease Neg Hx      Migraines Neg Hx      Neuropathy Neg Hx      Parkinsonism Neg Hx       Seizures Neg Hx      Stroke Neg Hx      Tremor Neg Hx        Social History           Tobacco Use    Smoking status: Never Smoker    Smokeless tobacco: Never Used   Substance Use Topics    Alcohol use: Not Currently    Drug use: Not Currently         Review of Systems:  Patient denies constitutional symptoms, cardiac symptoms, respiratory symptoms, GI symptoms.  The remainder of the musculoskeletal ROS is included in the HPI.        OBJECTIVE:      Physical Exam:  Gen:  No acute distress  CV:  Peripherally well-perfused.  Pulses 2+ bilaterally.  Lungs:  Normal respiratory effort.  Abdomen:  Soft, non-tender, non-distended  Head/Neck:  Normocephalic.  Atraumatic. No TTP, AROM and PROM intact without pain  Neuro:  CN intact without deficit, SILT throughout B/L Upper & Lower Extremities     MSK:     Bilateral UE:  - skin intact, no visible deformities.  - AROM and PROM of the intact  - AIN/PIN/Radial/Median/Ulnar Nerves assessed in isolation without deficit  - SILT throughout  - Radial & Ulnar arteries palpated 2+  - Capillary Refill <3s  - tender palpation over carpal tunnel bilaterally.  Positive Tinel, positive Phalen bilaterally.     Diagnostic Results:  X-rays bilateral wrist were ordered and images reviewed by me.  These showed bilateral CMC arthritis severe on the left.     ASSESSMENT/PLAN:      A/P: Shirin Evans is a 71 y.o. bilateral carpal tunnel syndrome right greater than left.  Severe on the right.  She has bilateral CMC arthritis but these are not bothering her at this time.     Plan:     Injections at last appt helped her CTS for about 5 days. Symptoms have fully returned. She has exhausted conservative management for the past 4 months now. She would like to proceed with surgery at this time. Consented  in clinic. Plan for surgery 5/23/22.

## 2022-05-20 NOTE — PROGRESS NOTES
History of Present Illness:  Patient is a 71 y.o. female history of recent craniotomy here for bilateral carpal tunnel syndrome right greater than left.  Severe on the right side.  She is having constant numbness tingling in the right hand.  Little bit less on the left.  Primarily in the 1st 3 digits bilaterally.  She is doing better from a cranial standpoint.  Her surgery was in June and is feeling better now.  She is a caretaker couple times a week.  She is hesitant of surgery given that she had this recent large cranial event.  She has had no conservative management yet.     Review of patient's allergies indicates:  No Known Allergies          Past Medical History:   Diagnosis Date    Carpal tunnel syndrome, bilateral 2/3/2022    Headache      Hypertension      Movement disorder      Pulmonary embolism      Syncope and collapse      Weakness of right lower extremity 6/3/2021            Past Surgical History:   Procedure Laterality Date    CEREBRAL ANGIOGRAM N/A 6/6/2021     Procedure: ANGIOGRAM-CEREBRAL; bilateral MMA embolization;  Surgeon: Ramon Che MD;  Location: [facility] OR [address] [place] [address];  Service: Neurosurgery;  Laterality: N/A;    CRANIOTOMY FOR EVACUATION OF SUBDURAL HEMATOMA Bilateral 6/4/2021     Procedure: CRANIOTOMY, FOR SUBDURAL HEMATOMA EVACUATION;  Surgeon: Maribell Michaud MD;  Location: [facility] OR [address] [place] [address];  Service: Neurosurgery;  Laterality: Bilateral;            Family History   Problem Relation Age of Onset    Heart attacks under age 50 Mother 48    Depression Mother      Atrial fibrillation Father      Hypertension Father      Cancer Father      Heart disease Father      Deep vein thrombosis Sister           Unprovoked    Stomach cancer Maternal Grandmother      Aneurysm Neg Hx      Dementia Neg Hx      Diabetes Neg Hx      Fainting Neg Hx      Kidney disease Neg Hx      Liver disease Neg Hx      Migraines Neg Hx      Neuropathy Neg Hx      Parkinsonism Neg Hx       Seizures Neg Hx      Stroke Neg Hx      Tremor Neg Hx        Social History           Tobacco Use    Smoking status: Never Smoker    Smokeless tobacco: Never Used   Substance Use Topics    Alcohol use: Not Currently    Drug use: Not Currently         Review of Systems:  Patient denies constitutional symptoms, cardiac symptoms, respiratory symptoms, GI symptoms.  The remainder of the musculoskeletal ROS is included in the HPI.        OBJECTIVE:      Physical Exam:  Gen:  No acute distress  CV:  Peripherally well-perfused.  Pulses 2+ bilaterally.  Lungs:  Normal respiratory effort.  Abdomen:  Soft, non-tender, non-distended  Head/Neck:  Normocephalic.  Atraumatic. No TTP, AROM and PROM intact without pain  Neuro:  CN intact without deficit, SILT throughout B/L Upper & Lower Extremities     MSK:     Bilateral UE:  - skin intact, no visible deformities.  - AROM and PROM of the intact  - AIN/PIN/Radial/Median/Ulnar Nerves assessed in isolation without deficit  - SILT throughout  - Radial & Ulnar arteries palpated 2+  - Capillary Refill <3s  - tender palpation over carpal tunnel bilaterally.  Positive Tinel, positive Phalen bilaterally.     Diagnostic Results:  X-rays bilateral wrist were ordered and images reviewed by me.  These showed bilateral CMC arthritis severe on the left.     ASSESSMENT/PLAN:      A/P: Shirin Evans is a 71 y.o. bilateral carpal tunnel syndrome right greater than left.  Severe on the right.  She has bilateral CMC arthritis but these are not bothering her at this time.     Plan:     Injections at last appt helped her CTS for about 5 days. Symptoms have fully returned. She has exhausted conservative management for the past 4 months now. She would like to proceed with surgery at this time. Consented  in clinic. Plan for surgery 5/23/22.

## 2022-05-20 NOTE — TELEPHONE ENCOUNTER
PO 06/08/22 10:30    Spoke c pt. Informed pt of 5:15 a.m. arrival time for 05/23/22 surgery at the [facility] [place]. Reminded pt of NPO status & PO appt. Pt expressed understanding & was thankful.

## 2022-05-23 ENCOUNTER — ANESTHESIA (OUTPATIENT)
Dept: SURGERY | Facility: HOSPITAL | Age: 72
End: 2022-05-23
Payer: MEDICARE

## 2022-05-23 ENCOUNTER — HOSPITAL ENCOUNTER (OUTPATIENT)
Facility: HOSPITAL | Age: 72
Discharge: HOME | End: 2022-05-23
Attending: ORTHOPAEDIC SURGERY | Admitting: ORTHOPAEDIC SURGERY
Payer: MEDICARE

## 2022-05-23 VITALS
HEART RATE: 64 BPM | RESPIRATION RATE: 22 BRPM | HEIGHT: 63 IN | TEMPERATURE: 98 F | SYSTOLIC BLOOD PRESSURE: 100 MMHG | OXYGEN SATURATION: 98 % | DIASTOLIC BLOOD PRESSURE: 51 MMHG | WEIGHT: 155 LBS | BODY MASS INDEX: 27.46 KG/M2

## 2022-05-23 DIAGNOSIS — G56.01 CARPAL TUNNEL SYNDROME OF RIGHT WRIST: Primary | ICD-10-CM

## 2022-05-23 DIAGNOSIS — G56.03 BILATERAL CARPAL TUNNEL SYNDROME: ICD-10-CM

## 2022-05-23 DIAGNOSIS — G56.00 CARPAL TUNNEL SYNDROME: ICD-10-CM

## 2022-05-23 PROCEDURE — 25000003 PHARM REV CODE 250: Performed by: ORTHOPAEDIC SURGERY

## 2022-05-23 PROCEDURE — 94761 N-INVAS EAR/PLS OXIMETRY MLT: CPT

## 2022-05-23 PROCEDURE — D9220A PRA ANESTHESIA: ICD-10-PCS | Mod: CRNA,,, | Performed by: NURSE ANESTHETIST, CERTIFIED REGISTERED

## 2022-05-23 PROCEDURE — 37000008 HC ANESTHESIA 1ST 15 MINUTES: Performed by: ORTHOPAEDIC SURGERY

## 2022-05-23 PROCEDURE — 36000706: Performed by: ORTHOPAEDIC SURGERY

## 2022-05-23 PROCEDURE — 36000707: Performed by: ORTHOPAEDIC SURGERY

## 2022-05-23 PROCEDURE — D9220A PRA ANESTHESIA: Mod: ANES,,, | Performed by: ANESTHESIOLOGY

## 2022-05-23 PROCEDURE — 71000015 HC POSTOP RECOV 1ST HR: Performed by: ORTHOPAEDIC SURGERY

## 2022-05-23 PROCEDURE — 27201423 OPTIME MED/SURG SUP & DEVICES STERILE SUPPLY: Performed by: ORTHOPAEDIC SURGERY

## 2022-05-23 PROCEDURE — 25000003 PHARM REV CODE 250: Performed by: STUDENT IN AN ORGANIZED HEALTH CARE EDUCATION/TRAINING PROGRAM

## 2022-05-23 PROCEDURE — 37000009 HC ANESTHESIA EA ADD 15 MINS: Performed by: ORTHOPAEDIC SURGERY

## 2022-05-23 PROCEDURE — D9220A PRA ANESTHESIA: Mod: CRNA,,, | Performed by: NURSE ANESTHETIST, CERTIFIED REGISTERED

## 2022-05-23 PROCEDURE — 63600175 PHARM REV CODE 636 W HCPCS: Performed by: NURSE ANESTHETIST, CERTIFIED REGISTERED

## 2022-05-23 PROCEDURE — 63600175 PHARM REV CODE 636 W HCPCS: Performed by: STUDENT IN AN ORGANIZED HEALTH CARE EDUCATION/TRAINING PROGRAM

## 2022-05-23 PROCEDURE — 64721 CARPAL TUNNEL SURGERY: CPT | Mod: RT,,, | Performed by: ORTHOPAEDIC SURGERY

## 2022-05-23 PROCEDURE — 71000033 HC RECOVERY, INTIAL HOUR: Performed by: ORTHOPAEDIC SURGERY

## 2022-05-23 PROCEDURE — 99900035 HC TECH TIME PER 15 MIN (STAT)

## 2022-05-23 PROCEDURE — D9220A PRA ANESTHESIA: ICD-10-PCS | Mod: ANES,,, | Performed by: ANESTHESIOLOGY

## 2022-05-23 PROCEDURE — 25000003 PHARM REV CODE 250: Performed by: NURSE ANESTHETIST, CERTIFIED REGISTERED

## 2022-05-23 PROCEDURE — 64721 PR REVISE MEDIAN N/CARPAL TUNNEL SURG: ICD-10-PCS | Mod: RT,,, | Performed by: ORTHOPAEDIC SURGERY

## 2022-05-23 RX ORDER — HALOPERIDOL 5 MG/ML
0.5 INJECTION INTRAMUSCULAR EVERY 10 MIN PRN
Status: DISCONTINUED | OUTPATIENT
Start: 2022-05-23 | End: 2022-05-23 | Stop reason: HOSPADM

## 2022-05-23 RX ORDER — LIDOCAINE HYDROCHLORIDE 20 MG/ML
INJECTION INTRAVENOUS
Status: DISCONTINUED | OUTPATIENT
Start: 2022-05-23 | End: 2022-05-23

## 2022-05-23 RX ORDER — FENTANYL CITRATE 50 UG/ML
25 INJECTION, SOLUTION INTRAMUSCULAR; INTRAVENOUS EVERY 5 MIN PRN
Status: DISCONTINUED | OUTPATIENT
Start: 2022-05-23 | End: 2022-05-23 | Stop reason: HOSPADM

## 2022-05-23 RX ORDER — DEXAMETHASONE SODIUM PHOSPHATE 4 MG/ML
INJECTION, SOLUTION INTRA-ARTICULAR; INTRALESIONAL; INTRAMUSCULAR; INTRAVENOUS; SOFT TISSUE
Status: DISCONTINUED | OUTPATIENT
Start: 2022-05-23 | End: 2022-05-23

## 2022-05-23 RX ORDER — BUPIVACAINE HYDROCHLORIDE 2.5 MG/ML
INJECTION, SOLUTION EPIDURAL; INFILTRATION; INTRACAUDAL
Status: DISCONTINUED | OUTPATIENT
Start: 2022-05-23 | End: 2022-05-23 | Stop reason: HOSPADM

## 2022-05-23 RX ORDER — BACITRACIN ZINC 500 UNIT/G
OINTMENT (GRAM) TOPICAL
Status: DISCONTINUED | OUTPATIENT
Start: 2022-05-23 | End: 2022-05-23 | Stop reason: HOSPADM

## 2022-05-23 RX ORDER — SODIUM CHLORIDE 9 MG/ML
INJECTION, SOLUTION INTRAVENOUS CONTINUOUS
Status: DISCONTINUED | OUTPATIENT
Start: 2022-05-23 | End: 2022-05-23 | Stop reason: HOSPADM

## 2022-05-23 RX ORDER — PROPOFOL 10 MG/ML
VIAL (ML) INTRAVENOUS
Status: DISCONTINUED | OUTPATIENT
Start: 2022-05-23 | End: 2022-05-23

## 2022-05-23 RX ORDER — MIDAZOLAM HYDROCHLORIDE 1 MG/ML
INJECTION INTRAMUSCULAR; INTRAVENOUS
Status: DISCONTINUED | OUTPATIENT
Start: 2022-05-23 | End: 2022-05-23

## 2022-05-23 RX ORDER — CELECOXIB 200 MG/1
400 CAPSULE ORAL ONCE
Status: COMPLETED | OUTPATIENT
Start: 2022-05-23 | End: 2022-05-23

## 2022-05-23 RX ORDER — PROPOFOL 10 MG/ML
VIAL (ML) INTRAVENOUS CONTINUOUS PRN
Status: DISCONTINUED | OUTPATIENT
Start: 2022-05-23 | End: 2022-05-23

## 2022-05-23 RX ORDER — FAMOTIDINE 10 MG/ML
INJECTION INTRAVENOUS
Status: DISCONTINUED | OUTPATIENT
Start: 2022-05-23 | End: 2022-05-23

## 2022-05-23 RX ORDER — CEFAZOLIN SODIUM/WATER 2 G/20 ML
2 SYRINGE (ML) INTRAVENOUS
Status: COMPLETED | OUTPATIENT
Start: 2022-05-23 | End: 2022-05-23

## 2022-05-23 RX ORDER — ONDANSETRON 4 MG/1
4 TABLET, FILM COATED ORAL EVERY 8 HOURS PRN
Qty: 21 TABLET | Refills: 0 | Status: SHIPPED | OUTPATIENT
Start: 2022-05-23 | End: 2022-07-26

## 2022-05-23 RX ORDER — OXYCODONE HYDROCHLORIDE 5 MG/1
5 TABLET ORAL
Status: DISCONTINUED | OUTPATIENT
Start: 2022-05-23 | End: 2022-05-23 | Stop reason: HOSPADM

## 2022-05-23 RX ORDER — ACETAMINOPHEN 500 MG
1000 TABLET ORAL
Status: COMPLETED | OUTPATIENT
Start: 2022-05-23 | End: 2022-05-23

## 2022-05-23 RX ORDER — LIDOCAINE HYDROCHLORIDE 10 MG/ML
INJECTION, SOLUTION EPIDURAL; INFILTRATION; INTRACAUDAL; PERINEURAL
Status: DISCONTINUED | OUTPATIENT
Start: 2022-05-23 | End: 2022-05-23 | Stop reason: HOSPADM

## 2022-05-23 RX ORDER — SODIUM CHLORIDE 0.9 % (FLUSH) 0.9 %
3 SYRINGE (ML) INJECTION EVERY 6 HOURS
Status: DISCONTINUED | OUTPATIENT
Start: 2022-05-23 | End: 2022-05-23 | Stop reason: HOSPADM

## 2022-05-23 RX ADMIN — CELECOXIB 400 MG: 200 CAPSULE ORAL at 05:05

## 2022-05-23 RX ADMIN — PROPOFOL 50 MG: 10 INJECTION, EMULSION INTRAVENOUS at 07:05

## 2022-05-23 RX ADMIN — FAMOTIDINE 20 MG: 10 INJECTION, SOLUTION INTRAVENOUS at 06:05

## 2022-05-23 RX ADMIN — SODIUM CHLORIDE: 0.9 INJECTION, SOLUTION INTRAVENOUS at 05:05

## 2022-05-23 RX ADMIN — PROPOFOL 75 MCG/KG/MIN: 10 INJECTION, EMULSION INTRAVENOUS at 07:05

## 2022-05-23 RX ADMIN — LIDOCAINE HYDROCHLORIDE 50 MG: 20 INJECTION, SOLUTION INTRAVENOUS at 07:05

## 2022-05-23 RX ADMIN — DEXAMETHASONE SODIUM PHOSPHATE 4 MG: 4 INJECTION, SOLUTION INTRAMUSCULAR; INTRAVENOUS at 07:05

## 2022-05-23 RX ADMIN — ACETAMINOPHEN 1000 MG: 500 TABLET ORAL at 05:05

## 2022-05-23 RX ADMIN — MIDAZOLAM HYDROCHLORIDE 2 MG: 1 INJECTION, SOLUTION INTRAMUSCULAR; INTRAVENOUS at 06:05

## 2022-05-23 RX ADMIN — Medication 2 G: at 07:05

## 2022-05-23 NOTE — ANESTHESIA PREPROCEDURE EVALUATION
05/23/2022  Shirin Evans is a 71 y.o., female.    Active Ambulatory Problems     Diagnosis Date Noted    Mixed hyperlipidemia 10/20/2020    Essential hypertension 10/20/2020    BMI 26.0-26.9,adult 10/20/2020    History of pulmonary embolism 11/09/2020    Macrocytic anemia 11/09/2020    FRANCES (iron deficiency anemia) 06/02/2021    Subdural hematoma 06/03/2021    Brain compression 06/04/2021    Vitamin B12 deficiency 06/30/2021    Prediabetes 11/12/2021    Muscle weakness (generalized) 12/20/2021    Carpal tunnel syndrome, bilateral 02/03/2022     Resolved Ambulatory Problems     Diagnosis Date Noted    Deep vein thrombosis (DVT) of proximal lower extremity 10/20/2020    Weakness of right lower extremity 06/03/2021     Past Medical History:   Diagnosis Date    Headache     Hypertension     Movement disorder     Pulmonary embolism     Syncope and collapse          Pre-op Assessment    I have reviewed the Patient Summary Reports.     I have reviewed the Nursing Notes.       Review of Systems  Anesthesia Hx:  Denies Hx of Anesthetic complications    Social:  Non-Smoker    Cardiovascular:   Exercise tolerance: good Hypertension Denies CAD.     Denies Angina.  Denies ROCHE.    Pulmonary:   Denies COPD.  Denies Asthma.  Denies Shortness of breath.  Denies Sleep Apnea.    Renal/:   Denies Chronic Renal Disease.     Hepatic/GI:   Denies GERD. Denies Liver Disease.    Neurological:   CVA, no residual symptoms Headaches Denies Seizures.    Endocrine:   Denies Diabetes. Denies Hypothyroidism.        Physical Exam  General: Well nourished    Chest/Lungs:  Normal Respiratory Rate    Heart:  Rate: Normal        Anesthesia Plan  Type of Anesthesia, risks & benefits discussed:    Anesthesia Type: Gen Natural Airway  Intra-op Monitoring Plan: Standard ASA Monitors  Post Op Pain Control Plan: multimodal  analgesia and IV/PO Opioids PRN  Induction:  IV  Informed Consent: Informed consent signed with the Patient and all parties understand the risks and agree with anesthesia plan.  All questions answered.   ASA Score: 3  Anesthesia Plan Notes: The patient's PMH was reviewed and PE was performed  Plan for GA and natural airway. Will convert to secured airway if needed      Ready For Surgery From Anesthesia Perspective.     .

## 2022-05-23 NOTE — BRIEF OP NOTE
Brief Operative Note     SUMMARY     Surgery Date: 5/23/2022     Surgeon(s) and Role:     * Angelica Aguero MD - Primary    Assisting Surgeon: None    Pre-op Diagnosis:  Carpal tunnel syndrome of right wrist [G56.01]    Post-op Diagnosis:  Carpal tunnel syndrome of right wrist [G56.01]    Procedure(s) (LRB):  RELEASE, CARPAL TUNNEL,RIGHT (Right)    Anesthesia: Local MAC    Description of Procedure:   Right carpal tunnel release    Findings/Key Components:  Right carpal tunnel release    Estimated Blood Loss: Minimal         Specimens Removed:   Specimen (24h ago, onward)            None          Discharge Note      SUMMARY     Admit Date: 5/23/2022    Attending Physician: Angelica Aguero MD     Discharge Physician: Angelica Aguero MD    Discharge Date: 5/23/2022     Final Diagnosis: Carpal tunnel syndrome of right wrist [G56.01]    Hospital Course: Patient was admitted for an outpatient procedure and tolerated the procedure well with no complications.    Disposition: Home or Self Care    Follow Up/Patient Instructions:   Current Discharge Medication List      START taking these medications    Details   ondansetron (ZOFRAN) 4 MG tablet Take 1 tablet (4 mg total) by mouth every 8 (eight) hours as needed for Nausea.  Qty: 21 tablet, Refills: 0         CONTINUE these medications which have NOT CHANGED    Details   acetaminophen (TYLENOL) 325 MG tablet Take 2 tablets (650 mg total) by mouth every 4 (four) hours as needed.  Refills: 0      calcium carbonate (OS-SANDER) 500 mg calcium (1,250 mg) chewable tablet       ergocalciferol, vitamin D2, (VITAMIN D ORAL)       ferrous sulfate (FEOSOL) 325 mg (65 mg iron) Tab tablet Take 1 tablet (325 mg total) by mouth every other day.  Qty: 45 tablet, Refills: 3    Associated Diagnoses: Iron deficiency anemia, unspecified iron deficiency anemia type      gabapentin (NEURONTIN) 100 MG capsule Take 4 capsules (400 mg total) by mouth As instructed (head pain).  Qty: 120  capsule, Refills: 5    Comments: 300mg evening and 100mg morning      rosuvastatin (CRESTOR) 20 MG tablet TAKE 1 TABLET(20 MG) BY MOUTH DAILY  Qty: 90 tablet, Refills: 3    Comments: **Patient requests 90 days supply**      cyanocobalamin (VITAMIN B-12) 1000 MCG tablet Take 1 tablet (1,000 mcg total) by mouth once daily.  Qty: 30 tablet, Refills: 11    Associated Diagnoses: Vitamin B12 deficiency      traMADoL (ULTRAM) 50 mg tablet Take 1 tablet (50 mg total) by mouth every 6 (six) hours as needed for Pain.  Qty: 10 tablet, Refills: 0    Comments: Bedside delivery         STOP taking these medications       clopidogreL (PLAVIX) 75 mg tablet Comments:   Reason for Stopping:              Follow-up Information     [facility] Follow up in 2 week(s).    Specialty: Orthopedics  Why: For suture removal, For wound re-check  Contact information:  [address], [address]  [facility] [address] [phone]  Additional information:  [facility], 9th Floor   Please park in [place] [address] and use [address] elevators                     Discharge Procedure Orders (must include Diet, Follow-up, Activity)   Discharge Procedure Orders (must include Diet, Follow-up, Activity)   Lifting restrictions     Leave dressing on - Keep it clean, dry, and intact until clinic visit     Notify your health care provider if you experience any of the following:  severe uncontrolled pain     Notify your health care provider if you experience any of the following:  persistent nausea and vomiting or diarrhea     Notify your health care provider if you experience any of the following:  temperature >100.4     Activity as tolerated

## 2022-05-23 NOTE — PLAN OF CARE
VSS. Patient able to tolerate oral liquids. OR Patient denies c/o pain. Patient/family received home medication per bedside delivery. Dressing intact. No distress noted. Discharge instructions AND NIMBUS pump instructions reviewed with patient and family, verbalized understanding. IV discontinued with catheter tip intact. Patient states she would like to rest for 10 more minutes prior to discharge. Will continue to monitor.

## 2022-05-23 NOTE — ANESTHESIA POSTPROCEDURE EVALUATION
Anesthesia Post Evaluation    Patient: Shirin Evans    Procedure(s) Performed: Procedure(s) (LRB):  RELEASE, CARPAL TUNNEL,RIGHT (Right)    Final Anesthesia Type: general      Patient location during evaluation: PACU  Patient participation: Yes- Able to Participate  Level of consciousness: awake and alert  Post-procedure vital signs: reviewed and stable  Pain management: adequate  Airway patency: patent    PONV status at discharge: No PONV  Anesthetic complications: no      Cardiovascular status: blood pressure returned to baseline  Respiratory status: unassisted and spontaneous ventilation  Hydration status: euvolemic  Follow-up not needed.          Vitals Value Taken Time   /51 05/23/22 0815   Temp 36.7 °C (98 °F) 05/23/22 0815   Pulse 60 05/23/22 0816   Resp 43 05/23/22 0816   SpO2 99 % 05/23/22 0816   Vitals shown include unvalidated device data.      Event Time   Out of Recovery 08:20:00         Pain/Puja Score: Pain Rating Prior to Med Admin: 0 (5/23/2022  5:50 AM)  Puja Score: 10 (5/23/2022  7:47 AM)

## 2022-05-23 NOTE — TRANSFER OF CARE
"Anesthesia Transfer of Care Note    Patient: Shirin Evans    Procedure(s) Performed: Procedure(s) (LRB):  RELEASE, CARPAL TUNNEL,RIGHT (Right)    Patient location: PACU    Anesthesia Type: general    Transport from OR: Transported from OR on 100% O2 by closed face mask with adequate spontaneous ventilation    Post pain: adequate analgesia    Post assessment: no apparent anesthetic complications and tolerated procedure well    Post vital signs: stable    Level of consciousness: awake, alert and oriented    Nausea/Vomiting: no nausea/vomiting    Complications: none    Transfer of care protocol was followed      Last vitals:   Visit Vitals  /60 (BP Location: Left arm, Patient Position: Lying)   Pulse 71   Temp 36.7 °C (98 °F) (Oral)   Resp 18   Ht 5' 3" (1.6 m)   Wt 70.3 kg (155 lb)   LMP  (LMP Unknown)   SpO2 95%   Breastfeeding No   BMI 27.46 kg/m²     "

## 2022-06-06 ENCOUNTER — PATIENT MESSAGE (OUTPATIENT)
Dept: NEUROLOGY | Facility: CLINIC | Age: 72
End: 2022-06-06
Payer: MEDICARE

## 2022-06-06 ENCOUNTER — PATIENT MESSAGE (OUTPATIENT)
Dept: ORTHOPEDICS | Facility: CLINIC | Age: 72
End: 2022-06-06
Payer: MEDICARE

## 2022-06-06 ENCOUNTER — TELEPHONE (OUTPATIENT)
Dept: ORTHOPEDICS | Facility: CLINIC | Age: 72
End: 2022-06-06
Payer: MEDICARE

## 2022-06-13 ENCOUNTER — TELEPHONE (OUTPATIENT)
Dept: ORTHOPEDICS | Facility: CLINIC | Age: 72
End: 2022-06-13
Payer: MEDICARE

## 2022-06-14 ENCOUNTER — OFFICE VISIT (OUTPATIENT)
Dept: ORTHOPEDICS | Facility: CLINIC | Age: 72
End: 2022-06-14
Payer: MEDICARE

## 2022-06-14 VITALS — BODY MASS INDEX: 27.46 KG/M2 | HEIGHT: 63 IN | WEIGHT: 155 LBS

## 2022-06-14 DIAGNOSIS — Z98.890 POST-OPERATIVE STATE: Primary | ICD-10-CM

## 2022-06-14 PROCEDURE — 3008F PR BODY MASS INDEX (BMI) DOCUMENTED: ICD-10-PCS | Mod: CPTII,S$GLB,, | Performed by: PHYSICIAN ASSISTANT

## 2022-06-14 PROCEDURE — 1126F AMNT PAIN NOTED NONE PRSNT: CPT | Mod: CPTII,S$GLB,, | Performed by: PHYSICIAN ASSISTANT

## 2022-06-14 PROCEDURE — 3288F FALL RISK ASSESSMENT DOCD: CPT | Mod: CPTII,S$GLB,, | Performed by: PHYSICIAN ASSISTANT

## 2022-06-14 PROCEDURE — 3008F BODY MASS INDEX DOCD: CPT | Mod: CPTII,S$GLB,, | Performed by: PHYSICIAN ASSISTANT

## 2022-06-14 PROCEDURE — 99999 PR PBB SHADOW E&M-EST. PATIENT-LVL III: CPT | Mod: PBBFAC,,, | Performed by: PHYSICIAN ASSISTANT

## 2022-06-14 PROCEDURE — 1159F PR MEDICATION LIST DOCUMENTED IN MEDICAL RECORD: ICD-10-PCS | Mod: CPTII,S$GLB,, | Performed by: PHYSICIAN ASSISTANT

## 2022-06-14 PROCEDURE — 1101F PR PT FALLS ASSESS DOC 0-1 FALLS W/OUT INJ PAST YR: ICD-10-PCS | Mod: CPTII,S$GLB,, | Performed by: PHYSICIAN ASSISTANT

## 2022-06-14 PROCEDURE — 1159F MED LIST DOCD IN RCRD: CPT | Mod: CPTII,S$GLB,, | Performed by: PHYSICIAN ASSISTANT

## 2022-06-14 PROCEDURE — 1126F PR PAIN SEVERITY QUANTIFIED, NO PAIN PRESENT: ICD-10-PCS | Mod: CPTII,S$GLB,, | Performed by: PHYSICIAN ASSISTANT

## 2022-06-14 PROCEDURE — 99999 PR PBB SHADOW E&M-EST. PATIENT-LVL III: ICD-10-PCS | Mod: PBBFAC,,, | Performed by: PHYSICIAN ASSISTANT

## 2022-06-14 PROCEDURE — 1101F PT FALLS ASSESS-DOCD LE1/YR: CPT | Mod: CPTII,S$GLB,, | Performed by: PHYSICIAN ASSISTANT

## 2022-06-14 PROCEDURE — 99024 PR POST-OP FOLLOW-UP VISIT: ICD-10-PCS | Mod: S$GLB,,, | Performed by: PHYSICIAN ASSISTANT

## 2022-06-14 PROCEDURE — 3288F PR FALLS RISK ASSESSMENT DOCUMENTED: ICD-10-PCS | Mod: CPTII,S$GLB,, | Performed by: PHYSICIAN ASSISTANT

## 2022-06-14 PROCEDURE — 99024 POSTOP FOLLOW-UP VISIT: CPT | Mod: S$GLB,,, | Performed by: PHYSICIAN ASSISTANT

## 2022-06-14 NOTE — PROGRESS NOTES
"Ms. Evans is here today for a post-operative visit.  She is 22 days status post right carpal tunnel release by Dr. Aguero on 5/23/22. She reports that she is doing well. Symptoms are improved from prior to surgery. She does still have numbness and tingling not as prominent as pre op, prior to surgery had constant numbness. Pain is minimal. She denies fever, chills, and sweats since the time of the surgery.     Physical exam:    Vitals:    06/14/22 0947   Weight: 70.3 kg (154 lb 15.7 oz)   Height: 5' 3" (1.6 m)   PainSc: 0-No pain     Vital signs are stable, patient is afebrile.  Patient is well dressed and well groomed, no acute distress.  Alert and oriented to person, place, and time.  Post op dressing taken down.  Incision is clean, dry and intact.  There is no erythema or exudate.  There is no sign of any infection. She is NVI. Sutures removed without difficulty. Good ROM wrist, fingers.    Assessment: status post right carpal tunnel release by Dr. Aguero on 5/23/22    Plan:  Shirin was seen today for post-op evaluation.    Diagnoses and all orders for this visit:    Post-operative state      PO instruction reviewed and provided to patient  Brace prn activity  She denies left sided symptoms at this time she will monitor   RTC as needed       "

## 2022-07-25 ENCOUNTER — TELEPHONE (OUTPATIENT)
Dept: PRIMARY CARE CLINIC | Facility: CLINIC | Age: 72
End: 2022-07-25
Payer: MEDICARE

## 2022-07-25 NOTE — TELEPHONE ENCOUNTER
Spoke with pt, she needs meds refilled.   Explained I will get pt established with a PCP.    Dr. Siegel is scheduled for 1530 on 7/26/2022. Notified pt, verbalized understanding.

## 2022-07-26 ENCOUNTER — OFFICE VISIT (OUTPATIENT)
Dept: INTERNAL MEDICINE | Facility: CLINIC | Age: 72
End: 2022-07-26
Payer: MEDICARE

## 2022-07-26 VITALS
RESPIRATION RATE: 14 BRPM | HEART RATE: 66 BPM | BODY MASS INDEX: 28.52 KG/M2 | HEIGHT: 63 IN | OXYGEN SATURATION: 93 % | WEIGHT: 160.94 LBS | SYSTOLIC BLOOD PRESSURE: 124 MMHG | DIASTOLIC BLOOD PRESSURE: 80 MMHG

## 2022-07-26 DIAGNOSIS — E53.8 VITAMIN B12 DEFICIENCY: ICD-10-CM

## 2022-07-26 DIAGNOSIS — R73.9 HYPERGLYCEMIA, UNSPECIFIED: ICD-10-CM

## 2022-07-26 DIAGNOSIS — G89.29 CHRONIC NONINTRACTABLE HEADACHE, UNSPECIFIED HEADACHE TYPE: ICD-10-CM

## 2022-07-26 DIAGNOSIS — D50.9 IRON DEFICIENCY ANEMIA, UNSPECIFIED IRON DEFICIENCY ANEMIA TYPE: ICD-10-CM

## 2022-07-26 DIAGNOSIS — E55.9 VITAMIN D DEFICIENCY: ICD-10-CM

## 2022-07-26 DIAGNOSIS — Z12.31 BREAST CANCER SCREENING BY MAMMOGRAM: ICD-10-CM

## 2022-07-26 DIAGNOSIS — Z98.890 HISTORY OF CRANIOTOMY: ICD-10-CM

## 2022-07-26 DIAGNOSIS — Z86.79 HISTORY OF SUBDURAL HEMATOMA: ICD-10-CM

## 2022-07-26 DIAGNOSIS — I10 ESSENTIAL HYPERTENSION: ICD-10-CM

## 2022-07-26 DIAGNOSIS — Z86.711 HISTORY OF PULMONARY EMBOLISM: ICD-10-CM

## 2022-07-26 DIAGNOSIS — Z86.16 HISTORY OF COVID-19: ICD-10-CM

## 2022-07-26 DIAGNOSIS — R51.9 CHRONIC NONINTRACTABLE HEADACHE, UNSPECIFIED HEADACHE TYPE: ICD-10-CM

## 2022-07-26 DIAGNOSIS — Z00.00 ENCOUNTER FOR ANNUAL PHYSICAL EXAM: Primary | ICD-10-CM

## 2022-07-26 DIAGNOSIS — E78.2 MIXED HYPERLIPIDEMIA: ICD-10-CM

## 2022-07-26 PROCEDURE — 99397 PER PM REEVAL EST PAT 65+ YR: CPT | Mod: S$GLB,,, | Performed by: INTERNAL MEDICINE

## 2022-07-26 PROCEDURE — 3079F PR MOST RECENT DIASTOLIC BLOOD PRESSURE 80-89 MM HG: ICD-10-PCS | Mod: CPTII,S$GLB,, | Performed by: INTERNAL MEDICINE

## 2022-07-26 PROCEDURE — 1101F PR PT FALLS ASSESS DOC 0-1 FALLS W/OUT INJ PAST YR: ICD-10-PCS | Mod: CPTII,S$GLB,, | Performed by: INTERNAL MEDICINE

## 2022-07-26 PROCEDURE — 3074F SYST BP LT 130 MM HG: CPT | Mod: CPTII,S$GLB,, | Performed by: INTERNAL MEDICINE

## 2022-07-26 PROCEDURE — 3074F PR MOST RECENT SYSTOLIC BLOOD PRESSURE < 130 MM HG: ICD-10-PCS | Mod: CPTII,S$GLB,, | Performed by: INTERNAL MEDICINE

## 2022-07-26 PROCEDURE — 3288F PR FALLS RISK ASSESSMENT DOCUMENTED: ICD-10-PCS | Mod: CPTII,S$GLB,, | Performed by: INTERNAL MEDICINE

## 2022-07-26 PROCEDURE — 99999 PR PBB SHADOW E&M-EST. PATIENT-LVL III: ICD-10-PCS | Mod: PBBFAC,,, | Performed by: INTERNAL MEDICINE

## 2022-07-26 PROCEDURE — 1101F PT FALLS ASSESS-DOCD LE1/YR: CPT | Mod: CPTII,S$GLB,, | Performed by: INTERNAL MEDICINE

## 2022-07-26 PROCEDURE — 1159F PR MEDICATION LIST DOCUMENTED IN MEDICAL RECORD: ICD-10-PCS | Mod: CPTII,S$GLB,, | Performed by: INTERNAL MEDICINE

## 2022-07-26 PROCEDURE — 1159F MED LIST DOCD IN RCRD: CPT | Mod: CPTII,S$GLB,, | Performed by: INTERNAL MEDICINE

## 2022-07-26 PROCEDURE — 1125F AMNT PAIN NOTED PAIN PRSNT: CPT | Mod: CPTII,S$GLB,, | Performed by: INTERNAL MEDICINE

## 2022-07-26 PROCEDURE — 3008F BODY MASS INDEX DOCD: CPT | Mod: CPTII,S$GLB,, | Performed by: INTERNAL MEDICINE

## 2022-07-26 PROCEDURE — 3008F PR BODY MASS INDEX (BMI) DOCUMENTED: ICD-10-PCS | Mod: CPTII,S$GLB,, | Performed by: INTERNAL MEDICINE

## 2022-07-26 PROCEDURE — 99397 PR PREVENTIVE VISIT,EST,65 & OVER: ICD-10-PCS | Mod: S$GLB,,, | Performed by: INTERNAL MEDICINE

## 2022-07-26 PROCEDURE — 99999 PR PBB SHADOW E&M-EST. PATIENT-LVL III: CPT | Mod: PBBFAC,,, | Performed by: INTERNAL MEDICINE

## 2022-07-26 PROCEDURE — 3288F FALL RISK ASSESSMENT DOCD: CPT | Mod: CPTII,S$GLB,, | Performed by: INTERNAL MEDICINE

## 2022-07-26 PROCEDURE — 1125F PR PAIN SEVERITY QUANTIFIED, PAIN PRESENT: ICD-10-PCS | Mod: CPTII,S$GLB,, | Performed by: INTERNAL MEDICINE

## 2022-07-26 PROCEDURE — 3079F DIAST BP 80-89 MM HG: CPT | Mod: CPTII,S$GLB,, | Performed by: INTERNAL MEDICINE

## 2022-07-26 RX ORDER — FERROUS SULFATE 325(65) MG
325 TABLET ORAL EVERY OTHER DAY
Qty: 45 TABLET | Refills: 3 | Status: SHIPPED | OUTPATIENT
Start: 2022-07-26 | End: 2023-01-26 | Stop reason: SDUPTHER

## 2022-07-26 RX ORDER — ROSUVASTATIN CALCIUM 20 MG/1
20 TABLET, COATED ORAL NIGHTLY
Qty: 90 TABLET | Refills: 3 | Status: SHIPPED | OUTPATIENT
Start: 2022-07-26 | End: 2023-01-26 | Stop reason: SDUPTHER

## 2022-07-26 RX ORDER — DOXYCYCLINE 40 MG/1
40 CAPSULE ORAL DAILY PRN
COMMUNITY

## 2022-07-26 NOTE — PROGRESS NOTES
Subjective:       Patient ID: Shirin Evans is a 71 y.o. female.    Chief Complaint: Establish Care and Annual Exam      HPI  Shirin Evans is a 71 y.o. year old female with history of subdural hematoma s/p craniotomy, residual chronic headaches intermittent nonintractable, CTS s/p release of R hand, HTN (lifestyle controlled), HLD presents for establishment of care. Previous pt of Dr. Villanueva. Today patient is at baseline health, requesting refills of her medications.     Review of Systems   Constitutional: Negative for activity change, appetite change, fatigue, fever and unexpected weight change.   HENT: Negative for congestion, hearing loss, postnasal drip, sneezing, sore throat, trouble swallowing and voice change.    Eyes: Negative for pain and discharge.   Respiratory: Negative for cough, choking, chest tightness, shortness of breath and wheezing.    Cardiovascular: Negative for chest pain, palpitations and leg swelling.   Gastrointestinal: Negative for abdominal distention, abdominal pain, blood in stool, constipation, diarrhea, nausea and vomiting.   Endocrine: Negative for polydipsia and polyuria.   Genitourinary: Negative for difficulty urinating and flank pain.   Musculoskeletal: Negative for arthralgias, back pain, joint swelling, myalgias and neck pain.   Skin: Negative for rash.   Neurological: Positive for headaches. Negative for dizziness, tremors, seizures, weakness, light-headedness and numbness.   Psychiatric/Behavioral: Negative for agitation. The patient is not nervous/anxious.          Past Medical History:   Diagnosis Date    Carpal tunnel syndrome, bilateral 2/3/2022    Headache     Hypertension     Movement disorder     Pulmonary embolism     Syncope and collapse     Weakness of right lower extremity 6/3/2021        Prior to Admission medications    Medication Sig Start Date End Date Taking? Authorizing Provider   acetaminophen (TYLENOL) 325 MG tablet Take 2 tablets  (650 mg total) by mouth every 4 (four) hours as needed. 6/11/21  Yes Jojo Sharma PA-C   calcium carbonate (OS-SANDER) 500 mg calcium (1,250 mg) chewable tablet  9/19/21  Yes Historical Provider   cyanocobalamin (VITAMIN B-12) 1000 MCG tablet Take 1 tablet (1,000 mcg total) by mouth once daily. 6/30/21  Yes Chris Villanueva MD   doxycycline (ORACEA) 40 mg capsule Take 40 mg by mouth daily as needed.   Yes Historical Provider   ergocalciferol, vitamin D2, (VITAMIN D ORAL)  9/19/21  Yes Historical Provider   gabapentin (NEURONTIN) 100 MG capsule TAKE 1 CAPSULE BY MOUTH EVERY MORNING AND 3 CAPSULES EVERY EVENING AS DIRECTED 7/15/22  Yes Drake Pride MD   ondansetron (ZOFRAN) 4 MG tablet Take 1 tablet (4 mg total) by mouth every 8 (eight) hours as needed for Nausea. 5/23/22 7/26/22 Yes MELI Estevez   rosuvastatin (CRESTOR) 20 MG tablet TAKE 1 TABLET(20 MG) BY MOUTH DAILY 11/1/21 7/26/22 Yes Chris Villanueva MD   ferrous sulfate (FEOSOL) 325 mg (65 mg iron) Tab tablet Take 1 tablet (325 mg total) by mouth every other day. 7/26/22   David Siegel MD   rosuvastatin (CRESTOR) 20 MG tablet Take 1 tablet (20 mg total) by mouth every evening. 7/26/22   David Siegel MD   clopidogreL (PLAVIX) 75 mg tablet Take 1 tablet (75 mg total) by mouth once daily. for 21 days 6/3/21 6/3/21  Savanah Casarez MD   ferrous sulfate (FEOSOL) 325 mg (65 mg iron) Tab tablet Take 1 tablet (325 mg total) by mouth every other day.  Patient not taking: Reported on 7/26/2022 6/2/21 7/26/22  Chris Villanueva MD   traMADoL (ULTRAM) 50 mg tablet Take 1 tablet (50 mg total) by mouth every 6 (six) hours as needed for Pain.  Patient not taking: Reported on 7/26/2022 5/20/22 7/26/22  MELI Estevez        Past medical history, surgical history, and family medical history reviewed and updated as appropriate.    Medications and allergies reviewed.     Objective:          Vitals:    07/26/22 1519   BP: 124/80   BP Location: Right arm  "  Patient Position: Sitting   BP Method: Small (Manual)   Pulse: 66   Resp: 14   SpO2: (!) 93%   Weight: 73 kg (160 lb 15 oz)   Height: 5' 3" (1.6 m)     Body mass index is 28.51 kg/m².  Physical Exam  Constitutional:       Appearance: Normal appearance. She is well-developed.   HENT:      Head: Atraumatic.      Comments: S/p craniotomy x 2, well healed  Eyes:      Extraocular Movements: Extraocular movements intact.      Pupils: Pupils are equal, round, and reactive to light.   Cardiovascular:      Rate and Rhythm: Normal rate and regular rhythm.      Heart sounds: Normal heart sounds.   Pulmonary:      Effort: Pulmonary effort is normal. No respiratory distress.      Breath sounds: Normal breath sounds. No wheezing.   Abdominal:      General: There is no distension.      Palpations: Abdomen is soft.      Tenderness: There is no abdominal tenderness.   Musculoskeletal:         General: No tenderness. Normal range of motion.      Cervical back: Normal range of motion and neck supple. No tenderness.   Lymphadenopathy:      Cervical: No cervical adenopathy.   Skin:     General: Skin is warm and dry.   Neurological:      Mental Status: She is alert and oriented to person, place, and time.      Cranial Nerves: No cranial nerve deficit.      Deep Tendon Reflexes: Reflexes are normal and symmetric.         Lab Results   Component Value Date    WBC 4.26 06/12/2021    HGB 9.5 (L) 06/12/2021    HCT 28.3 (L) 06/12/2021     06/12/2021    CHOL 126 06/03/2021    TRIG 66 06/03/2021    HDL 56 06/03/2021    ALT 13 06/12/2021    AST 21 06/12/2021     06/12/2021    K 3.9 06/12/2021     06/12/2021    CREATININE 0.6 06/12/2021    BUN 9 06/12/2021    CO2 25 06/12/2021    TSH 1.516 11/11/2021    INR 1.0 06/03/2021    HGBA1C 5.8 (H) 11/11/2021       Assessment:       1. Encounter for annual physical exam    2. Essential hypertension    3. Mixed hyperlipidemia    4. Hyperglycemia, unspecified    5. History of craniotomy " for evacuation of subdural hematoma    6. History of subdural hematoma    7. Chronic nonintractable headache, unspecified headache type    8. History of COVID-19    9. History of pulmonary embolism    10. Iron deficiency anemia, unspecified iron deficiency anemia type    11. Vitamin B12 deficiency    12. Vitamin D deficiency    13. Breast cancer screening by mammogram    14. BMI 28.0-28.9,adult          Plan:     Shirin was seen today for establish care and annual exam.    Diagnoses and all orders for this visit:    Encounter for annual physical exam    Essential hypertension  Comments:  diet and lifestyle controlled, was on medicaitons previously, however contributted to dizziness.   Orders:  -     CBC Auto Differential; Future  -     Comprehensive Metabolic Panel; Future  -     TSH; Future  -     Hemoglobin A1C; Future    Mixed hyperlipidemia  Comments:  on crestor 20, no change to management, recheck lipid panel  Orders:  -     Hemoglobin A1C; Future  -     Lipid Panel; Future  -     rosuvastatin (CRESTOR) 20 MG tablet; Take 1 tablet (20 mg total) by mouth every evening.    Hyperglycemia, unspecified  Comments:  check a1c, BMI 28.5  Orders:  -     Hemoglobin A1C; Future    History of craniotomy for evacuation of subdural hematoma  Comments:  6/2021 - Dr. Michaud, subdural hematoma evacuation    History of subdural hematoma    Chronic nonintractable headache, unspecified headache type  Comments:  improves with time, intermittent, improves with tylenol; associated with hx of subdural hematoma / craniotomy    History of COVID-19  Comments:  diagnosed 6/2022; resolution of symptoms, no persistent symptoms     History of pulmonary embolism  Comments:  diagnosed as provoked, was on eliquis >6 months, cleared by heme/onc    Iron deficiency anemia, unspecified iron deficiency anemia type  Comments:  on ferrous sulfate 325 every other day, recheck iron panel  Orders:  -     CBC Auto Differential; Future  -     Vitamin B12;  Future  -     Iron and TIBC; Future  -     Ferritin; Future  -     ferrous sulfate (FEOSOL) 325 mg (65 mg iron) Tab tablet; Take 1 tablet (325 mg total) by mouth every other day.    Vitamin B12 deficiency  Comments:  on b12 1000 mcg daily, recheck b12 levels; pt to restart OTC  Orders:  -     Vitamin B12; Future    Vitamin D deficiency  Comments:  pt on calcium / d3 gummies, will recheck vit D level  Orders:  -     Vitamin D; Future    Breast cancer screening by mammogram  -     Mammo Digital Screening Bilat; Future    BMI 28.0-28.9,adult  -     Hemoglobin A1C; Future    Benign physical examination, no issues identified. Will obtain routine labwork and age appropriate health screenings.     Health maintenance reviewed with patient. Patient is due for covid-19 second booster (pfizer), shingles series, pneumonia vaccination    Follow up in about 6 months (around 1/26/2023).    David Siegel MD  Internal Medicine / Primary Care  [facility]  7/26/2022

## 2022-07-26 NOTE — PATIENT INSTRUCTIONS
Covid-19 second booster today - Pfizer  Schedule fasting labwork  Schedule mammogram    Immunizations due:  Shingles vaccination (Shingrix, two shot series, 2-6 months apart)  Pneumonia     Can get these done at any local pharmacy or return to [facility].    Return to clinic in 6 months for annual exam

## 2022-08-09 ENCOUNTER — LAB VISIT (OUTPATIENT)
Dept: LAB | Facility: HOSPITAL | Age: 72
End: 2022-08-09
Attending: INTERNAL MEDICINE
Payer: MEDICARE

## 2022-08-09 ENCOUNTER — IMMUNIZATION (OUTPATIENT)
Dept: INTERNAL MEDICINE | Facility: CLINIC | Age: 72
End: 2022-08-09
Payer: MEDICARE

## 2022-08-09 DIAGNOSIS — Z23 NEED FOR VACCINATION: Primary | ICD-10-CM

## 2022-08-09 DIAGNOSIS — I10 ESSENTIAL HYPERTENSION: ICD-10-CM

## 2022-08-09 DIAGNOSIS — D50.9 IRON DEFICIENCY ANEMIA, UNSPECIFIED IRON DEFICIENCY ANEMIA TYPE: ICD-10-CM

## 2022-08-09 DIAGNOSIS — E78.2 MIXED HYPERLIPIDEMIA: ICD-10-CM

## 2022-08-09 DIAGNOSIS — E53.8 VITAMIN B12 DEFICIENCY: ICD-10-CM

## 2022-08-09 DIAGNOSIS — R73.9 HYPERGLYCEMIA, UNSPECIFIED: ICD-10-CM

## 2022-08-09 DIAGNOSIS — E55.9 VITAMIN D DEFICIENCY: ICD-10-CM

## 2022-08-09 LAB
25(OH)D3+25(OH)D2 SERPL-MCNC: 58 NG/ML (ref 30–96)
ALBUMIN SERPL BCP-MCNC: 4.1 G/DL (ref 3.5–5.2)
ALP SERPL-CCNC: 64 U/L (ref 55–135)
ALT SERPL W/O P-5'-P-CCNC: 11 U/L (ref 10–44)
ANION GAP SERPL CALC-SCNC: 9 MMOL/L (ref 8–16)
AST SERPL-CCNC: 16 U/L (ref 10–40)
BASOPHILS # BLD AUTO: 0.03 K/UL (ref 0–0.2)
BASOPHILS NFR BLD: 0.7 % (ref 0–1.9)
BILIRUB SERPL-MCNC: 0.6 MG/DL (ref 0.1–1)
BUN SERPL-MCNC: 12 MG/DL (ref 8–23)
CALCIUM SERPL-MCNC: 10.4 MG/DL (ref 8.7–10.5)
CHLORIDE SERPL-SCNC: 106 MMOL/L (ref 95–110)
CHOLEST SERPL-MCNC: 113 MG/DL (ref 120–199)
CHOLEST/HDLC SERPL: 2.1 {RATIO} (ref 2–5)
CO2 SERPL-SCNC: 27 MMOL/L (ref 23–29)
CREAT SERPL-MCNC: 0.7 MG/DL (ref 0.5–1.4)
DIFFERENTIAL METHOD: NORMAL
EOSINOPHIL # BLD AUTO: 0.1 K/UL (ref 0–0.5)
EOSINOPHIL NFR BLD: 1.4 % (ref 0–8)
ERYTHROCYTE [DISTWIDTH] IN BLOOD BY AUTOMATED COUNT: 13.8 % (ref 11.5–14.5)
EST. GFR  (NO RACE VARIABLE): >60 ML/MIN/1.73 M^2
ESTIMATED AVG GLUCOSE: 117 MG/DL (ref 68–131)
FERRITIN SERPL-MCNC: 111 NG/ML (ref 20–300)
GLUCOSE SERPL-MCNC: 95 MG/DL (ref 70–110)
HBA1C MFR BLD: 5.7 % (ref 4–5.6)
HCT VFR BLD AUTO: 37.7 % (ref 37–48.5)
HDLC SERPL-MCNC: 55 MG/DL (ref 40–75)
HDLC SERPL: 48.7 % (ref 20–50)
HGB BLD-MCNC: 12.4 G/DL (ref 12–16)
IMM GRANULOCYTES # BLD AUTO: 0.02 K/UL (ref 0–0.04)
IMM GRANULOCYTES NFR BLD AUTO: 0.5 % (ref 0–0.5)
IRON SERPL-MCNC: 103 UG/DL (ref 30–160)
LDLC SERPL CALC-MCNC: 48.6 MG/DL (ref 63–159)
LYMPHOCYTES # BLD AUTO: 1.3 K/UL (ref 1–4.8)
LYMPHOCYTES NFR BLD: 28.8 % (ref 18–48)
MCH RBC QN AUTO: 29.7 PG (ref 27–31)
MCHC RBC AUTO-ENTMCNC: 32.9 G/DL (ref 32–36)
MCV RBC AUTO: 90 FL (ref 82–98)
MONOCYTES # BLD AUTO: 0.3 K/UL (ref 0.3–1)
MONOCYTES NFR BLD: 7.1 % (ref 4–15)
NEUTROPHILS # BLD AUTO: 2.7 K/UL (ref 1.8–7.7)
NEUTROPHILS NFR BLD: 61.5 % (ref 38–73)
NONHDLC SERPL-MCNC: 58 MG/DL
NRBC BLD-RTO: 0 /100 WBC
PLATELET # BLD AUTO: 199 K/UL (ref 150–450)
PMV BLD AUTO: 9.9 FL (ref 9.2–12.9)
POTASSIUM SERPL-SCNC: 4.1 MMOL/L (ref 3.5–5.1)
PROT SERPL-MCNC: 7 G/DL (ref 6–8.4)
RBC # BLD AUTO: 4.17 M/UL (ref 4–5.4)
SATURATED IRON: 26 % (ref 20–50)
SODIUM SERPL-SCNC: 142 MMOL/L (ref 136–145)
TOTAL IRON BINDING CAPACITY: 394 UG/DL (ref 250–450)
TRANSFERRIN SERPL-MCNC: 266 MG/DL (ref 200–375)
TRIGL SERPL-MCNC: 47 MG/DL (ref 30–150)
TSH SERPL DL<=0.005 MIU/L-ACNC: 1.83 UIU/ML (ref 0.4–4)
VIT B12 SERPL-MCNC: 554 PG/ML (ref 210–950)
WBC # BLD AUTO: 4.38 K/UL (ref 3.9–12.7)

## 2022-08-09 PROCEDURE — 83036 HEMOGLOBIN GLYCOSYLATED A1C: CPT | Performed by: INTERNAL MEDICINE

## 2022-08-09 PROCEDURE — 91305 COVID-19, MRNA, LNP-S, PF, 30 MCG/0.3 ML DOSE VACCINE (PFIZER): CPT | Mod: PBBFAC | Performed by: INTERNAL MEDICINE

## 2022-08-09 PROCEDURE — 80061 LIPID PANEL: CPT | Performed by: INTERNAL MEDICINE

## 2022-08-09 PROCEDURE — 80053 COMPREHEN METABOLIC PANEL: CPT | Performed by: INTERNAL MEDICINE

## 2022-08-09 PROCEDURE — 84443 ASSAY THYROID STIM HORMONE: CPT | Performed by: INTERNAL MEDICINE

## 2022-08-09 PROCEDURE — 82306 VITAMIN D 25 HYDROXY: CPT | Performed by: INTERNAL MEDICINE

## 2022-08-09 PROCEDURE — 84466 ASSAY OF TRANSFERRIN: CPT | Performed by: INTERNAL MEDICINE

## 2022-08-09 PROCEDURE — 82728 ASSAY OF FERRITIN: CPT | Performed by: INTERNAL MEDICINE

## 2022-08-09 PROCEDURE — 36415 COLL VENOUS BLD VENIPUNCTURE: CPT | Performed by: INTERNAL MEDICINE

## 2022-08-09 PROCEDURE — 85025 COMPLETE CBC W/AUTO DIFF WBC: CPT | Performed by: INTERNAL MEDICINE

## 2022-08-09 PROCEDURE — 82607 VITAMIN B-12: CPT | Performed by: INTERNAL MEDICINE

## 2022-09-06 ENCOUNTER — TELEPHONE (OUTPATIENT)
Dept: NEUROLOGY | Facility: CLINIC | Age: 72
End: 2022-09-06
Payer: MEDICARE

## 2022-09-06 NOTE — TELEPHONE ENCOUNTER
----- Message from Luis Alberto Mcclelland sent at 2022  4:50 PM CDT -----  Regardin mo f/u due in Nov      The Pt states that she would like a call back to sched for a Tues or a Thurs when she has a ride.     # [phone]

## 2022-09-09 NOTE — TELEPHONE ENCOUNTER
Spoke with pt about scheduling her 6 month follow up with Dr. Pride. The 6 month juan francisco would be in November and I informed the pt the schedules have not been released yet. Pt states she will call back later in October. I also set a reminder to call the pt to schedule the follow up the last week of October.   Pt verbalized understanding.

## 2022-09-22 ENCOUNTER — HOSPITAL ENCOUNTER (OUTPATIENT)
Dept: RADIOLOGY | Facility: HOSPITAL | Age: 72
Discharge: HOME | End: 2022-09-22
Attending: INTERNAL MEDICINE
Payer: MEDICARE

## 2022-09-22 DIAGNOSIS — Z12.31 BREAST CANCER SCREENING BY MAMMOGRAM: ICD-10-CM

## 2022-09-22 PROCEDURE — 77067 MAMMO DIGITAL SCREENING BILAT WITH TOMO: ICD-10-PCS | Mod: 26,,, | Performed by: RADIOLOGY

## 2022-09-22 PROCEDURE — 77063 MAMMO DIGITAL SCREENING BILAT WITH TOMO: ICD-10-PCS | Mod: 26,,, | Performed by: RADIOLOGY

## 2022-09-22 PROCEDURE — 77067 SCR MAMMO BI INCL CAD: CPT | Mod: 26,,, | Performed by: RADIOLOGY

## 2022-09-22 PROCEDURE — 77063 BREAST TOMOSYNTHESIS BI: CPT | Mod: 26,,, | Performed by: RADIOLOGY

## 2022-09-22 PROCEDURE — 77067 SCR MAMMO BI INCL CAD: CPT | Mod: TC

## 2022-10-12 ENCOUNTER — TELEPHONE (OUTPATIENT)
Dept: NEUROLOGY | Facility: CLINIC | Age: 72
End: 2022-10-12
Payer: MEDICARE

## 2022-10-12 NOTE — TELEPHONE ENCOUNTER
----- Message from Carolyn Francis sent at 10/12/2022  3:19 PM CDT -----  Regardin month F/u  Contact: [phone]  Pt is needing an appointment for her 6 month f/u. Please call to discuss further @ [phone]

## 2022-10-13 ENCOUNTER — TELEPHONE (OUTPATIENT)
Dept: NEUROLOGY | Facility: CLINIC | Age: 72
End: 2022-10-13
Payer: MEDICARE

## 2022-10-13 NOTE — TELEPHONE ENCOUNTER
Called pt to schedule 6 mo f/u appt with Dr. Prater. Appt was scheduled for 11/17 at 10am. Appt was originally scheduled for commercial insurance but I had Jose override this to schedule her on this date since she is medicare and cannot come in at any other time.

## 2022-11-01 ENCOUNTER — PATIENT MESSAGE (OUTPATIENT)
Dept: ADMINISTRATIVE | Facility: HOSPITAL | Age: 72
End: 2022-11-01
Payer: MEDICARE

## 2022-11-17 ENCOUNTER — OFFICE VISIT (OUTPATIENT)
Dept: NEUROLOGY | Facility: CLINIC | Age: 72
End: 2022-11-17
Payer: MEDICARE

## 2022-11-17 VITALS
WEIGHT: 158.94 LBS | HEART RATE: 62 BPM | DIASTOLIC BLOOD PRESSURE: 82 MMHG | SYSTOLIC BLOOD PRESSURE: 115 MMHG | HEIGHT: 63 IN | BODY MASS INDEX: 28.16 KG/M2

## 2022-11-17 DIAGNOSIS — R51.9 NONINTRACTABLE EPISODIC HEADACHE, UNSPECIFIED HEADACHE TYPE: Primary | ICD-10-CM

## 2022-11-17 PROCEDURE — 1101F PT FALLS ASSESS-DOCD LE1/YR: CPT | Mod: CPTII,S$GLB,, | Performed by: PSYCHIATRY & NEUROLOGY

## 2022-11-17 PROCEDURE — 1101F PR PT FALLS ASSESS DOC 0-1 FALLS W/OUT INJ PAST YR: ICD-10-PCS | Mod: CPTII,S$GLB,, | Performed by: PSYCHIATRY & NEUROLOGY

## 2022-11-17 PROCEDURE — 3008F PR BODY MASS INDEX (BMI) DOCUMENTED: ICD-10-PCS | Mod: CPTII,S$GLB,, | Performed by: PSYCHIATRY & NEUROLOGY

## 2022-11-17 PROCEDURE — 3288F PR FALLS RISK ASSESSMENT DOCUMENTED: ICD-10-PCS | Mod: CPTII,S$GLB,, | Performed by: PSYCHIATRY & NEUROLOGY

## 2022-11-17 PROCEDURE — 99214 OFFICE O/P EST MOD 30 MIN: CPT | Mod: S$GLB,,, | Performed by: PSYCHIATRY & NEUROLOGY

## 2022-11-17 PROCEDURE — 3074F PR MOST RECENT SYSTOLIC BLOOD PRESSURE < 130 MM HG: ICD-10-PCS | Mod: CPTII,S$GLB,, | Performed by: PSYCHIATRY & NEUROLOGY

## 2022-11-17 PROCEDURE — 3079F PR MOST RECENT DIASTOLIC BLOOD PRESSURE 80-89 MM HG: ICD-10-PCS | Mod: CPTII,S$GLB,, | Performed by: PSYCHIATRY & NEUROLOGY

## 2022-11-17 PROCEDURE — 99214 PR OFFICE/OUTPT VISIT, EST, LEVL IV, 30-39 MIN: ICD-10-PCS | Mod: S$GLB,,, | Performed by: PSYCHIATRY & NEUROLOGY

## 2022-11-17 PROCEDURE — 3008F BODY MASS INDEX DOCD: CPT | Mod: CPTII,S$GLB,, | Performed by: PSYCHIATRY & NEUROLOGY

## 2022-11-17 PROCEDURE — 1125F PR PAIN SEVERITY QUANTIFIED, PAIN PRESENT: ICD-10-PCS | Mod: CPTII,S$GLB,, | Performed by: PSYCHIATRY & NEUROLOGY

## 2022-11-17 PROCEDURE — 1159F MED LIST DOCD IN RCRD: CPT | Mod: CPTII,S$GLB,, | Performed by: PSYCHIATRY & NEUROLOGY

## 2022-11-17 PROCEDURE — 1159F PR MEDICATION LIST DOCUMENTED IN MEDICAL RECORD: ICD-10-PCS | Mod: CPTII,S$GLB,, | Performed by: PSYCHIATRY & NEUROLOGY

## 2022-11-17 PROCEDURE — 3074F SYST BP LT 130 MM HG: CPT | Mod: CPTII,S$GLB,, | Performed by: PSYCHIATRY & NEUROLOGY

## 2022-11-17 PROCEDURE — 3288F FALL RISK ASSESSMENT DOCD: CPT | Mod: CPTII,S$GLB,, | Performed by: PSYCHIATRY & NEUROLOGY

## 2022-11-17 PROCEDURE — 99999 PR PBB SHADOW E&M-EST. PATIENT-LVL III: ICD-10-PCS | Mod: PBBFAC,,, | Performed by: PSYCHIATRY & NEUROLOGY

## 2022-11-17 PROCEDURE — 99999 PR PBB SHADOW E&M-EST. PATIENT-LVL III: CPT | Mod: PBBFAC,,, | Performed by: PSYCHIATRY & NEUROLOGY

## 2022-11-17 PROCEDURE — 3079F DIAST BP 80-89 MM HG: CPT | Mod: CPTII,S$GLB,, | Performed by: PSYCHIATRY & NEUROLOGY

## 2022-11-17 PROCEDURE — 3044F PR MOST RECENT HEMOGLOBIN A1C LEVEL <7.0%: ICD-10-PCS | Mod: CPTII,S$GLB,, | Performed by: PSYCHIATRY & NEUROLOGY

## 2022-11-17 PROCEDURE — 3044F HG A1C LEVEL LT 7.0%: CPT | Mod: CPTII,S$GLB,, | Performed by: PSYCHIATRY & NEUROLOGY

## 2022-11-17 PROCEDURE — 1125F AMNT PAIN NOTED PAIN PRSNT: CPT | Mod: CPTII,S$GLB,, | Performed by: PSYCHIATRY & NEUROLOGY

## 2022-11-17 NOTE — PROGRESS NOTES
Shirin Evans is a 72 y.o. year old female that  presents for routine neuro follow up.    HPI:  Mrs Evans has HTN, bilateral CTS, DVT, PE, and s/p bilateral craniotomies for evacuation of subdural hematoma on 6/4/21, followed by left MMA embolization on 6/6/21.  At the time of her last visit on 5/4/22 the main issue was the development of mixed type HA which she tells me today is substantially improved with the use of gabapentin. Said that the headaches are not only diminishing in frequency but they are also less intense.   Reports no vertigo, double vision, difficulty swallowing, imbalance, focal weakness, numbness, tremors, memory loss, slurred speech, language or vision impairment.        Past Medical History:   Diagnosis Date    Carpal tunnel syndrome, bilateral 2/3/2022    Headache     Hypertension     Movement disorder     Pulmonary embolism     Syncope and collapse     Weakness of right lower extremity 6/3/2021     Social History     Socioeconomic History    Marital status: Single   Occupational History    Occupation: Retired    Tobacco Use    Smoking status: Never    Smokeless tobacco: Never   Substance and Sexual Activity    Alcohol use: Not Currently    Drug use: Not Currently    Sexual activity: Not Currently     Past Surgical History:   Procedure Laterality Date    BREAST BIOPSY Left     years ago, benign per pt    CARPAL TUNNEL RELEASE Right 05/23/2022    Procedure: RELEASE, CARPAL TUNNEL,RIGHT;  Surgeon: Angelica Aguero MD;  Location: [facility];  Service: Orthopedics;  Laterality: Right;    CEREBRAL ANGIOGRAM N/A 06/06/2021    Procedure: ANGIOGRAM-CEREBRAL; bilateral MMA embolization;  Surgeon: Ramon Che MD;  Location: [address];  Service: Neurosurgery;  Laterality: N/A;    CRANIOTOMY FOR EVACUATION OF SUBDURAL HEMATOMA Bilateral 06/04/2021    Procedure: CRANIOTOMY, FOR SUBDURAL HEMATOMA EVACUATION;  Surgeon: Maribell Michaud MD;  Location: [facility]  "[facility];  Service: Neurosurgery;  Laterality: Bilateral;     Family History   Problem Relation Age of Onset    Heart attacks under age 50 Mother 48    Depression Mother     Atrial fibrillation Father     Hypertension Father     Cancer Father     Heart disease Father     Deep vein thrombosis Sister         Unprovoked    Stomach cancer Maternal Grandmother     Aneurysm Neg Hx     Dementia Neg Hx     Diabetes Neg Hx     Fainting Neg Hx     Kidney disease Neg Hx     Liver disease Neg Hx     Migraines Neg Hx     Neuropathy Neg Hx     Parkinsonism Neg Hx     Seizures Neg Hx     Stroke Neg Hx     Tremor Neg Hx            Review of Systems  General ROS: negative for chills, fever or weight loss  Psychological ROS: negative for hallucination, depression or suicidal ideation  Ophthalmic ROS: negative for blurry vision, photophobia or eye pain  ENT ROS: negative for epistaxis, sore throat or rhinorrhea  Respiratory ROS: no cough, shortness of breath, or wheezing  Cardiovascular ROS: no chest pain or dyspnea on exertion  Gastrointestinal ROS: no abdominal pain, change in bowel habits, or black/ bloody stools  Genito-Urinary ROS: no dysuria, trouble voiding, or hematuria  Musculoskeletal ROS: negative for gait disturbance or muscular weakness  Neurological ROS: no syncope or seizures; no ataxia  Dermatological ROS: negative for pruritis, rash and jaundice      Physical Exam:  /82   Pulse 62   Ht 5' 3" (1.6 m)   Wt 72.1 kg (158 lb 15.2 oz)   LMP  (LMP Unknown)   BMI 28.16 kg/m²   General appearance: alert, cooperative, no distress  Constitutional:Oriented to person, place, and time.appears well-developed and well-nourished.   HEENT: Normocephalic, atraumatic, neck symmetrical, no nasal discharge   Eyes: conjunctivae/corneas clear, PERRL, EOM's intact  Lungs: clear to auscultation bilaterally, no dullness to percussion bilaterally  Heart: regular rate and rhythm without rub; no displacement of the PMI   Abdomen: soft, " non-tender; bowel sounds normoactive; no organomegaly  Extremities: extremities symmetric; no clubbing, cyanosis, or edema  Integument: Skin color, texture, turgor normal; no rashes; hair distrubution normal  Neurologic:   Mental status: alert and awake, oriented x 4, comprehension, naming, and repetition intact. No right to left confusion. Performs serial 7's without difficulty .No dysarthria.  CN 2-12: pupils 4 mm bilaterally, reactive to light. Fundi without papilledema. Visual fields full to confrontation. EOM full without nystagmus. Face sensation normal in all distributions. Face symmetric. Hearing grossly intact. Palate elevates well. Tongue midline without atrophy or fasciculations.  Motor: 5/5 all over  Sensory: intact in all modalities.  DTR's: 2+ all over.  Plantars: no tested.  Coordination: finger to nose and heel-knee-shin intact.  Gait: no ataxia or bradykinesia     LABS:    Complete Blood Count  Lab Results   Component Value Date    RBC 4.17 08/09/2022    HGB 12.4 08/09/2022    HCT 37.7 08/09/2022    MCV 90 08/09/2022    MCH 29.7 08/09/2022    MCHC 32.9 08/09/2022    RDW 13.8 08/09/2022     08/09/2022    MPV 9.9 08/09/2022    GRAN 2.7 08/09/2022    GRAN 61.5 08/09/2022    LYMPH 1.3 08/09/2022    LYMPH 28.8 08/09/2022    MONO 0.3 08/09/2022    MONO 7.1 08/09/2022    EOS 0.1 08/09/2022    BASO 0.03 08/09/2022    EOSINOPHIL 1.4 08/09/2022    BASOPHIL 0.7 08/09/2022    DIFFMETHOD Automated 08/09/2022       Comprehensive Metabolic Panel  Lab Results   Component Value Date    GLU 95 08/09/2022    BUN 12 08/09/2022    CREATININE 0.7 08/09/2022     08/09/2022    K 4.1 08/09/2022     08/09/2022    PROT 7.0 08/09/2022    ALBUMIN 4.1 08/09/2022    BILITOT 0.6 08/09/2022    AST 16 08/09/2022    ALKPHOS 64 08/09/2022    CO2 27 08/09/2022    ALT 11 08/09/2022    ANIONGAP 9 08/09/2022    EGFRNONAA >60.0 06/12/2021    ESTGFRAFRICA >60.0 06/12/2021       TSH  Lab Results   Component Value Date     TSH 1.826 08/09/2022         Assessment: 73 y/o with HTN, bilateral CTS, DVT, PE, and s/p bilateral craniotomies for evacuation of subdural hematoma on 6/4/21, followed by left MMA embolization on 6/6/21, presents for follow up of HA with a mixed pattern that are significantly improved.  Continue gabapentin.  No other active neurological issues today.      No diagnosis found.  There were no encounter diagnoses.      Plan:  1) HA, mixed type: as above  2) HTN  3) DVT/PE (resolved)  4) s/p bilateral craniotomies for evacuation of subdural hematoma on 6/4/21  5) S/p left MMA embolization on 6/6/21  6) Bilateral CTS    No orders of the defined types were placed in this encounter.          Drake Pride MD

## 2023-01-26 ENCOUNTER — LAB VISIT (OUTPATIENT)
Dept: LAB | Facility: HOSPITAL | Age: 73
End: 2023-01-26
Attending: INTERNAL MEDICINE
Payer: MEDICARE

## 2023-01-26 ENCOUNTER — OFFICE VISIT (OUTPATIENT)
Dept: INTERNAL MEDICINE | Facility: CLINIC | Age: 73
End: 2023-01-26
Payer: MEDICARE

## 2023-01-26 VITALS
HEART RATE: 82 BPM | HEIGHT: 63 IN | DIASTOLIC BLOOD PRESSURE: 62 MMHG | OXYGEN SATURATION: 99 % | SYSTOLIC BLOOD PRESSURE: 128 MMHG | WEIGHT: 162.5 LBS | BODY MASS INDEX: 28.79 KG/M2

## 2023-01-26 DIAGNOSIS — Z12.11 SCREENING FOR COLON CANCER: ICD-10-CM

## 2023-01-26 DIAGNOSIS — D50.9 IRON DEFICIENCY ANEMIA, UNSPECIFIED IRON DEFICIENCY ANEMIA TYPE: ICD-10-CM

## 2023-01-26 DIAGNOSIS — R51.9 CHRONIC NONINTRACTABLE HEADACHE, UNSPECIFIED HEADACHE TYPE: ICD-10-CM

## 2023-01-26 DIAGNOSIS — G56.01 CARPAL TUNNEL SYNDROME OF RIGHT WRIST: ICD-10-CM

## 2023-01-26 DIAGNOSIS — Z86.718 HISTORY OF DVT (DEEP VEIN THROMBOSIS): ICD-10-CM

## 2023-01-26 DIAGNOSIS — E78.2 MIXED HYPERLIPIDEMIA: ICD-10-CM

## 2023-01-26 DIAGNOSIS — R73.03 PREDIABETES: ICD-10-CM

## 2023-01-26 DIAGNOSIS — E53.8 VITAMIN B12 DEFICIENCY: ICD-10-CM

## 2023-01-26 DIAGNOSIS — Z86.79 HISTORY OF SUBDURAL HEMATOMA: ICD-10-CM

## 2023-01-26 DIAGNOSIS — Z00.00 ENCOUNTER FOR ANNUAL PHYSICAL EXAM: Primary | ICD-10-CM

## 2023-01-26 DIAGNOSIS — Z86.711 HISTORY OF PULMONARY EMBOLISM: ICD-10-CM

## 2023-01-26 DIAGNOSIS — G89.29 CHRONIC NONINTRACTABLE HEADACHE, UNSPECIFIED HEADACHE TYPE: ICD-10-CM

## 2023-01-26 LAB
ESTIMATED AVG GLUCOSE: 117 MG/DL (ref 68–131)
HBA1C MFR BLD: 5.7 % (ref 4–5.6)

## 2023-01-26 PROCEDURE — 1101F PR PT FALLS ASSESS DOC 0-1 FALLS W/OUT INJ PAST YR: ICD-10-PCS | Mod: CPTII,S$GLB,, | Performed by: INTERNAL MEDICINE

## 2023-01-26 PROCEDURE — 1101F PT FALLS ASSESS-DOCD LE1/YR: CPT | Mod: CPTII,S$GLB,, | Performed by: INTERNAL MEDICINE

## 2023-01-26 PROCEDURE — 3008F PR BODY MASS INDEX (BMI) DOCUMENTED: ICD-10-PCS | Mod: CPTII,S$GLB,, | Performed by: INTERNAL MEDICINE

## 2023-01-26 PROCEDURE — 1126F AMNT PAIN NOTED NONE PRSNT: CPT | Mod: CPTII,S$GLB,, | Performed by: INTERNAL MEDICINE

## 2023-01-26 PROCEDURE — 83036 HEMOGLOBIN GLYCOSYLATED A1C: CPT | Performed by: INTERNAL MEDICINE

## 2023-01-26 PROCEDURE — 99397 PR PREVENTIVE VISIT,EST,65 & OVER: ICD-10-PCS | Mod: S$GLB,,, | Performed by: INTERNAL MEDICINE

## 2023-01-26 PROCEDURE — 99999 PR PBB SHADOW E&M-EST. PATIENT-LVL III: CPT | Mod: PBBFAC,,, | Performed by: INTERNAL MEDICINE

## 2023-01-26 PROCEDURE — 3078F PR MOST RECENT DIASTOLIC BLOOD PRESSURE < 80 MM HG: ICD-10-PCS | Mod: CPTII,S$GLB,, | Performed by: INTERNAL MEDICINE

## 2023-01-26 PROCEDURE — 1159F PR MEDICATION LIST DOCUMENTED IN MEDICAL RECORD: ICD-10-PCS | Mod: CPTII,S$GLB,, | Performed by: INTERNAL MEDICINE

## 2023-01-26 PROCEDURE — 3288F FALL RISK ASSESSMENT DOCD: CPT | Mod: CPTII,S$GLB,, | Performed by: INTERNAL MEDICINE

## 2023-01-26 PROCEDURE — 3074F PR MOST RECENT SYSTOLIC BLOOD PRESSURE < 130 MM HG: ICD-10-PCS | Mod: CPTII,S$GLB,, | Performed by: INTERNAL MEDICINE

## 2023-01-26 PROCEDURE — 1126F PR PAIN SEVERITY QUANTIFIED, NO PAIN PRESENT: ICD-10-PCS | Mod: CPTII,S$GLB,, | Performed by: INTERNAL MEDICINE

## 2023-01-26 PROCEDURE — 3078F DIAST BP <80 MM HG: CPT | Mod: CPTII,S$GLB,, | Performed by: INTERNAL MEDICINE

## 2023-01-26 PROCEDURE — 3008F BODY MASS INDEX DOCD: CPT | Mod: CPTII,S$GLB,, | Performed by: INTERNAL MEDICINE

## 2023-01-26 PROCEDURE — 99397 PER PM REEVAL EST PAT 65+ YR: CPT | Mod: S$GLB,,, | Performed by: INTERNAL MEDICINE

## 2023-01-26 PROCEDURE — 36415 COLL VENOUS BLD VENIPUNCTURE: CPT | Performed by: INTERNAL MEDICINE

## 2023-01-26 PROCEDURE — 1159F MED LIST DOCD IN RCRD: CPT | Mod: CPTII,S$GLB,, | Performed by: INTERNAL MEDICINE

## 2023-01-26 PROCEDURE — 3288F PR FALLS RISK ASSESSMENT DOCUMENTED: ICD-10-PCS | Mod: CPTII,S$GLB,, | Performed by: INTERNAL MEDICINE

## 2023-01-26 PROCEDURE — 99999 PR PBB SHADOW E&M-EST. PATIENT-LVL III: ICD-10-PCS | Mod: PBBFAC,,, | Performed by: INTERNAL MEDICINE

## 2023-01-26 PROCEDURE — 3074F SYST BP LT 130 MM HG: CPT | Mod: CPTII,S$GLB,, | Performed by: INTERNAL MEDICINE

## 2023-01-26 RX ORDER — FERROUS SULFATE 325(65) MG
325 TABLET ORAL EVERY OTHER DAY
Qty: 45 TABLET | Refills: 3 | Status: SHIPPED | OUTPATIENT
Start: 2023-01-26 | End: 2023-07-06 | Stop reason: SDUPTHER

## 2023-01-26 RX ORDER — ROSUVASTATIN CALCIUM 20 MG/1
20 TABLET, COATED ORAL NIGHTLY
Qty: 90 TABLET | Refills: 3 | Status: SHIPPED | OUTPATIENT
Start: 2023-01-26 | End: 2024-01-09 | Stop reason: SDUPTHER

## 2023-01-26 NOTE — PATIENT INSTRUCTIONS
Labwork today to check your prediabetes  Pneumonia shot today    Immunizations recommended  Shingles vaccination - two shot series, 2-6 months apart  Pneumonia vaccination  Covid-19 bivalent booster    Return to clinic in 6 months or sooner if needed.

## 2023-01-26 NOTE — PROGRESS NOTES
Subjective:       Patient ID: Shirin Evans is a 72 y.o. female.    Chief Complaint: Follow-up      HPI  Shirin Evans is a 72 y.o. year old female with history of subdural hematoma s/p craniotomy, residual chronic headaches intermittent nonintractable, CTS s/p release of R hand, HTN (lifestyle controlled), HLD presents for follow up. Since last visit, has had no new issues. Does feel the weather changes, does get a dull headache, fatigue when this occurs, relieves spontaneously after the weather improves.     Review of Systems   Constitutional:  Negative for activity change, appetite change, fatigue, fever and unexpected weight change.   HENT:  Negative for congestion, hearing loss, postnasal drip, sneezing, sore throat, trouble swallowing and voice change.    Eyes:  Negative for pain and discharge.   Respiratory:  Negative for cough, choking, chest tightness, shortness of breath and wheezing.    Cardiovascular:  Negative for chest pain, palpitations and leg swelling.   Gastrointestinal:  Negative for abdominal distention, abdominal pain, blood in stool, constipation, diarrhea, nausea and vomiting.   Endocrine: Negative for polydipsia and polyuria.   Genitourinary:  Negative for difficulty urinating and flank pain.   Musculoskeletal:  Negative for arthralgias, back pain, joint swelling, myalgias and neck pain.   Skin:  Negative for rash.   Neurological:  Positive for headaches. Negative for dizziness, tremors, seizures, weakness, light-headedness and numbness.   Psychiatric/Behavioral:  Negative for agitation. The patient is not nervous/anxious.        Past Medical History:   Diagnosis Date    Carpal tunnel syndrome, bilateral 2/3/2022    Headache     Hypertension     Movement disorder     Pulmonary embolism     Syncope and collapse     Weakness of right lower extremity 6/3/2021        Prior to Admission medications    Medication Sig Start Date End Date Taking? Authorizing Provider   acetaminophen  (TYLENOL) 325 MG tablet Take 2 tablets (650 mg total) by mouth every 4 (four) hours as needed. 6/11/21  Yes Jojo Sharma PA-C   calcium carbonate (OS-SANDER) 500 mg calcium (1,250 mg) chewable tablet  9/19/21  Yes Historical Provider   cyanocobalamin (VITAMIN B-12) 1000 MCG tablet Take 1 tablet (1,000 mcg total) by mouth once daily. 6/30/21  Yes Chris Villanueva MD   doxycycline (ORACEA) 40 mg capsule Take 40 mg by mouth daily as needed.   Yes Historical Provider   ergocalciferol, vitamin D2, (VITAMIN D ORAL)  9/19/21  Yes Historical Provider   gabapentin (NEURONTIN) 100 MG capsule TAKE 1 CAPSULE BY MOUTH EVERY MORNING AND 3 CAPSULES EVERY EVENING AS DIRECTED 7/15/22  Yes Drake Pride MD   ondansetron (ZOFRAN) 4 MG tablet Take 1 tablet (4 mg total) by mouth every 8 (eight) hours as needed for Nausea. 5/23/22 7/26/22 Yes MELI Estevez   rosuvastatin (CRESTOR) 20 MG tablet TAKE 1 TABLET(20 MG) BY MOUTH DAILY 11/1/21 7/26/22 Yes Chris Villanueva MD   ferrous sulfate (FEOSOL) 325 mg (65 mg iron) Tab tablet Take 1 tablet (325 mg total) by mouth every other day. 7/26/22   David Siegel MD   rosuvastatin (CRESTOR) 20 MG tablet Take 1 tablet (20 mg total) by mouth every evening. 7/26/22   David Siegel MD   clopidogreL (PLAVIX) 75 mg tablet Take 1 tablet (75 mg total) by mouth once daily. for 21 days 6/3/21 6/3/21  Savanah Casarez MD   ferrous sulfate (FEOSOL) 325 mg (65 mg iron) Tab tablet Take 1 tablet (325 mg total) by mouth every other day.  Patient not taking: Reported on 7/26/2022 6/2/21 7/26/22  Chris Villanueva MD   traMADoL (ULTRAM) 50 mg tablet Take 1 tablet (50 mg total) by mouth every 6 (six) hours as needed for Pain.  Patient not taking: Reported on 7/26/2022 5/20/22 7/26/22  MELI Estevez        Past medical history, surgical history, and family medical history reviewed and updated as appropriate.    Medications and allergies reviewed.     Objective:          Vitals:    01/26/23 1050   BP:  "128/62   BP Location: Right arm   Patient Position: Sitting   Pulse: 82   SpO2: 99%   Weight: 73.7 kg (162 lb 7.7 oz)   Height: 5' 3" (1.6 m)     Body mass index is 28.78 kg/m².  Physical Exam  Constitutional:       Appearance: Normal appearance. She is well-developed.   HENT:      Head: Atraumatic.      Comments: S/p craniotomy x 2, well healed  Eyes:      Extraocular Movements: Extraocular movements intact.      Pupils: Pupils are equal, round, and reactive to light.   Cardiovascular:      Rate and Rhythm: Normal rate and regular rhythm.      Heart sounds: Normal heart sounds.   Pulmonary:      Effort: Pulmonary effort is normal. No respiratory distress.      Breath sounds: Normal breath sounds. No wheezing.   Abdominal:      General: There is no distension.      Palpations: Abdomen is soft.      Tenderness: There is no abdominal tenderness.   Musculoskeletal:         General: No tenderness. Normal range of motion.      Cervical back: Normal range of motion and neck supple. No tenderness.   Lymphadenopathy:      Cervical: No cervical adenopathy.   Skin:     General: Skin is warm and dry.   Neurological:      Mental Status: She is alert and oriented to person, place, and time.      Cranial Nerves: No cranial nerve deficit.      Deep Tendon Reflexes: Reflexes are normal and symmetric.       Lab Results   Component Value Date    WBC 4.38 08/09/2022    HGB 12.4 08/09/2022    HCT 37.7 08/09/2022     08/09/2022    CHOL 113 (L) 08/09/2022    TRIG 47 08/09/2022    HDL 55 08/09/2022    ALT 11 08/09/2022    AST 16 08/09/2022     08/09/2022    K 4.1 08/09/2022     08/09/2022    CREATININE 0.7 08/09/2022    BUN 12 08/09/2022    CO2 27 08/09/2022    TSH 1.826 08/09/2022    INR 1.0 06/03/2021    HGBA1C 5.7 (H) 08/09/2022       Assessment:       1. History of pulmonary embolism    2. Subdural hematoma    3. Carpal tunnel syndrome of right wrist    4. History of subdural hematoma    5. History of DVT (deep vein " thrombosis)    6. Mixed hyperlipidemia    7. Iron deficiency anemia, unspecified iron deficiency anemia type    8. Vitamin B12 deficiency    9. Prediabetes            Plan:     Shirin was seen today for follow-up.    Diagnoses and all orders for this visit:    Encounter for annual physical exam    History of subdural hematoma  Comments:  6/2021 - Dr. Michaud, subdural hematoma evacuation    Chronic nonintractable headache, unspecified headache type  Comments:  since craniotomy; relieves with tylenol    Mixed hyperlipidemia  Comments:  on crestor 20, no change to management, recheck lipid panel  Orders:  -     rosuvastatin (CRESTOR) 20 MG tablet; Take 1 tablet (20 mg total) by mouth every evening.    Iron deficiency anemia, unspecified iron deficiency anemia type  Comments:  on ferrous sulfate 325 every other day, recheck iron panel  Orders:  -     ferrous sulfate (FEOSOL) 325 mg (65 mg iron) Tab tablet; Take 1 tablet (325 mg total) by mouth every other day.    Vitamin B12 deficiency    Carpal tunnel syndrome of right wrist  Comments:  s/p carpal tunnel release, resolved.     History of DVT (deep vein thrombosis)  Comments:  resolved    History of pulmonary embolism  Comments:  resolved    Prediabetes  -     HEMOGLOBIN A1C; Future    Screening for colon cancer  Comments:  fitikit ordered  Orders:  -     Fecal Immunochemical Test (iFOBT); Future      Benign physical examination, no issues identified.     Health maintenance reviewed with patient. Patient is due for covid-19 bivalent booster, shingles series, pneumonia vaccination.     Due for colon cancer screening    Follow up in about 6 months (around 7/26/2023).    David Siegel MD  Internal Medicine / Primary Care  [facility]  1/26/2023

## 2023-02-07 ENCOUNTER — LAB VISIT (OUTPATIENT)
Dept: LAB | Facility: HOSPITAL | Age: 73
End: 2023-02-07
Attending: INTERNAL MEDICINE
Payer: MEDICARE

## 2023-02-07 DIAGNOSIS — Z12.11 SCREENING FOR COLON CANCER: ICD-10-CM

## 2023-02-07 PROCEDURE — 82274 ASSAY TEST FOR BLOOD FECAL: CPT | Performed by: INTERNAL MEDICINE

## 2023-02-14 LAB — HEMOCCULT STL QL IA: NEGATIVE

## 2023-06-13 RX ORDER — GABAPENTIN 100 MG/1
CAPSULE ORAL
Qty: 120 CAPSULE | Refills: 5 | Status: SHIPPED | OUTPATIENT
Start: 2023-06-13 | End: 2024-01-04

## 2023-07-06 ENCOUNTER — LAB VISIT (OUTPATIENT)
Dept: LAB | Facility: HOSPITAL | Age: 73
End: 2023-07-06
Attending: INTERNAL MEDICINE
Payer: MEDICARE

## 2023-07-06 ENCOUNTER — IMMUNIZATION (OUTPATIENT)
Dept: INTERNAL MEDICINE | Facility: CLINIC | Age: 73
End: 2023-07-06
Payer: MEDICARE

## 2023-07-06 ENCOUNTER — OFFICE VISIT (OUTPATIENT)
Dept: INTERNAL MEDICINE | Facility: CLINIC | Age: 73
End: 2023-07-06
Payer: MEDICARE

## 2023-07-06 VITALS
OXYGEN SATURATION: 99 % | BODY MASS INDEX: 28.08 KG/M2 | DIASTOLIC BLOOD PRESSURE: 76 MMHG | HEART RATE: 74 BPM | HEIGHT: 63 IN | SYSTOLIC BLOOD PRESSURE: 122 MMHG | WEIGHT: 158.5 LBS

## 2023-07-06 DIAGNOSIS — Z12.31 BREAST CANCER SCREENING BY MAMMOGRAM: ICD-10-CM

## 2023-07-06 DIAGNOSIS — Z01.419 WELL WOMAN EXAM: ICD-10-CM

## 2023-07-06 DIAGNOSIS — R73.03 PREDIABETES: ICD-10-CM

## 2023-07-06 DIAGNOSIS — G56.01 CARPAL TUNNEL SYNDROME OF RIGHT WRIST: ICD-10-CM

## 2023-07-06 DIAGNOSIS — Z86.711 HISTORY OF PULMONARY EMBOLISM: ICD-10-CM

## 2023-07-06 DIAGNOSIS — E53.8 VITAMIN B12 DEFICIENCY: ICD-10-CM

## 2023-07-06 DIAGNOSIS — Z23 NEED FOR VACCINATION: Primary | ICD-10-CM

## 2023-07-06 DIAGNOSIS — I10 ESSENTIAL HYPERTENSION: ICD-10-CM

## 2023-07-06 DIAGNOSIS — D50.9 IRON DEFICIENCY ANEMIA, UNSPECIFIED IRON DEFICIENCY ANEMIA TYPE: ICD-10-CM

## 2023-07-06 DIAGNOSIS — I25.10 ATHEROSCLEROSIS OF CORONARY ARTERY, UNSPECIFIED VESSEL OR LESION TYPE, UNSPECIFIED WHETHER ANGINA PRESENT, UNSPECIFIED WHETHER NATIVE OR TRANSPLANTED HEART: ICD-10-CM

## 2023-07-06 DIAGNOSIS — E78.2 MIXED HYPERLIPIDEMIA: ICD-10-CM

## 2023-07-06 DIAGNOSIS — Z86.79 HISTORY OF SUBDURAL HEMATOMA: ICD-10-CM

## 2023-07-06 DIAGNOSIS — Z09 FOLLOW-UP EXAM: Primary | ICD-10-CM

## 2023-07-06 DIAGNOSIS — Z23 NEED FOR SHINGLES VACCINE: ICD-10-CM

## 2023-07-06 DIAGNOSIS — Z86.718 HISTORY OF DVT (DEEP VEIN THROMBOSIS): ICD-10-CM

## 2023-07-06 DIAGNOSIS — E55.9 VITAMIN D DEFICIENCY: ICD-10-CM

## 2023-07-06 LAB
ALBUMIN SERPL BCP-MCNC: 4.4 G/DL (ref 3.5–5.2)
ALP SERPL-CCNC: 77 U/L (ref 55–135)
ALT SERPL W/O P-5'-P-CCNC: 11 U/L (ref 10–44)
ANION GAP SERPL CALC-SCNC: 13 MMOL/L (ref 8–16)
AST SERPL-CCNC: 21 U/L (ref 10–40)
BASOPHILS # BLD AUTO: 0.05 K/UL (ref 0–0.2)
BASOPHILS NFR BLD: 1.1 % (ref 0–1.9)
BILIRUB SERPL-MCNC: 0.6 MG/DL (ref 0.1–1)
BUN SERPL-MCNC: 9 MG/DL (ref 8–23)
CALCIUM SERPL-MCNC: 10.4 MG/DL (ref 8.7–10.5)
CHLORIDE SERPL-SCNC: 103 MMOL/L (ref 95–110)
CHOLEST SERPL-MCNC: 137 MG/DL (ref 120–199)
CHOLEST/HDLC SERPL: 2.2 {RATIO} (ref 2–5)
CO2 SERPL-SCNC: 25 MMOL/L (ref 23–29)
CREAT SERPL-MCNC: 0.7 MG/DL (ref 0.5–1.4)
DIFFERENTIAL METHOD: NORMAL
EOSINOPHIL # BLD AUTO: 0.1 K/UL (ref 0–0.5)
EOSINOPHIL NFR BLD: 1.3 % (ref 0–8)
ERYTHROCYTE [DISTWIDTH] IN BLOOD BY AUTOMATED COUNT: 13.2 % (ref 11.5–14.5)
EST. GFR  (NO RACE VARIABLE): >60 ML/MIN/1.73 M^2
ESTIMATED AVG GLUCOSE: 111 MG/DL (ref 68–131)
GLUCOSE SERPL-MCNC: 87 MG/DL (ref 70–110)
HBA1C MFR BLD: 5.5 % (ref 4–5.6)
HCT VFR BLD AUTO: 41.1 % (ref 37–48.5)
HDLC SERPL-MCNC: 61 MG/DL (ref 40–75)
HDLC SERPL: 44.5 % (ref 20–50)
HGB BLD-MCNC: 13.6 G/DL (ref 12–16)
IMM GRANULOCYTES # BLD AUTO: 0.01 K/UL (ref 0–0.04)
IMM GRANULOCYTES NFR BLD AUTO: 0.2 % (ref 0–0.5)
LDLC SERPL CALC-MCNC: 62.4 MG/DL (ref 63–159)
LYMPHOCYTES # BLD AUTO: 1.8 K/UL (ref 1–4.8)
LYMPHOCYTES NFR BLD: 39.7 % (ref 18–48)
MCH RBC QN AUTO: 30.2 PG (ref 27–31)
MCHC RBC AUTO-ENTMCNC: 33.1 G/DL (ref 32–36)
MCV RBC AUTO: 91 FL (ref 82–98)
MONOCYTES # BLD AUTO: 0.3 K/UL (ref 0.3–1)
MONOCYTES NFR BLD: 7.2 % (ref 4–15)
NEUTROPHILS # BLD AUTO: 2.3 K/UL (ref 1.8–7.7)
NEUTROPHILS NFR BLD: 50.5 % (ref 38–73)
NONHDLC SERPL-MCNC: 76 MG/DL
NRBC BLD-RTO: 0 /100 WBC
PLATELET # BLD AUTO: 195 K/UL (ref 150–450)
PMV BLD AUTO: 10.1 FL (ref 9.2–12.9)
POTASSIUM SERPL-SCNC: 4 MMOL/L (ref 3.5–5.1)
PROT SERPL-MCNC: 7.5 G/DL (ref 6–8.4)
RBC # BLD AUTO: 4.5 M/UL (ref 4–5.4)
SODIUM SERPL-SCNC: 141 MMOL/L (ref 136–145)
TRIGL SERPL-MCNC: 68 MG/DL (ref 30–150)
TSH SERPL DL<=0.005 MIU/L-ACNC: 2.25 UIU/ML (ref 0.4–4)
WBC # BLD AUTO: 4.56 K/UL (ref 3.9–12.7)

## 2023-07-06 PROCEDURE — 1101F PR PT FALLS ASSESS DOC 0-1 FALLS W/OUT INJ PAST YR: ICD-10-PCS | Mod: CPTII,S$GLB,, | Performed by: INTERNAL MEDICINE

## 2023-07-06 PROCEDURE — 84443 ASSAY THYROID STIM HORMONE: CPT | Performed by: INTERNAL MEDICINE

## 2023-07-06 PROCEDURE — 3288F FALL RISK ASSESSMENT DOCD: CPT | Mod: CPTII,S$GLB,, | Performed by: INTERNAL MEDICINE

## 2023-07-06 PROCEDURE — 3008F PR BODY MASS INDEX (BMI) DOCUMENTED: ICD-10-PCS | Mod: CPTII,S$GLB,, | Performed by: INTERNAL MEDICINE

## 2023-07-06 PROCEDURE — 83036 HEMOGLOBIN GLYCOSYLATED A1C: CPT | Performed by: INTERNAL MEDICINE

## 2023-07-06 PROCEDURE — 99214 OFFICE O/P EST MOD 30 MIN: CPT | Mod: 25,S$GLB,, | Performed by: INTERNAL MEDICINE

## 2023-07-06 PROCEDURE — 3074F SYST BP LT 130 MM HG: CPT | Mod: CPTII,S$GLB,, | Performed by: INTERNAL MEDICINE

## 2023-07-06 PROCEDURE — 99214 PR OFFICE/OUTPT VISIT, EST, LEVL IV, 30-39 MIN: ICD-10-PCS | Mod: 25,S$GLB,, | Performed by: INTERNAL MEDICINE

## 2023-07-06 PROCEDURE — 85025 COMPLETE CBC W/AUTO DIFF WBC: CPT | Performed by: INTERNAL MEDICINE

## 2023-07-06 PROCEDURE — 3288F PR FALLS RISK ASSESSMENT DOCUMENTED: ICD-10-PCS | Mod: CPTII,S$GLB,, | Performed by: INTERNAL MEDICINE

## 2023-07-06 PROCEDURE — 36415 COLL VENOUS BLD VENIPUNCTURE: CPT | Performed by: INTERNAL MEDICINE

## 2023-07-06 PROCEDURE — 99999 PR PBB SHADOW E&M-EST. PATIENT-LVL III: ICD-10-PCS | Mod: PBBFAC,,, | Performed by: INTERNAL MEDICINE

## 2023-07-06 PROCEDURE — 3044F PR MOST RECENT HEMOGLOBIN A1C LEVEL <7.0%: ICD-10-PCS | Mod: CPTII,S$GLB,, | Performed by: INTERNAL MEDICINE

## 2023-07-06 PROCEDURE — 1126F AMNT PAIN NOTED NONE PRSNT: CPT | Mod: CPTII,S$GLB,, | Performed by: INTERNAL MEDICINE

## 2023-07-06 PROCEDURE — 1126F PR PAIN SEVERITY QUANTIFIED, NO PAIN PRESENT: ICD-10-PCS | Mod: CPTII,S$GLB,, | Performed by: INTERNAL MEDICINE

## 2023-07-06 PROCEDURE — 3078F DIAST BP <80 MM HG: CPT | Mod: CPTII,S$GLB,, | Performed by: INTERNAL MEDICINE

## 2023-07-06 PROCEDURE — 80053 COMPREHEN METABOLIC PANEL: CPT | Performed by: INTERNAL MEDICINE

## 2023-07-06 PROCEDURE — 99999 PR PBB SHADOW E&M-EST. PATIENT-LVL III: CPT | Mod: PBBFAC,,, | Performed by: INTERNAL MEDICINE

## 2023-07-06 PROCEDURE — 80061 LIPID PANEL: CPT | Performed by: INTERNAL MEDICINE

## 2023-07-06 PROCEDURE — 91312 COVID-19, MRNA, LNP-S, BIVALENT BOOSTER, PF, 30 MCG/0.3 ML DOSE: CPT | Mod: S$GLB,,, | Performed by: INTERNAL MEDICINE

## 2023-07-06 PROCEDURE — 1101F PT FALLS ASSESS-DOCD LE1/YR: CPT | Mod: CPTII,S$GLB,, | Performed by: INTERNAL MEDICINE

## 2023-07-06 PROCEDURE — 3078F PR MOST RECENT DIASTOLIC BLOOD PRESSURE < 80 MM HG: ICD-10-PCS | Mod: CPTII,S$GLB,, | Performed by: INTERNAL MEDICINE

## 2023-07-06 PROCEDURE — 3008F BODY MASS INDEX DOCD: CPT | Mod: CPTII,S$GLB,, | Performed by: INTERNAL MEDICINE

## 2023-07-06 PROCEDURE — 3074F PR MOST RECENT SYSTOLIC BLOOD PRESSURE < 130 MM HG: ICD-10-PCS | Mod: CPTII,S$GLB,, | Performed by: INTERNAL MEDICINE

## 2023-07-06 PROCEDURE — 3044F HG A1C LEVEL LT 7.0%: CPT | Mod: CPTII,S$GLB,, | Performed by: INTERNAL MEDICINE

## 2023-07-06 PROCEDURE — 91312 COVID-19, MRNA, LNP-S, BIVALENT BOOSTER, PF, 30 MCG/0.3 ML DOSE: ICD-10-PCS | Mod: S$GLB,,, | Performed by: INTERNAL MEDICINE

## 2023-07-06 RX ORDER — FERROUS SULFATE 325(65) MG
325 TABLET ORAL EVERY OTHER DAY
Qty: 45 TABLET | Refills: 3 | Status: SHIPPED | OUTPATIENT
Start: 2023-07-06

## 2023-07-06 NOTE — PROGRESS NOTES
Subjective:       Patient ID: Shirin Evans is a 72 y.o. female.    Chief Complaint: Follow-up and Medication Refill      HPI  Shirin Evans is a 72 y.o. year old female with history of subdural hematoma s/p craniotomy, residual chronic headaches intermittent nonintractable (improving), CTS s/p release of R hand, HTN (lifestyle controlled), HLD presents for follow up. Feels 2000 times better than she felt two years ago since the surgery. Has only needed once or twice in the last few months.     Review of Systems   Constitutional:  Negative for activity change, appetite change, fatigue, fever and unexpected weight change.   HENT:  Negative for congestion, hearing loss, postnasal drip, sneezing, sore throat, trouble swallowing and voice change.    Eyes:  Negative for pain and discharge.   Respiratory:  Negative for cough, choking, chest tightness, shortness of breath and wheezing.    Cardiovascular:  Negative for chest pain, palpitations and leg swelling.   Gastrointestinal:  Negative for abdominal distention, abdominal pain, blood in stool, constipation, diarrhea, nausea and vomiting.   Endocrine: Negative for polydipsia and polyuria.   Genitourinary:  Negative for difficulty urinating and flank pain.   Musculoskeletal:  Negative for arthralgias, back pain, joint swelling, myalgias and neck pain.   Skin:  Negative for rash.   Neurological:  Negative for dizziness, tremors, seizures, weakness, numbness and headaches.   Psychiatric/Behavioral:  Negative for agitation. The patient is not nervous/anxious.        Past Medical History:   Diagnosis Date    Atherosclerosis of coronary artery 3/25/2020    Carpal tunnel syndrome, bilateral 2/3/2022    Headache     Hypertension     Movement disorder     Pulmonary embolism     Syncope and collapse     Weakness of right lower extremity 6/3/2021        Prior to Admission medications    Medication Sig Start Date End Date Taking? Authorizing Provider   acetaminophen  "(TYLENOL) 325 MG tablet Take 2 tablets (650 mg total) by mouth every 4 (four) hours as needed. 6/11/21   Jojo Sharma PA-C   calcium carbonate (OS-SANDER) 500 mg calcium (1,250 mg) chewable tablet  9/19/21   Historical Provider   cyanocobalamin (VITAMIN B-12) 1000 MCG tablet Take 1 tablet (1,000 mcg total) by mouth once daily. 6/30/21   Chris Villanueva MD   doxycycline (ORACEA) 40 mg capsule Take 40 mg by mouth daily as needed.    Historical Provider   ergocalciferol, vitamin D2, (VITAMIN D ORAL)  9/19/21   Historical Provider   ferrous sulfate (FEOSOL) 325 mg (65 mg iron) Tab tablet Take 1 tablet (325 mg total) by mouth every other day. 1/26/23   David Siegel MD   gabapentin (NEURONTIN) 100 MG capsule TAKE 1 CAPSULE BY MOUTH EVERY MORNING AND TAKE 3 CAPSULES BY MOUTH EVERY EVENING AS DIRECTED 6/13/23   Drake Pride MD   pneumoc 20-jazzy conj-dip cr,PF, (PREVNAR-20, PF,) 0.5 mL Syrg injection Inject 0.5 mLs into the muscle. 1/26/23   Alisha Thomas, PharmD   rosuvastatin (CRESTOR) 20 MG tablet Take 1 tablet (20 mg total) by mouth every evening. 1/26/23   David Siegel MD   clopidogreL (PLAVIX) 75 mg tablet Take 1 tablet (75 mg total) by mouth once daily. for 21 days 6/3/21 6/3/21  Savanah Casarez MD        Past medical history, surgical history, and family medical history reviewed and updated as appropriate.    Medications and allergies reviewed.     Objective:          Vitals:    07/06/23 1115   BP: 122/76   BP Location: Right arm   Patient Position: Sitting   Pulse: 74   SpO2: 99%   Weight: 71.9 kg (158 lb 8.2 oz)   Height: 5' 3" (1.6 m)     Body mass index is 28.08 kg/m².  Physical Exam  Constitutional:       Appearance: She is well-developed.   HENT:      Head: Normocephalic and atraumatic.   Eyes:      Extraocular Movements: Extraocular movements intact.   Cardiovascular:      Rate and Rhythm: Normal rate and regular rhythm.      Heart sounds: Normal heart sounds.   Pulmonary:      Effort: Pulmonary effort is " normal. No respiratory distress.      Breath sounds: Normal breath sounds. No wheezing.   Abdominal:      General: Bowel sounds are normal. There is no distension.      Palpations: Abdomen is soft.      Tenderness: There is no abdominal tenderness.   Musculoskeletal:         General: No tenderness. Normal range of motion.      Cervical back: Normal range of motion.   Skin:     General: Skin is warm and dry.   Neurological:      Mental Status: She is alert and oriented to person, place, and time.      Cranial Nerves: No cranial nerve deficit.      Deep Tendon Reflexes: Reflexes are normal and symmetric.       Lab Results   Component Value Date    WBC 4.38 08/09/2022    HGB 12.4 08/09/2022    HCT 37.7 08/09/2022     08/09/2022    CHOL 113 (L) 08/09/2022    TRIG 47 08/09/2022    HDL 55 08/09/2022    ALT 11 08/09/2022    AST 16 08/09/2022     08/09/2022    K 4.1 08/09/2022     08/09/2022    CREATININE 0.7 08/09/2022    BUN 12 08/09/2022    CO2 27 08/09/2022    TSH 1.826 08/09/2022    INR 1.0 06/03/2021    HGBA1C 5.7 (H) 01/26/2023       Assessment:       1. Follow-up exam    2. Essential hypertension    3. Mixed hyperlipidemia    4. History of subdural hematoma s/p craniotomy    5. History of DVT (deep vein thrombosis)    6. Atherosclerosis of coronary artery, unspecified vessel or lesion type, unspecified whether angina present, unspecified whether native or transplanted heart    7. History of pulmonary embolism    8. Iron deficiency anemia, unspecified iron deficiency anemia type    9. Vitamin B12 deficiency    10. Vitamin D deficiency    11. Carpal tunnel syndrome of right wrist    12. Breast cancer screening by mammogram    13. Well woman exam    14. Prediabetes    15. Iron deficiency anemia, unspecified iron deficiency anemia type    16. Need for shingles vaccine          Plan:     Shirin was seen today for follow-up and medication refill.    Diagnoses and all orders for this visit:    Follow-up  exam    Essential hypertension  Comments:  lifestyle controlled, pt reports averages at home 120s/70s  Orders:  -     CBC Auto Differential; Future  -     Comprehensive Metabolic Panel; Future  -     TSH; Future    Mixed hyperlipidemia  Comments:  on crestor 20 mg daily, controlled, no changes to current management  Orders:  -     Lipid Panel; Future    History of subdural hematoma s/p craniotomy  Comments:  6/2021 - Dr. Michaud, subdural hematoma evacuation    History of DVT (deep vein thrombosis)  Comments:  diagnosed as provoked, was on eliquis >6 months, cleared by heme/onc    Atherosclerosis of coronary artery, unspecified vessel or lesion type, unspecified whether angina present, unspecified whether native or transplanted heart  Comments:  seen on previous imaging, on crestor 20, not on ASA    History of pulmonary embolism  Comments:  diagnosed as provoked, was on eliquis >6 months, cleared by heme/onc    Iron deficiency anemia, unspecified iron deficiency anemia type  Comments:  on iron every other day, last iron panel normal  Orders:  -     ferrous sulfate (FEOSOL) 325 mg (65 mg iron) Tab tablet; Take 1 tablet (325 mg total) by mouth every other day.    Vitamin B12 deficiency  Comments:  on B12 1000 mcg daily    Vitamin D deficiency  Comments:  takes caltrate daily    Carpal tunnel syndrome of right wrist  Comments:  s/p CTS release, doing well    Breast cancer screening by mammogram  -     Mammo Digital Screening Bilat; Future    Well woman exam  Comments:  followed by ob/gyn in [place], will need well woman exam in 2024  Orders:  -     Ambulatory referral/consult to Obstetrics / Gynecology; Future    Prediabetes  -     HEMOGLOBIN A1C; Future    Iron deficiency anemia, unspecified iron deficiency anemia type  Comments:  on ferrous sulfate 325 every other day, recheck iron panel  Orders:  -     ferrous sulfate (FEOSOL) 325 mg (65 mg iron) Tab tablet; Take 1 tablet (325 mg total) by mouth every other  day.    Need for shingles vaccine  -     varicella-zoster [id number], PF, (SHINGRIX) 50 mcg/0.5 mL injection; Inject 0.5 mLs into the muscle once. for 1 dose    Benign physical examination, no issues identified. Will obtain routine labwork and age appropriate health screenings.     Health maintenance reviewed with patient.     Follow up in about 6 months (around 1/6/2024).    David Siegel MD  Internal Medicine / Primary Care  [facility]  7/6/2023

## 2023-07-06 NOTE — PATIENT INSTRUCTIONS
"Schedule mammogram for after 9/22/2023  Schedule covid-19 bivalent booster for today  Fasting labwork today    Referral has been placed for you to schedule with an ob/gyn. Please make sure to schedule our annual well woman exam at your earliest covenience.     Let your pharmacy / clinic know when you need refills of your medications.    Shingles prescription printed, take to any pharmacy to get this done.    Shingles - "Shingrix", two shot series, 2-6 months apart.     Return to clinic in 6 months or sooner if needed.  "

## 2023-09-28 ENCOUNTER — HOSPITAL ENCOUNTER (OUTPATIENT)
Dept: RADIOLOGY | Facility: HOSPITAL | Age: 73
Discharge: HOME | End: 2023-09-28
Attending: INTERNAL MEDICINE
Payer: MEDICARE

## 2023-09-28 DIAGNOSIS — Z12.31 BREAST CANCER SCREENING BY MAMMOGRAM: ICD-10-CM

## 2023-09-28 PROCEDURE — 77067 SCR MAMMO BI INCL CAD: CPT | Mod: 26,,, | Performed by: RADIOLOGY

## 2023-09-28 PROCEDURE — 77067 MAMMO DIGITAL SCREENING BILAT WITH TOMO: ICD-10-PCS | Mod: 26,,, | Performed by: RADIOLOGY

## 2023-09-28 PROCEDURE — 77063 BREAST TOMOSYNTHESIS BI: CPT | Mod: 26,,, | Performed by: RADIOLOGY

## 2023-09-28 PROCEDURE — 77063 MAMMO DIGITAL SCREENING BILAT WITH TOMO: ICD-10-PCS | Mod: 26,,, | Performed by: RADIOLOGY

## 2023-09-28 PROCEDURE — 77067 SCR MAMMO BI INCL CAD: CPT | Mod: TC

## 2024-01-04 RX ORDER — GABAPENTIN 100 MG/1
CAPSULE ORAL
Qty: 120 CAPSULE | Refills: 5 | Status: SHIPPED | OUTPATIENT
Start: 2024-01-04

## 2024-01-09 ENCOUNTER — OFFICE VISIT (OUTPATIENT)
Dept: INTERNAL MEDICINE | Facility: CLINIC | Age: 74
End: 2024-01-09
Payer: MEDICARE

## 2024-01-09 VITALS
DIASTOLIC BLOOD PRESSURE: 72 MMHG | HEIGHT: 63 IN | HEART RATE: 49 BPM | SYSTOLIC BLOOD PRESSURE: 114 MMHG | BODY MASS INDEX: 29.06 KG/M2 | OXYGEN SATURATION: 98 % | WEIGHT: 164 LBS

## 2024-01-09 DIAGNOSIS — Z00.00 ENCOUNTER FOR ANNUAL PHYSICAL EXAM: Primary | ICD-10-CM

## 2024-01-09 DIAGNOSIS — E53.8 VITAMIN B12 DEFICIENCY: ICD-10-CM

## 2024-01-09 DIAGNOSIS — Z86.79 HISTORY OF SUBDURAL HEMATOMA: ICD-10-CM

## 2024-01-09 DIAGNOSIS — E78.2 MIXED HYPERLIPIDEMIA: ICD-10-CM

## 2024-01-09 DIAGNOSIS — I25.10 ATHEROSCLEROSIS OF CORONARY ARTERY, UNSPECIFIED VESSEL OR LESION TYPE, UNSPECIFIED WHETHER ANGINA PRESENT, UNSPECIFIED WHETHER NATIVE OR TRANSPLANTED HEART: ICD-10-CM

## 2024-01-09 DIAGNOSIS — R73.03 PREDIABETES: ICD-10-CM

## 2024-01-09 DIAGNOSIS — I10 ESSENTIAL HYPERTENSION: ICD-10-CM

## 2024-01-09 DIAGNOSIS — D50.9 IRON DEFICIENCY ANEMIA, UNSPECIFIED IRON DEFICIENCY ANEMIA TYPE: ICD-10-CM

## 2024-01-09 DIAGNOSIS — Z86.711 HISTORY OF PULMONARY EMBOLISM: ICD-10-CM

## 2024-01-09 DIAGNOSIS — Z86.718 HISTORY OF DVT (DEEP VEIN THROMBOSIS): ICD-10-CM

## 2024-01-09 DIAGNOSIS — E55.9 VITAMIN D DEFICIENCY: ICD-10-CM

## 2024-01-09 DIAGNOSIS — G56.01 CARPAL TUNNEL SYNDROME OF RIGHT WRIST: ICD-10-CM

## 2024-01-09 PROCEDURE — 3288F FALL RISK ASSESSMENT DOCD: CPT | Mod: CPTII,S$GLB,, | Performed by: INTERNAL MEDICINE

## 2024-01-09 PROCEDURE — 3078F DIAST BP <80 MM HG: CPT | Mod: CPTII,S$GLB,, | Performed by: INTERNAL MEDICINE

## 2024-01-09 PROCEDURE — 3008F BODY MASS INDEX DOCD: CPT | Mod: CPTII,S$GLB,, | Performed by: INTERNAL MEDICINE

## 2024-01-09 PROCEDURE — 1160F RVW MEDS BY RX/DR IN RCRD: CPT | Mod: CPTII,S$GLB,, | Performed by: INTERNAL MEDICINE

## 2024-01-09 PROCEDURE — 1159F MED LIST DOCD IN RCRD: CPT | Mod: CPTII,S$GLB,, | Performed by: INTERNAL MEDICINE

## 2024-01-09 PROCEDURE — 1101F PT FALLS ASSESS-DOCD LE1/YR: CPT | Mod: CPTII,S$GLB,, | Performed by: INTERNAL MEDICINE

## 2024-01-09 PROCEDURE — 99999 PR PBB SHADOW E&M-EST. PATIENT-LVL IV: CPT | Mod: PBBFAC,,, | Performed by: INTERNAL MEDICINE

## 2024-01-09 PROCEDURE — 3074F SYST BP LT 130 MM HG: CPT | Mod: CPTII,S$GLB,, | Performed by: INTERNAL MEDICINE

## 2024-01-09 PROCEDURE — 99397 PER PM REEVAL EST PAT 65+ YR: CPT | Mod: S$GLB,,, | Performed by: INTERNAL MEDICINE

## 2024-01-09 PROCEDURE — 1126F AMNT PAIN NOTED NONE PRSNT: CPT | Mod: CPTII,S$GLB,, | Performed by: INTERNAL MEDICINE

## 2024-01-09 RX ORDER — ROSUVASTATIN CALCIUM 20 MG/1
20 TABLET, COATED ORAL NIGHTLY
Qty: 90 TABLET | Refills: 3 | Status: SHIPPED | OUTPATIENT
Start: 2024-01-09

## 2024-01-09 NOTE — PROGRESS NOTES
Subjective:       Patient ID: Shirin Evans is a 73 y.o. female.    Chief Complaint: Follow-up      HPI  Shirin Evans is a 73 y.o. year old female with subdural hematoma s/p craniotomy, residual chronic headaches intermittent nonintractable (improving), CTS s/p release of R hand, HTN (lifestyle controlled), HLD, prediabtes (lifestyle controlled) presents for annual exam.     Feels like she is doing well. Is vigilant about her symptoms and she reports nothing of concern to her at this time.     Review of Systems   Constitutional:  Negative for activity change, appetite change and fatigue (feels fatigue has improved).   Eyes:  Negative for visual disturbance.   Respiratory:  Negative for shortness of breath.    Cardiovascular:  Negative for chest pain.   Gastrointestinal:  Negative for abdominal pain, diarrhea, nausea and vomiting.   Musculoskeletal:  Negative for arthralgias, back pain and myalgias.   Neurological:  Positive for speech difficulty (improved) and headaches (intermittent, nothing concerning to patient). Negative for dizziness, weakness and light-headedness.   Psychiatric/Behavioral:  Negative for decreased concentration and dysphoric mood. The patient is not nervous/anxious.          Past Medical History:   Diagnosis Date    Atherosclerosis of coronary artery 3/25/2020    Carpal tunnel syndrome, bilateral 2/3/2022    Headache     Hypertension     Movement disorder     Pulmonary embolism     Syncope and collapse     Weakness of right lower extremity 6/3/2021        Prior to Admission medications    Medication Sig Start Date End Date Taking? Authorizing Provider   acetaminophen (TYLENOL) 325 MG tablet Take 2 tablets (650 mg total) by mouth every 4 (four) hours as needed. 6/11/21   Jojo Sharma PA-C   calcium carbonate (OS-SANDER) 500 mg calcium (1,250 mg) chewable tablet  9/19/21   Provider, Historical   cyanocobalamin (VITAMIN B-12) 1000 MCG tablet Take 1 tablet (1,000 mcg total) by mouth  "once daily. 6/30/21   Chris Villanueva MD   doxycycline (ORACEA) 40 mg capsule Take 40 mg by mouth daily as needed.    Provider, Historical   ergocalciferol, vitamin D2, (VITAMIN D ORAL)  9/19/21   Provider, Historical   ferrous sulfate (FEOSOL) 325 mg (65 mg iron) Tab tablet Take 1 tablet (325 mg total) by mouth every other day. 7/6/23   David Siegel MD   gabapentin (NEURONTIN) 100 MG capsule TAKE ONE CAPSULE BY MOUTH EVERY MORNING AND 3 CAPSULES BY MOUTH EVERY EVENING AS DIRECTED 1/4/24   Drake Pride MD   pneumoc 20-jazzy conj-dip cr,PF, (PREVNAR-20, PF,) 0.5 mL Syrg injection Inject 0.5 mLs into the muscle.  Patient not taking: Reported on 7/6/2023 1/26/23   Alisha Thomas, PharmD   rosuvastatin (CRESTOR) 20 MG tablet Take 1 tablet (20 mg total) by mouth every evening. 1/26/23   David Siegel MD   clopidogreL (PLAVIX) 75 mg tablet Take 1 tablet (75 mg total) by mouth once daily. for 21 days 6/3/21 6/3/21  Savanah Casarez MD        Past medical history, surgical history, and family medical history reviewed and updated as appropriate.    Medications and allergies reviewed.     Objective:          Vitals:    01/09/24 1123   BP: 114/72   BP Location: Right arm   Patient Position: Sitting   Pulse: (!) 49   SpO2: 98%   Weight: 74.4 kg (164 lb 0.4 oz)   Height: 5' 3" (1.6 m)     Body mass index is 29.06 kg/m².  Physical Exam  Constitutional:       Appearance: She is well-developed.   HENT:      Head: Normocephalic and atraumatic.   Eyes:      Extraocular Movements: Extraocular movements intact.   Cardiovascular:      Rate and Rhythm: Normal rate and regular rhythm.      Heart sounds: Normal heart sounds.   Pulmonary:      Effort: Pulmonary effort is normal. No respiratory distress.      Breath sounds: Normal breath sounds. No wheezing.   Abdominal:      General: Bowel sounds are normal. There is no distension.      Palpations: Abdomen is soft.      Tenderness: There is no abdominal tenderness.   Musculoskeletal:   "       General: No tenderness. Normal range of motion.      Cervical back: Normal range of motion.   Skin:     General: Skin is warm and dry.   Neurological:      Mental Status: She is alert and oriented to person, place, and time.      Cranial Nerves: No cranial nerve deficit.      Deep Tendon Reflexes: Reflexes are normal and symmetric.         Lab Results   Component Value Date    WBC 4.56 07/06/2023    HGB 13.6 07/06/2023    HCT 41.1 07/06/2023     07/06/2023    CHOL 137 07/06/2023    TRIG 68 07/06/2023    HDL 61 07/06/2023    ALT 11 07/06/2023    AST 21 07/06/2023     07/06/2023    K 4.0 07/06/2023     07/06/2023    CREATININE 0.7 07/06/2023    BUN 9 07/06/2023    CO2 25 07/06/2023    TSH 2.253 07/06/2023    INR 1.0 06/03/2021    HGBA1C 5.5 07/06/2023       Assessment:       1. Encounter for annual physical exam    2. Essential hypertension    3. History of subdural hematoma s/p craniotomy    4. Atherosclerosis of coronary artery, unspecified vessel or lesion type, unspecified whether angina present, unspecified whether native or transplanted heart    5. History of pulmonary embolism    6. History of DVT (deep vein thrombosis)    7. Iron deficiency anemia, unspecified iron deficiency anemia type    8. Vitamin B12 deficiency    9. Vitamin D deficiency    10. Carpal tunnel syndrome of right wrist    11. Prediabetes    12. Mixed hyperlipidemia          Plan:     Shirin was seen today for follow-up.    Diagnoses and all orders for this visit:    Encounter for annual physical exam    Essential hypertension  -     CBC Auto Differential; Future  -     Comprehensive Metabolic Panel; Future  -     TSH; Future    History of subdural hematoma s/p craniotomy  Comments:  headache symptoms well controlled on gabapentin 100 mg qam, 300 mg qhs. followed prev by vascular neuro (Dr. Pride). has not followed up since 11/2022  Orders:  -     Ambulatory referral/consult to Neurology; Future    Atherosclerosis of  coronary artery, unspecified vessel or lesion type, unspecified whether angina present, unspecified whether native or transplanted heart    History of pulmonary embolism    History of DVT (deep vein thrombosis)    Iron deficiency anemia, unspecified iron deficiency anemia type    Vitamin B12 deficiency    Vitamin D deficiency    Carpal tunnel syndrome of right wrist    Prediabetes  Comments:  last a1c 5.5, recheck  Orders:  -     Hemoglobin A1C; Future    Mixed hyperlipidemia  Comments:  on crestor 20, tolerating, no changes ot current management  Orders:  -     rosuvastatin (CRESTOR) 20 MG tablet; Take 1 tablet (20 mg total) by mouth every evening.  -     Lipid Panel; Future    Benign physical examination, no issues identified. Will obtain routine labwork and age appropriate health screenings.     Health maintenance reviewed with patient.     Follow up in about 6 months (around 7/9/2024).    David Siegel MD  Internal Medicine / Primary Care  [facility]  1/9/2024

## 2024-01-09 NOTE — PATIENT INSTRUCTIONS
Schedule neurology appointment with Dr. Pride to follow up on your chronic headaches and history of craniotomy.   Schedule ob/gyn appointment for well woman exam, referral in place.    Fasting labwork in 6 months    Return to clinic in 6 months for follow up or sooner if needed.     Vaccinations recommended:  Shingles (after 1/28/2024)  Covid-19 booster  RSV vaccination

## 2024-01-23 ENCOUNTER — TELEPHONE (OUTPATIENT)
Dept: NEUROLOGY | Facility: CLINIC | Age: 74
End: 2024-01-23
Payer: MEDICARE

## 2024-01-23 NOTE — TELEPHONE ENCOUNTER
----- Message from Goldie STUBBS Route sent at 1/23/2024 11:00 AM CST -----  Regarding: Later Appointment  Contact: Elva   Pt's daughter Elva is calling in ref to 2/14 scheduled appt. Pt is not available at that time. Elva is asking for an appt in April or May preferably April 16, or May 28th when elva can be with the pt. Patient Requesting Call Back @ Elva

## 2024-02-01 ENCOUNTER — TELEPHONE (OUTPATIENT)
Dept: NEUROLOGY | Facility: CLINIC | Age: 74
End: 2024-02-01
Payer: MEDICARE

## 2024-02-01 NOTE — TELEPHONE ENCOUNTER
----- Message from Maye Swan sent at 2/1/2024  3:38 PM CST -----  Regarding: Appt Reschedule  Contact: [phone]  Pt's daughter Lachelle is calling to have pt's appt scheduled for 2/14/24 w provider rescheduled to April 16 or May 28 so that she can be there to attend appt w pt. Please give pt's daughter a call back to reschedule.

## 2024-02-06 DIAGNOSIS — Z12.11 COLON CANCER SCREENING: ICD-10-CM

## 2024-03-25 ENCOUNTER — LAB VISIT (OUTPATIENT)
Dept: LAB | Facility: HOSPITAL | Age: 74
End: 2024-03-25
Attending: INTERNAL MEDICINE
Payer: MEDICARE

## 2024-03-25 DIAGNOSIS — Z12.11 COLON CANCER SCREENING: ICD-10-CM

## 2024-03-25 PROCEDURE — 82274 ASSAY TEST FOR BLOOD FECAL: CPT | Performed by: INTERNAL MEDICINE

## 2024-03-29 DIAGNOSIS — E78.2 MIXED HYPERLIPIDEMIA: ICD-10-CM

## 2024-03-29 NOTE — TELEPHONE ENCOUNTER
No care due was identified.  [facility] Embedded Care Due Messages. Reference number: [id number].   3/29/2024 9:45:08 AM CDT

## 2024-03-30 RX ORDER — ROSUVASTATIN CALCIUM 20 MG/1
20 TABLET, COATED ORAL NIGHTLY
Qty: 90 TABLET | Refills: 1 | Status: SHIPPED | OUTPATIENT
Start: 2024-03-30

## 2024-03-31 NOTE — TELEPHONE ENCOUNTER
Refill Decision Note   Shirin Evans  is requesting a refill authorization.  Brief Assessment and Rationale for Refill:  Approve     Medication Therapy Plan:        Comments:     Note composed:11:53 PM 03/30/2024

## 2024-04-03 LAB — HEMOCCULT STL QL IA: NEGATIVE

## 2024-04-18 ENCOUNTER — OFFICE VISIT (OUTPATIENT)
Dept: NEUROLOGY | Facility: CLINIC | Age: 74
End: 2024-04-18
Payer: MEDICARE

## 2024-04-18 VITALS
DIASTOLIC BLOOD PRESSURE: 89 MMHG | BODY MASS INDEX: 29.06 KG/M2 | HEART RATE: 54 BPM | SYSTOLIC BLOOD PRESSURE: 146 MMHG | HEIGHT: 63 IN | WEIGHT: 164 LBS

## 2024-04-18 DIAGNOSIS — Z86.79 HISTORY OF SUBDURAL HEMATOMA: ICD-10-CM

## 2024-04-18 DIAGNOSIS — R51.9 NONINTRACTABLE EPISODIC HEADACHE, UNSPECIFIED HEADACHE TYPE: Primary | ICD-10-CM

## 2024-04-18 PROCEDURE — 3079F DIAST BP 80-89 MM HG: CPT | Mod: CPTII,S$GLB,, | Performed by: PSYCHIATRY & NEUROLOGY

## 2024-04-18 PROCEDURE — 3008F BODY MASS INDEX DOCD: CPT | Mod: CPTII,S$GLB,, | Performed by: PSYCHIATRY & NEUROLOGY

## 2024-04-18 PROCEDURE — 3077F SYST BP >= 140 MM HG: CPT | Mod: CPTII,S$GLB,, | Performed by: PSYCHIATRY & NEUROLOGY

## 2024-04-18 PROCEDURE — 3288F FALL RISK ASSESSMENT DOCD: CPT | Mod: CPTII,S$GLB,, | Performed by: PSYCHIATRY & NEUROLOGY

## 2024-04-18 PROCEDURE — 1159F MED LIST DOCD IN RCRD: CPT | Mod: CPTII,S$GLB,, | Performed by: PSYCHIATRY & NEUROLOGY

## 2024-04-18 PROCEDURE — 1126F AMNT PAIN NOTED NONE PRSNT: CPT | Mod: CPTII,S$GLB,, | Performed by: PSYCHIATRY & NEUROLOGY

## 2024-04-18 PROCEDURE — 99213 OFFICE O/P EST LOW 20 MIN: CPT | Mod: S$GLB,,, | Performed by: PSYCHIATRY & NEUROLOGY

## 2024-04-18 PROCEDURE — 99999 PR PBB SHADOW E&M-EST. PATIENT-LVL III: CPT | Mod: PBBFAC,,, | Performed by: PSYCHIATRY & NEUROLOGY

## 2024-04-18 PROCEDURE — 1101F PT FALLS ASSESS-DOCD LE1/YR: CPT | Mod: CPTII,S$GLB,, | Performed by: PSYCHIATRY & NEUROLOGY

## 2024-04-18 NOTE — PROGRESS NOTES
Shirin Evans is a 73 y.o. year old female that  presents for HA follow up.  She was last seen on 11/17/22.    HPI:  Mrs Evans has HTN, bilateral CTS, DVT, PE, and bilateral craniotomies for evacuation of subdural hematoma on 6/4/21 followed by left MMA embolization on 6/6/21.   She comes in today stating that gabapentin has helped a lot and HA is not significant problem anymore.  Reports minor cognitive changes that are not affecting her ability to function well independently.  No spells concerning for seizures, vertigo, double vision, focal weakness or numbness, slurred speech, language or visual impairment.      Past Medical History:   Diagnosis Date    Atherosclerosis of coronary artery 3/25/2020    Carpal tunnel syndrome, bilateral 2/3/2022    Headache     Hypertension     Movement disorder     Pulmonary embolism     Syncope and collapse     Weakness of right lower extremity 6/3/2021     Social History     Socioeconomic History    Marital status: Single   Occupational History    Occupation: Retired    Tobacco Use    Smoking status: Never    Smokeless tobacco: Never   Substance and Sexual Activity    Alcohol use: Not Currently    Drug use: Not Currently    Sexual activity: Not Currently     Past Surgical History:   Procedure Laterality Date    BREAST BIOPSY Left     years ago, benign per pt    CARPAL TUNNEL RELEASE Right 05/23/2022    Procedure: RELEASE, CARPAL TUNNEL,RIGHT;  Surgeon: Angelica Aguero MD;  Location: [facility] OR;  Service: Orthopedics;  Laterality: Right;    CEREBRAL ANGIOGRAM N/A 06/06/2021    Procedure: ANGIOGRAM-CEREBRAL; bilateral MMA embolization;  Surgeon: Ramon Che MD;  Location: [facility] OR [address] [place] [address];  Service: Neurosurgery;  Laterality: N/A;    CRANIOTOMY FOR EVACUATION OF SUBDURAL HEMATOMA Bilateral 06/04/2021    Procedure: CRANIOTOMY, FOR SUBDURAL HEMATOMA EVACUATION;  Surgeon: Maribell Michaud MD;  Location: [facility] OR [address] [place] [address];  Service: Neurosurgery;   "Laterality: Bilateral;     Family History   Problem Relation Name Age of Onset    Heart attacks under age 50 Mother  48    Depression Mother      Atrial fibrillation Father      Hypertension Father      Cancer Father      Heart disease Father      Deep vein thrombosis Sister          Unprovoked    Stomach cancer Maternal Grandmother      Aneurysm Neg Hx      Dementia Neg Hx      Diabetes Neg Hx      Fainting Neg Hx      Kidney disease Neg Hx      Liver disease Neg Hx      Migraines Neg Hx      Neuropathy Neg Hx      Parkinsonism Neg Hx      Seizures Neg Hx      Stroke Neg Hx      Tremor Neg Hx         Review of Systems  General ROS: negative for chills, fever or weight loss  Psychological ROS: negative for hallucination, depression or suicidal ideation  Ophthalmic ROS: negative for blurry vision, photophobia or eye pain  ENT ROS: negative for epistaxis, sore throat or rhinorrhea  Respiratory ROS: no cough, shortness of breath, or wheezing  Cardiovascular ROS: no chest pain or dyspnea on exertion  Gastrointestinal ROS: no abdominal pain, change in bowel habits, or black/ bloody stools  Genito-Urinary ROS: no dysuria, trouble voiding, or hematuria  Musculoskeletal ROS: negative for gait disturbance or muscular weakness  Neurological ROS: no syncope or seizures; no ataxia  Dermatological ROS: negative for pruritis, rash and jaundice      Physical Exam:  BP (!) 146/89   Pulse (!) 54   Ht 5' 3" (1.6 m)   Wt 74.4 kg (164 lb 0.4 oz)   LMP  (LMP Unknown)   BMI 29.06 kg/m²   General appearance: alert, cooperative, no distress  Constitutional:Oriented to person, place, and time.appears well-developed and well-nourished.   HEENT: Normocephalic, atraumatic, neck symmetrical, no nasal discharge   Eyes: conjunctivae/corneas clear, PERRL, EOM's intact  Lungs: clear to auscultation bilaterally, no dullness to percussion bilaterally  Heart: regular rate and rhythm without rub; no displacement of the PMI   Abdomen: soft, " non-tender; bowel sounds normoactive; no organomegaly  Extremities: extremities symmetric; no clubbing, cyanosis, or edema  Integument: Skin color, texture, turgor normal; no rashes; hair distrubution normal  Neurologic:  Mental status: alert and awake, oriented x 4, comprehension, naming, and repetition intact. No right to left confusion. Performs serial 7's without difficulty .No dysarthria.  CN 2-12: pupils 4 mm bilaterally, reactive to light. Fundi without papilledema. Visual fields full to confrontation. EOM full without nystagmus. Face sensation normal in all distributions. Face symmetric. Hearing grossly intact. Palate elevates well. Tongue midline without atrophy or fasciculations.  Motor: 5/5 all over  Sensory: intact in all modalities.  DTR's: 2+ all over.  Plantars: no tested.  Coordination: finger to nose and heel-knee-shin intact.  Gait: no ataxia or bradykinesia     LABS:    Complete Blood Count  Lab Results   Component Value Date    RBC 4.50 07/06/2023    HGB 13.6 07/06/2023    HCT 41.1 07/06/2023    MCV 91 07/06/2023    MCH 30.2 07/06/2023    MCHC 33.1 07/06/2023    RDW 13.2 07/06/2023     07/06/2023    MPV 10.1 07/06/2023    GRAN 2.3 07/06/2023    GRAN 50.5 07/06/2023    LYMPH 1.8 07/06/2023    LYMPH 39.7 07/06/2023    MONO 0.3 07/06/2023    MONO 7.2 07/06/2023    EOS 0.1 07/06/2023    BASO 0.05 07/06/2023    EOSINOPHIL 1.3 07/06/2023    BASOPHIL 1.1 07/06/2023    DIFFMETHOD Automated 07/06/2023       Comprehensive Metabolic Panel  Lab Results   Component Value Date    GLU 87 07/06/2023    BUN 9 07/06/2023    CREATININE 0.7 07/06/2023     07/06/2023    K 4.0 07/06/2023     07/06/2023    PROT 7.5 07/06/2023    ALBUMIN 4.4 07/06/2023    BILITOT 0.6 07/06/2023    AST 21 07/06/2023    ALKPHOS 77 07/06/2023    CO2 25 07/06/2023    ALT 11 07/06/2023    ANIONGAP 13 07/06/2023    EGFRNONAA >60.0 06/12/2021    ESTGFRAFRICA >60.0 06/12/2021       TSH  Lab Results   Component Value Date     TSH 2.253 07/06/2023         Assessment: 72 y/o with HTN, bilateral CTS, DVT, PE, s/p bilateral craniotomies for evacuation of subdural hematoma on 6/4/21 followed by left MMA embolization on 6/6/21, here for follow up of HA.  Doing great from a HA standpoint and no new active neurological issues at this moment.  She wishes her PCP to continue gabapentin management and I have no objection in that regard.    No diagnosis found.  There were no encounter diagnoses.    Plan:  1) HA, mixed type: as above  2) HTN  3) DVT/PE (resolved)  4) s/p bilateral craniotomies for evacuation of subdural hematoma on 6/4/21  5) S/p left MMA embolization on 6/6/21  6) Bilateral CTS        No orders of the defined types were placed in this encounter.          Drake Pride MD

## 2024-05-23 ENCOUNTER — TELEPHONE (OUTPATIENT)
Dept: INTERNAL MEDICINE | Facility: CLINIC | Age: 74
End: 2024-05-23
Payer: MEDICARE

## 2024-05-23 NOTE — TELEPHONE ENCOUNTER
----- Message from Comfort Roberto sent at 5/21/2024  3:54 PM CDT -----  Contact: [phone]  Yolie with [facility] is calling they faxed over order for DME supplies they are waiting for the signed documents from the office please advise

## 2024-06-10 ENCOUNTER — PATIENT MESSAGE (OUTPATIENT)
Dept: INTERNAL MEDICINE | Facility: CLINIC | Age: 74
End: 2024-06-10
Payer: MEDICARE

## 2024-06-25 ENCOUNTER — LAB VISIT (OUTPATIENT)
Dept: LAB | Facility: HOSPITAL | Age: 74
End: 2024-06-25
Attending: INTERNAL MEDICINE
Payer: MEDICARE

## 2024-06-25 DIAGNOSIS — R73.03 PREDIABETES: ICD-10-CM

## 2024-06-25 DIAGNOSIS — E78.2 MIXED HYPERLIPIDEMIA: ICD-10-CM

## 2024-06-25 DIAGNOSIS — I10 ESSENTIAL HYPERTENSION: ICD-10-CM

## 2024-06-25 LAB
ALBUMIN SERPL BCP-MCNC: 4.2 G/DL (ref 3.5–5.2)
ALP SERPL-CCNC: 54 U/L (ref 55–135)
ALT SERPL W/O P-5'-P-CCNC: 11 U/L (ref 10–44)
ANION GAP SERPL CALC-SCNC: 9 MMOL/L (ref 8–16)
AST SERPL-CCNC: 18 U/L (ref 10–40)
BASOPHILS # BLD AUTO: 0.03 K/UL (ref 0–0.2)
BASOPHILS NFR BLD: 0.6 % (ref 0–1.9)
BILIRUB SERPL-MCNC: 0.6 MG/DL (ref 0.1–1)
BUN SERPL-MCNC: 11 MG/DL (ref 8–23)
CALCIUM SERPL-MCNC: 10.1 MG/DL (ref 8.7–10.5)
CHLORIDE SERPL-SCNC: 108 MMOL/L (ref 95–110)
CHOLEST SERPL-MCNC: 134 MG/DL (ref 120–199)
CHOLEST/HDLC SERPL: 2.2 {RATIO} (ref 2–5)
CO2 SERPL-SCNC: 26 MMOL/L (ref 23–29)
CREAT SERPL-MCNC: 0.7 MG/DL (ref 0.5–1.4)
DIFFERENTIAL METHOD BLD: ABNORMAL
EOSINOPHIL # BLD AUTO: 0.1 K/UL (ref 0–0.5)
EOSINOPHIL NFR BLD: 1.5 % (ref 0–8)
ERYTHROCYTE [DISTWIDTH] IN BLOOD BY AUTOMATED COUNT: 13.1 % (ref 11.5–14.5)
EST. GFR  (NO RACE VARIABLE): >60 ML/MIN/1.73 M^2
ESTIMATED AVG GLUCOSE: 111 MG/DL (ref 68–131)
GLUCOSE SERPL-MCNC: 99 MG/DL (ref 70–110)
HBA1C MFR BLD: 5.5 % (ref 4–5.6)
HCT VFR BLD AUTO: 39.9 % (ref 37–48.5)
HDLC SERPL-MCNC: 60 MG/DL (ref 40–75)
HDLC SERPL: 44.8 % (ref 20–50)
HGB BLD-MCNC: 13.5 G/DL (ref 12–16)
IMM GRANULOCYTES # BLD AUTO: 0.01 K/UL (ref 0–0.04)
IMM GRANULOCYTES NFR BLD AUTO: 0.2 % (ref 0–0.5)
LDLC SERPL CALC-MCNC: 61 MG/DL (ref 63–159)
LYMPHOCYTES # BLD AUTO: 1.6 K/UL (ref 1–4.8)
LYMPHOCYTES NFR BLD: 32.9 % (ref 18–48)
MCH RBC QN AUTO: 31.8 PG (ref 27–31)
MCHC RBC AUTO-ENTMCNC: 33.8 G/DL (ref 32–36)
MCV RBC AUTO: 94 FL (ref 82–98)
MONOCYTES # BLD AUTO: 0.4 K/UL (ref 0.3–1)
MONOCYTES NFR BLD: 7.5 % (ref 4–15)
NEUTROPHILS # BLD AUTO: 2.7 K/UL (ref 1.8–7.7)
NEUTROPHILS NFR BLD: 57.3 % (ref 38–73)
NONHDLC SERPL-MCNC: 74 MG/DL
NRBC BLD-RTO: 0 /100 WBC
PLATELET # BLD AUTO: 188 K/UL (ref 150–450)
PMV BLD AUTO: 10.1 FL (ref 9.2–12.9)
POTASSIUM SERPL-SCNC: 4 MMOL/L (ref 3.5–5.1)
PROT SERPL-MCNC: 7.1 G/DL (ref 6–8.4)
RBC # BLD AUTO: 4.24 M/UL (ref 4–5.4)
SODIUM SERPL-SCNC: 143 MMOL/L (ref 136–145)
TRIGL SERPL-MCNC: 65 MG/DL (ref 30–150)
TSH SERPL DL<=0.005 MIU/L-ACNC: 2.54 UIU/ML (ref 0.4–4)
WBC # BLD AUTO: 4.77 K/UL (ref 3.9–12.7)

## 2024-06-25 PROCEDURE — 80061 LIPID PANEL: CPT | Performed by: INTERNAL MEDICINE

## 2024-06-25 PROCEDURE — 36415 COLL VENOUS BLD VENIPUNCTURE: CPT | Mod: PN | Performed by: INTERNAL MEDICINE

## 2024-06-25 PROCEDURE — 80053 COMPREHEN METABOLIC PANEL: CPT | Performed by: INTERNAL MEDICINE

## 2024-06-25 PROCEDURE — 83036 HEMOGLOBIN GLYCOSYLATED A1C: CPT | Performed by: INTERNAL MEDICINE

## 2024-06-25 PROCEDURE — 85025 COMPLETE CBC W/AUTO DIFF WBC: CPT | Performed by: INTERNAL MEDICINE

## 2024-06-25 PROCEDURE — 84443 ASSAY THYROID STIM HORMONE: CPT | Performed by: INTERNAL MEDICINE

## 2024-07-09 ENCOUNTER — OFFICE VISIT (OUTPATIENT)
Dept: INTERNAL MEDICINE | Facility: CLINIC | Age: 74
End: 2024-07-09
Payer: MEDICARE

## 2024-07-09 VITALS
HEART RATE: 75 BPM | OXYGEN SATURATION: 100 % | DIASTOLIC BLOOD PRESSURE: 76 MMHG | SYSTOLIC BLOOD PRESSURE: 126 MMHG | WEIGHT: 169.31 LBS | HEIGHT: 63 IN | BODY MASS INDEX: 30 KG/M2

## 2024-07-09 DIAGNOSIS — I25.10 ATHEROSCLEROSIS OF CORONARY ARTERY, UNSPECIFIED VESSEL OR LESION TYPE, UNSPECIFIED WHETHER ANGINA PRESENT, UNSPECIFIED WHETHER NATIVE OR TRANSPLANTED HEART: ICD-10-CM

## 2024-07-09 DIAGNOSIS — I10 ESSENTIAL HYPERTENSION: ICD-10-CM

## 2024-07-09 DIAGNOSIS — E55.9 VITAMIN D DEFICIENCY: ICD-10-CM

## 2024-07-09 DIAGNOSIS — Z12.31 BREAST CANCER SCREENING BY MAMMOGRAM: ICD-10-CM

## 2024-07-09 DIAGNOSIS — D50.9 IRON DEFICIENCY ANEMIA, UNSPECIFIED IRON DEFICIENCY ANEMIA TYPE: ICD-10-CM

## 2024-07-09 DIAGNOSIS — R73.03 PREDIABETES: ICD-10-CM

## 2024-07-09 DIAGNOSIS — Z78.0 ASYMPTOMATIC POSTMENOPAUSAL STATE: ICD-10-CM

## 2024-07-09 DIAGNOSIS — E53.8 VITAMIN B12 DEFICIENCY: ICD-10-CM

## 2024-07-09 DIAGNOSIS — Z86.711 HISTORY OF PULMONARY EMBOLISM: ICD-10-CM

## 2024-07-09 DIAGNOSIS — Z86.79 HISTORY OF SUBDURAL HEMATOMA: ICD-10-CM

## 2024-07-09 DIAGNOSIS — R51.9 NONINTRACTABLE HEADACHE, UNSPECIFIED CHRONICITY PATTERN, UNSPECIFIED HEADACHE TYPE: ICD-10-CM

## 2024-07-09 DIAGNOSIS — M85.80 OSTEOPENIA, UNSPECIFIED LOCATION: ICD-10-CM

## 2024-07-09 DIAGNOSIS — Z86.718 HISTORY OF DVT (DEEP VEIN THROMBOSIS): ICD-10-CM

## 2024-07-09 DIAGNOSIS — Z13.820 OSTEOPOROSIS SCREENING: ICD-10-CM

## 2024-07-09 DIAGNOSIS — Z09 FOLLOW-UP EXAM: Primary | ICD-10-CM

## 2024-07-09 DIAGNOSIS — E78.2 MIXED HYPERLIPIDEMIA: ICD-10-CM

## 2024-07-09 PROCEDURE — 3008F BODY MASS INDEX DOCD: CPT | Mod: CPTII,S$GLB,, | Performed by: INTERNAL MEDICINE

## 2024-07-09 PROCEDURE — 1101F PT FALLS ASSESS-DOCD LE1/YR: CPT | Mod: CPTII,S$GLB,, | Performed by: INTERNAL MEDICINE

## 2024-07-09 PROCEDURE — 3288F FALL RISK ASSESSMENT DOCD: CPT | Mod: CPTII,S$GLB,, | Performed by: INTERNAL MEDICINE

## 2024-07-09 PROCEDURE — 1159F MED LIST DOCD IN RCRD: CPT | Mod: CPTII,S$GLB,, | Performed by: INTERNAL MEDICINE

## 2024-07-09 PROCEDURE — 1126F AMNT PAIN NOTED NONE PRSNT: CPT | Mod: CPTII,S$GLB,, | Performed by: INTERNAL MEDICINE

## 2024-07-09 PROCEDURE — 3074F SYST BP LT 130 MM HG: CPT | Mod: CPTII,S$GLB,, | Performed by: INTERNAL MEDICINE

## 2024-07-09 PROCEDURE — 99214 OFFICE O/P EST MOD 30 MIN: CPT | Mod: S$GLB,,, | Performed by: INTERNAL MEDICINE

## 2024-07-09 PROCEDURE — 3044F HG A1C LEVEL LT 7.0%: CPT | Mod: CPTII,S$GLB,, | Performed by: INTERNAL MEDICINE

## 2024-07-09 PROCEDURE — 99999 PR PBB SHADOW E&M-EST. PATIENT-LVL III: CPT | Mod: PBBFAC,,, | Performed by: INTERNAL MEDICINE

## 2024-07-09 PROCEDURE — 3078F DIAST BP <80 MM HG: CPT | Mod: CPTII,S$GLB,, | Performed by: INTERNAL MEDICINE

## 2024-07-09 RX ORDER — GABAPENTIN 100 MG/1
CAPSULE ORAL
Qty: 120 CAPSULE | Refills: 5 | OUTPATIENT
Start: 2024-07-09

## 2024-07-09 RX ORDER — GABAPENTIN 100 MG/1
CAPSULE ORAL
Qty: 120 CAPSULE | Refills: 11 | Status: SHIPPED | OUTPATIENT
Start: 2024-07-09

## 2024-07-09 NOTE — PATIENT INSTRUCTIONS
Schedule mammogram for after 9/28/2024  Schedule DEXA scan on same day as mammogram    Continue medications as prescribed    Return to clinic in 6 months for annual exam or sooner if needed.

## 2024-07-09 NOTE — PROGRESS NOTES
Subjective:       Patient ID: Shirin Evans is a 73 y.o. female.    Chief Complaint: Follow-up      HPI  Shirin Evans is a 73 y.o. year old female with subdural hematoma s/p craniotomy, residual chronic headaches (intermittent, nonintractable), CTS s/p release of R hand, HTN (lifestyle controlled), HLD, prediabtes (lifestyle controlled) presents for follow up.     Review of Systems   Constitutional:  Negative for activity change, appetite change, fatigue, fever and unexpected weight change.        Feels fatigue has improved   HENT:  Negative for congestion, hearing loss, postnasal drip, sneezing, sore throat, trouble swallowing and voice change.    Eyes:  Negative for pain and discharge.   Respiratory:  Negative for cough, choking, chest tightness, shortness of breath and wheezing.    Cardiovascular:  Negative for chest pain, palpitations and leg swelling.   Gastrointestinal:  Negative for abdominal distention, abdominal pain, blood in stool, constipation, diarrhea, nausea and vomiting.   Endocrine: Negative for polydipsia and polyuria.   Genitourinary:  Negative for difficulty urinating and flank pain.   Musculoskeletal:  Negative for arthralgias, back pain, joint swelling, myalgias and neck pain.   Skin:  Negative for rash.   Neurological:  Negative for dizziness, tremors, seizures, weakness, numbness and headaches.        Speech difficulty (improved) and headaches (intermittent, nothing concerning to patient).   Psychiatric/Behavioral:  Negative for agitation. The patient is not nervous/anxious.          Past Medical History:   Diagnosis Date    Atherosclerosis of coronary artery 3/25/2020    Carpal tunnel syndrome, bilateral 2/3/2022    Headache     Hypertension     Movement disorder     Pulmonary embolism     Syncope and collapse     Weakness of right lower extremity 6/3/2021        Prior to Admission medications    Medication Sig Start Date End Date Taking? Authorizing Provider   acetaminophen  "(TYLENOL) 325 MG tablet Take 2 tablets (650 mg total) by mouth every 4 (four) hours as needed. 6/11/21   Jojo Sharma PA-C   calcium carbonate (OS-SANDER) 500 mg calcium (1,250 mg) chewable tablet  9/19/21   Provider, Historical   cyanocobalamin (VITAMIN B-12) 1000 MCG tablet Take 1 tablet (1,000 mcg total) by mouth once daily. 6/30/21   Chris Villanueva MD   doxycycline (ORACEA) 40 mg capsule Take 40 mg by mouth daily as needed.    Provider, Historical   ergocalciferol, vitamin D2, (VITAMIN D ORAL)  9/19/21   Provider, Historical   ferrous sulfate (FEOSOL) 325 mg (65 mg iron) Tab tablet Take 1 tablet (325 mg total) by mouth every other day. 7/6/23   David Siegel MD   gabapentin (NEURONTIN) 100 MG capsule TAKE ONE CAPSULE BY MOUTH EVERY MORNING AND 3 CAPSULES BY MOUTH EVERY EVENING AS DIRECTED 1/4/24   Drake Pride MD   rosuvastatin (CRESTOR) 20 MG tablet TAKE 1 TABLET(20 MG) BY MOUTH EVERY EVENING 3/30/24   David Siegel MD   clopidogreL (PLAVIX) 75 mg tablet Take 1 tablet (75 mg total) by mouth once daily. for 21 days 6/3/21 6/3/21  Savanah Casarez MD        Past medical history, surgical history, and family medical history reviewed and updated as appropriate.    Medications and allergies reviewed.     Objective:          Vitals:    07/09/24 0950   BP: 126/76   BP Location: Right arm   Patient Position: Sitting   Pulse: 75   SpO2: 100%   Weight: 76.8 kg (169 lb 5 oz)   Height: 5' 3" (1.6 m)     Body mass index is 29.99 kg/m².  Physical Exam  Constitutional:       Appearance: She is well-developed.   HENT:      Head: Normocephalic and atraumatic.   Eyes:      Extraocular Movements: Extraocular movements intact.   Cardiovascular:      Rate and Rhythm: Normal rate and regular rhythm.      Heart sounds: Normal heart sounds.   Pulmonary:      Effort: Pulmonary effort is normal. No respiratory distress.      Breath sounds: Normal breath sounds. No wheezing.   Abdominal:      General: Bowel sounds are normal. " There is no distension.      Palpations: Abdomen is soft.      Tenderness: There is no abdominal tenderness.   Musculoskeletal:         General: No tenderness. Normal range of motion.      Cervical back: Normal range of motion.   Skin:     General: Skin is warm and dry.   Neurological:      Mental Status: She is alert and oriented to person, place, and time.      Cranial Nerves: No cranial nerve deficit.      Deep Tendon Reflexes: Reflexes are normal and symmetric.         Lab Results   Component Value Date    WBC 4.77 06/25/2024    HGB 13.5 06/25/2024    HCT 39.9 06/25/2024     06/25/2024    CHOL 134 06/25/2024    TRIG 65 06/25/2024    HDL 60 06/25/2024    ALT 11 06/25/2024    AST 18 06/25/2024     06/25/2024    K 4.0 06/25/2024     06/25/2024    CREATININE 0.7 06/25/2024    BUN 11 06/25/2024    CO2 26 06/25/2024    TSH 2.544 06/25/2024    INR 1.0 06/03/2021    HGBA1C 5.5 06/25/2024       Assessment:       1. Follow-up exam    2. Essential hypertension    3. History of subdural hematoma s/p craniotomy    4. Atherosclerosis of coronary artery, unspecified vessel or lesion type, unspecified whether angina present, unspecified whether native or transplanted heart    5. History of pulmonary embolism    6. History of DVT (deep vein thrombosis)    7. Vitamin B12 deficiency    8. Vitamin D deficiency    9. Prediabetes    10. Mixed hyperlipidemia    11. Iron deficiency anemia, unspecified iron deficiency anemia type    12. Breast cancer screening by mammogram    13. Asymptomatic postmenopausal state    14. Osteoporosis screening    15. Osteopenia, unspecified location    16. Nonintractable headache, unspecified chronicity pattern, unspecified headache type          Plan:     Shirin was seen today for follow-up.    Diagnoses and all orders for this visit:    Follow-up exam    Essential hypertension    History of subdural hematoma s/p craniotomy  -     BATH/SHOWER CHAIR FOR HOME USE  -     gabapentin  (NEURONTIN) 100 MG capsule; TAKE ONE CAPSULE BY MOUTH EVERY MORNING AND 3 CAPSULES BY MOUTH EVERY EVENING AS DIRECTED    Atherosclerosis of coronary artery, unspecified vessel or lesion type, unspecified whether angina present, unspecified whether native or transplanted heart    History of pulmonary embolism  Comments:  diagnosed as provoked, was on eliquis >6 months, cleared by heme/onc    History of DVT (deep vein thrombosis)  Comments:  diagnosed as provoked, was on eliquis >6 months, cleared by heme/onc    Vitamin B12 deficiency    Vitamin D deficiency    Prediabetes  Comments:  last a1c 5.5, no longer prediabetic    Mixed hyperlipidemia  Comments:  LDL 61, at goal, no changes to current management    Iron deficiency anemia, unspecified iron deficiency anemia type  Comments:  on ferrous sulfate 325 every other day    Breast cancer screening by mammogram  -     Mammo Digital Screening Bilat; Future    Asymptomatic postmenopausal state  -     DXA Bone Density Axial Skeleton 1 or more sites; Future    Osteoporosis screening  -     DXA Bone Density Axial Skeleton 1 or more sites; Future    Osteopenia, unspecified location  -     DXA Bone Density Axial Skeleton 1 or more sites; Future    Nonintractable headache, unspecified chronicity pattern, unspecified headache type  -     gabapentin (NEURONTIN) 100 MG capsule; TAKE ONE CAPSULE BY MOUTH EVERY MORNING AND 3 CAPSULES BY MOUTH EVERY EVENING AS DIRECTED        Health maintenance reviewed with patient. Patient is due for mammogram upcoming and DEXA scan.    Follow up in about 6 months (around 1/9/2025).    David Siegel MD  Internal Medicine / Primary Care  [facility]  7/9/2024

## 2024-07-10 DIAGNOSIS — D50.9 IRON DEFICIENCY ANEMIA, UNSPECIFIED IRON DEFICIENCY ANEMIA TYPE: ICD-10-CM

## 2024-07-10 RX ORDER — FERROUS SULFATE TAB 325 MG (65 MG ELEMENTAL FE) 325 (65 FE) MG
TAB ORAL EVERY OTHER DAY
Qty: 45 TABLET | Refills: 3 | Status: SHIPPED | OUTPATIENT
Start: 2024-07-10

## 2024-09-25 DIAGNOSIS — E78.2 MIXED HYPERLIPIDEMIA: ICD-10-CM

## 2024-09-25 RX ORDER — ROSUVASTATIN CALCIUM 20 MG/1
20 TABLET, COATED ORAL NIGHTLY
Qty: 90 TABLET | Refills: 2 | Status: SHIPPED | OUTPATIENT
Start: 2024-09-25

## 2024-09-25 NOTE — TELEPHONE ENCOUNTER
No care due was identified.  [facility] Embedded Care Due Messages. Reference number: [id number].   9/25/2024 1:28:45 PM CDT

## 2024-09-26 NOTE — TELEPHONE ENCOUNTER
Refill Decision Note   Shirin Evans  is requesting a refill authorization.  Brief Assessment and Rationale for Refill:  Approve     Medication Therapy Plan:         Comments:     Note composed:11:54 PM 09/25/2024

## 2024-10-01 ENCOUNTER — HOSPITAL ENCOUNTER (OUTPATIENT)
Dept: RADIOLOGY | Facility: HOSPITAL | Age: 74
Discharge: HOME | End: 2024-10-01
Attending: INTERNAL MEDICINE
Payer: MEDICARE

## 2024-10-01 ENCOUNTER — HOSPITAL ENCOUNTER (OUTPATIENT)
Dept: RADIOLOGY | Facility: CLINIC | Age: 74
Discharge: HOME | End: 2024-10-01
Attending: INTERNAL MEDICINE
Payer: MEDICARE

## 2024-10-01 DIAGNOSIS — Z12.31 BREAST CANCER SCREENING BY MAMMOGRAM: ICD-10-CM

## 2024-10-01 DIAGNOSIS — M85.80 OSTEOPENIA, UNSPECIFIED LOCATION: ICD-10-CM

## 2024-10-01 DIAGNOSIS — Z13.820 OSTEOPOROSIS SCREENING: ICD-10-CM

## 2024-10-01 DIAGNOSIS — Z78.0 ASYMPTOMATIC POSTMENOPAUSAL STATE: ICD-10-CM

## 2024-10-01 PROCEDURE — 77067 SCR MAMMO BI INCL CAD: CPT | Mod: 26,,, | Performed by: RADIOLOGY

## 2024-10-01 PROCEDURE — 77067 SCR MAMMO BI INCL CAD: CPT | Mod: TC

## 2024-10-01 PROCEDURE — 77080 DXA BONE DENSITY AXIAL: CPT | Mod: TC

## 2024-10-01 PROCEDURE — 77063 BREAST TOMOSYNTHESIS BI: CPT | Mod: 26,,, | Performed by: RADIOLOGY

## 2025-01-14 ENCOUNTER — OFFICE VISIT (OUTPATIENT)
Dept: INTERNAL MEDICINE | Facility: CLINIC | Age: 75
End: 2025-01-14
Payer: MEDICARE

## 2025-01-14 ENCOUNTER — HOSPITAL ENCOUNTER (OUTPATIENT)
Dept: RADIOLOGY | Facility: HOSPITAL | Age: 75
Discharge: HOME | End: 2025-01-14
Attending: INTERNAL MEDICINE
Payer: MEDICARE

## 2025-01-14 VITALS
HEART RATE: 50 BPM | BODY MASS INDEX: 30.31 KG/M2 | WEIGHT: 171.06 LBS | OXYGEN SATURATION: 100 % | HEIGHT: 63 IN | SYSTOLIC BLOOD PRESSURE: 132 MMHG | DIASTOLIC BLOOD PRESSURE: 80 MMHG

## 2025-01-14 DIAGNOSIS — M85.80 OSTEOPENIA, UNSPECIFIED LOCATION: ICD-10-CM

## 2025-01-14 DIAGNOSIS — R93.7 ABNORMAL BONE DENSITY SCREENING: ICD-10-CM

## 2025-01-14 DIAGNOSIS — Z86.711 HISTORY OF PULMONARY EMBOLISM: ICD-10-CM

## 2025-01-14 DIAGNOSIS — E78.2 MIXED HYPERLIPIDEMIA: ICD-10-CM

## 2025-01-14 DIAGNOSIS — E53.8 VITAMIN B12 DEFICIENCY: ICD-10-CM

## 2025-01-14 DIAGNOSIS — Z00.00 ENCOUNTER FOR ANNUAL PHYSICAL EXAM: Primary | ICD-10-CM

## 2025-01-14 DIAGNOSIS — I10 ESSENTIAL HYPERTENSION: ICD-10-CM

## 2025-01-14 DIAGNOSIS — R51.9 CHRONIC NONINTRACTABLE HEADACHE, UNSPECIFIED HEADACHE TYPE: ICD-10-CM

## 2025-01-14 DIAGNOSIS — R73.03 PREDIABETES: ICD-10-CM

## 2025-01-14 DIAGNOSIS — D50.9 IRON DEFICIENCY ANEMIA, UNSPECIFIED IRON DEFICIENCY ANEMIA TYPE: ICD-10-CM

## 2025-01-14 DIAGNOSIS — R51.9 NONINTRACTABLE HEADACHE, UNSPECIFIED CHRONICITY PATTERN, UNSPECIFIED HEADACHE TYPE: ICD-10-CM

## 2025-01-14 DIAGNOSIS — Z86.79 HISTORY OF SUBDURAL HEMATOMA: ICD-10-CM

## 2025-01-14 DIAGNOSIS — E55.9 VITAMIN D DEFICIENCY: ICD-10-CM

## 2025-01-14 DIAGNOSIS — G89.29 CHRONIC NONINTRACTABLE HEADACHE, UNSPECIFIED HEADACHE TYPE: ICD-10-CM

## 2025-01-14 PROCEDURE — 72100 X-RAY EXAM L-S SPINE 2/3 VWS: CPT | Mod: TC

## 2025-01-14 PROCEDURE — 3008F BODY MASS INDEX DOCD: CPT | Mod: CPTII,S$GLB,, | Performed by: INTERNAL MEDICINE

## 2025-01-14 PROCEDURE — 3075F SYST BP GE 130 - 139MM HG: CPT | Mod: CPTII,S$GLB,, | Performed by: INTERNAL MEDICINE

## 2025-01-14 PROCEDURE — 3288F FALL RISK ASSESSMENT DOCD: CPT | Mod: CPTII,S$GLB,, | Performed by: INTERNAL MEDICINE

## 2025-01-14 PROCEDURE — 1159F MED LIST DOCD IN RCRD: CPT | Mod: CPTII,S$GLB,, | Performed by: INTERNAL MEDICINE

## 2025-01-14 PROCEDURE — 99397 PER PM REEVAL EST PAT 65+ YR: CPT | Mod: S$GLB,,, | Performed by: INTERNAL MEDICINE

## 2025-01-14 PROCEDURE — 3079F DIAST BP 80-89 MM HG: CPT | Mod: CPTII,S$GLB,, | Performed by: INTERNAL MEDICINE

## 2025-01-14 PROCEDURE — 99999 PR PBB SHADOW E&M-EST. PATIENT-LVL III: CPT | Mod: PBBFAC,,, | Performed by: INTERNAL MEDICINE

## 2025-01-14 PROCEDURE — 1101F PT FALLS ASSESS-DOCD LE1/YR: CPT | Mod: CPTII,S$GLB,, | Performed by: INTERNAL MEDICINE

## 2025-01-14 PROCEDURE — 1126F AMNT PAIN NOTED NONE PRSNT: CPT | Mod: CPTII,S$GLB,, | Performed by: INTERNAL MEDICINE

## 2025-01-14 PROCEDURE — 72100 X-RAY EXAM L-S SPINE 2/3 VWS: CPT | Mod: 26,,, | Performed by: STUDENT IN AN ORGANIZED HEALTH CARE EDUCATION/TRAINING PROGRAM

## 2025-01-14 RX ORDER — ROSUVASTATIN CALCIUM 20 MG/1
20 TABLET, COATED ORAL NIGHTLY
Qty: 90 TABLET | Refills: 2 | Status: SHIPPED | OUTPATIENT
Start: 2025-01-14

## 2025-01-14 RX ORDER — GABAPENTIN 100 MG/1
CAPSULE ORAL
Qty: 120 CAPSULE | Refills: 11 | Status: SHIPPED | OUTPATIENT
Start: 2025-01-14

## 2025-01-14 NOTE — PROGRESS NOTES
Subjective:       Patient ID: Shirin Evans is a 74 y.o. female.    Chief Complaint: Annual Exam      HPI  Shirin Evans is a 74 y.o. year old female with subdural hematoma s/p craniotomy, residual chronic headaches (intermittent, nonintractable), CTS s/p release of R hand, HTN (lifestyle controlled), HLD, prediabtes (lifestyle controlled) presents for annual exam.     Review of Systems   Constitutional:  Negative for activity change, appetite change, fatigue, fever and unexpected weight change.   HENT:  Negative for congestion, hearing loss, postnasal drip, sneezing, sore throat, trouble swallowing and voice change.    Eyes:  Negative for pain and discharge.   Respiratory:  Negative for cough, choking, chest tightness, shortness of breath and wheezing.    Cardiovascular:  Negative for chest pain, palpitations and leg swelling.   Gastrointestinal:  Negative for abdominal distention, abdominal pain, blood in stool, constipation, diarrhea, nausea and vomiting.   Endocrine: Negative for polydipsia and polyuria.   Genitourinary:  Negative for difficulty urinating and flank pain.   Musculoskeletal:  Negative for arthralgias, back pain, joint swelling, myalgias and neck pain.   Skin:  Negative for rash.   Neurological:  Negative for dizziness, tremors, seizures, weakness, numbness and headaches.   Psychiatric/Behavioral:  Negative for agitation. The patient is not nervous/anxious.          Past Medical History:   Diagnosis Date    Atherosclerosis of coronary artery 3/25/2020    Carpal tunnel syndrome, bilateral 2/3/2022    Headache     Hypertension     Movement disorder     Pulmonary embolism     Syncope and collapse     Weakness of right lower extremity 6/3/2021        Prior to Admission medications    Medication Sig Start Date End Date Taking? Authorizing Provider   acetaminophen (TYLENOL) 325 MG tablet Take 2 tablets (650 mg total) by mouth every 4 (four) hours as needed. 6/11/21   Jojo Sharma PA-C  "  calcium carbonate (OS-SANDER) 500 mg calcium (1,250 mg) chewable tablet  9/19/21   Provider, Historical   cyanocobalamin (VITAMIN B-12) 1000 MCG tablet Take 1 tablet (1,000 mcg total) by mouth once daily. 6/30/21   Chris Villanueva MD   doxycycline (ORACEA) 40 mg capsule Take 40 mg by mouth daily as needed.    Provider, Historical   ergocalciferol, vitamin D2, (VITAMIN D ORAL)  9/19/21   Provider, Historical   FEROSUL 325 mg (65 mg iron) Tab tablet TAKE 1 TABLET BY MOUTH EVERY OTHER DAY 7/10/24   Hanna Brooks PA-C   gabapentin (NEURONTIN) 100 MG capsule TAKE ONE CAPSULE BY MOUTH EVERY MORNING AND 3 CAPSULES BY MOUTH EVERY EVENING AS DIRECTED 7/9/24   David Siegel MD   rosuvastatin (CRESTOR) 20 MG tablet TAKE 1 TABLET(20 MG) BY MOUTH EVERY EVENING 9/25/24   David Siegel MD   clopidogreL (PLAVIX) 75 mg tablet Take 1 tablet (75 mg total) by mouth once daily. for 21 days 6/3/21 6/3/21  Savanah Casarez MD        Past medical history, surgical history, and family medical history reviewed and updated as appropriate.    Medications and allergies reviewed.     Objective:          Vitals:    01/14/25 1011   BP: 132/80   BP Location: Right arm   Patient Position: Sitting   Pulse: (!) 50   SpO2: 100%   Weight: 77.6 kg (171 lb 1.2 oz)   Height: 5' 3" (1.6 m)     Body mass index is 30.3 kg/m².  Physical Exam  Constitutional:       Appearance: She is well-developed.   HENT:      Head: Normocephalic and atraumatic.   Eyes:      Extraocular Movements: Extraocular movements intact.   Cardiovascular:      Rate and Rhythm: Normal rate and regular rhythm.      Heart sounds: Normal heart sounds.   Pulmonary:      Effort: Pulmonary effort is normal. No respiratory distress.      Breath sounds: Normal breath sounds. No wheezing.   Abdominal:      General: Bowel sounds are normal. There is no distension.      Palpations: Abdomen is soft.      Tenderness: There is no abdominal tenderness.   Musculoskeletal:         General: No " tenderness. Normal range of motion.      Cervical back: Normal range of motion.   Skin:     General: Skin is warm and dry.   Neurological:      Mental Status: She is alert and oriented to person, place, and time.      Cranial Nerves: No cranial nerve deficit.      Deep Tendon Reflexes: Reflexes are normal and symmetric.         Lab Results   Component Value Date    WBC 4.77 06/25/2024    HGB 13.5 06/25/2024    HCT 39.9 06/25/2024     06/25/2024    CHOL 134 06/25/2024    TRIG 65 06/25/2024    HDL 60 06/25/2024    ALT 11 06/25/2024    AST 18 06/25/2024     06/25/2024    K 4.0 06/25/2024     06/25/2024    CREATININE 0.7 06/25/2024    BUN 11 06/25/2024    CO2 26 06/25/2024    TSH 2.544 06/25/2024    INR 1.0 06/03/2021    HGBA1C 5.5 06/25/2024       Assessment:       1. Encounter for annual physical exam    2. Essential hypertension    3. Prediabetes    4. Mixed hyperlipidemia    5. History of subdural hematoma s/p craniotomy    6. Chronic nonintractable headache, unspecified headache type    7. History of pulmonary embolism    8. Vitamin B12 deficiency    9. Vitamin D deficiency    10. Iron deficiency anemia, unspecified iron deficiency anemia type    11. BMI 26.0-26.9,adult    12. Osteopenia, unspecified location    13. Abnormal bone density screening    14. Nonintractable headache, unspecified chronicity pattern, unspecified headache type    15. Mixed hyperlipidemia          Plan:     Shirin was seen today for annual exam.    Diagnoses and all orders for this visit:    Encounter for annual physical exam    Essential hypertension  Comments:  lifestyle controlled, no changes to current management    Prediabetes  Comments:  last A1c normal. Recheck A1c and renal function  Orders:  -     BASIC METABOLIC PANEL; Future  -     HEMOGLOBIN A1C; Future    Mixed hyperlipidemia  Comments:  on rosuvastatin 20, no changes to current management  Orders:  -     rosuvastatin (CRESTOR) 20 MG tablet; Take 1 tablet (20  mg total) by mouth every evening.    History of subdural hematoma s/p craniotomy  -     gabapentin (NEURONTIN) 100 MG capsule; TAKE ONE CAPSULE BY MOUTH EVERY MORNING AND 3 CAPSULES BY MOUTH EVERY EVENING AS DIRECTED    Chronic nonintractable headache, unspecified headache type    History of pulmonary embolism  Comments:  cleared by heme/onc, no longer on OAC    Vitamin B12 deficiency    Vitamin D deficiency    Iron deficiency anemia, unspecified iron deficiency anemia type    BMI 26.0-26.9,adult    Osteopenia, unspecified location  Comments:  recommended to start calcium and vitamin D3  Orders:  -     X-Ray Lumbar Spine AP And Lateral; Future    Abnormal bone density screening  Comments:  wide variation of BMD between L1 and L2, as well as L3 and L4; will obtain X-ray of lumbar spine. If fracture, treat as osteoporosis.  Orders:  -     X-Ray Lumbar Spine AP And Lateral; Future    Nonintractable headache, unspecified chronicity pattern, unspecified headache type  -     gabapentin (NEURONTIN) 100 MG capsule; TAKE ONE CAPSULE BY MOUTH EVERY MORNING AND 3 CAPSULES BY MOUTH EVERY EVENING AS DIRECTED    Mixed hyperlipidemia  Comments:  on crestor 20, tolerating, no changes ot current management  Orders:  -     rosuvastatin (CRESTOR) 20 MG tablet; Take 1 tablet (20 mg total) by mouth every evening.    Benign physical examination, no issues identified. Will obtain routine labwork and age appropriate health screenings.     Health maintenance reviewed with patient.     Follow up in about 6 months (around 7/14/2025).    David Siegel MD  Internal Medicine / Primary Care  [facility]  1/14/2025

## 2025-01-14 NOTE — PATIENT INSTRUCTIONS
Check your blood pressures at home, twice a day, to make sure that it is within reasonable range. Goal blood pressure <130/<80 (120s/70s).  Nurse visit for blood pressure check / BP device check.     Labwork today  X-ray of your lumbar spine today  Return to clinic in 6 months for follow up.

## 2025-01-28 ENCOUNTER — CLINICAL SUPPORT (OUTPATIENT)
Dept: INTERNAL MEDICINE | Facility: CLINIC | Age: 75
End: 2025-01-28
Payer: MEDICARE

## 2025-01-28 DIAGNOSIS — I10 ESSENTIAL HYPERTENSION: Primary | ICD-10-CM

## 2025-01-28 NOTE — PROGRESS NOTES
Pt here for BP nurse visit, 2 pt identifiers used. Pt did come in with a BP log from home as well as her home cuff. Pt's log had readings ranging from the low 100s over 80s to 140s over 90s. Pt states her BP always varies. Pt's BP was taken with her home cuff and read 146/88, P: 62, O2sat: 99%. Pt was asked to sit back out in the lobby for 5 minutes so that her BP can be taken manually. Pt's manual BP read 130/90 twice. Pt states she is afraid she may need medication and believes her diet is playing a part

## 2025-03-27 ENCOUNTER — PATIENT MESSAGE (OUTPATIENT)
Dept: ADMINISTRATIVE | Facility: HOSPITAL | Age: 75
End: 2025-03-27
Payer: MEDICARE

## 2025-03-28 DIAGNOSIS — Z12.11 SCREENING FOR COLON CANCER: ICD-10-CM

## 2025-06-27 DIAGNOSIS — E78.2 MIXED HYPERLIPIDEMIA: ICD-10-CM

## 2025-06-27 RX ORDER — ROSUVASTATIN CALCIUM 20 MG/1
20 TABLET, COATED ORAL NIGHTLY
Qty: 90 TABLET | Refills: 3 | Status: SHIPPED | OUTPATIENT
Start: 2025-06-27

## 2025-06-27 NOTE — TELEPHONE ENCOUNTER
Refill Routing Note   Medication(s) are not appropriate for processing by [facility] for the following reason(s):        Required labs outdated    [facility] action(s):  Defer     Requires labs : Yes             Appointments  past 12m or future 3m with PCP    Date Provider   Last Visit   1/14/2025 David Siegel MD   Next Visit   7/15/2025 David Siegel MD   ED visits in past 90 days: 0        Note composed:5:30 PM 06/27/2025

## 2025-06-27 NOTE — TELEPHONE ENCOUNTER
Care Due:                  Date            Visit Type   Department     Provider  --------------------------------------------------------------------------------                                EP -                              PRIMARY      [facility] INTERNAL  Last Visit: 01-      CARE ([facility])   JOSE ANGEL Siegel                              EP -                              PRIMARY      [facility] INTERNAL  Next Visit: 07-      CARE ([facility])   JOSE ANGEL Siegel                                                            Last  Test          Frequency    Reason                     Performed    Due Date  --------------------------------------------------------------------------------    CMP.........  12 months..  rosuvastatin.............  06- 06-    Lipid Panel.  12 months..  rosuvastatin.............  06- 06-    [employer] Embedded Care Due Messages. Reference number: [id number].   6/27/2025 9:24:24 AM CDT

## 2025-06-28 DIAGNOSIS — E78.2 MIXED HYPERLIPIDEMIA: ICD-10-CM

## 2025-06-28 RX ORDER — ROSUVASTATIN CALCIUM 20 MG/1
20 TABLET, COATED ORAL NIGHTLY
Qty: 90 TABLET | Refills: 3 | OUTPATIENT
Start: 2025-06-28

## 2025-06-28 NOTE — TELEPHONE ENCOUNTER
No care due was identified.  [facility] Embedded Care Due Messages. Reference number: [id number].   6/28/2025 3:19:20 AM CDT

## 2025-06-29 DIAGNOSIS — Z86.79 HISTORY OF SUBDURAL HEMATOMA: ICD-10-CM

## 2025-06-29 DIAGNOSIS — R51.9 NONINTRACTABLE HEADACHE, UNSPECIFIED CHRONICITY PATTERN, UNSPECIFIED HEADACHE TYPE: ICD-10-CM

## 2025-06-29 NOTE — TELEPHONE ENCOUNTER
Refill Decision Note   Shirin Nathan  is requesting a refill authorization.  Brief Assessment and Rationale for Refill:  Quick Discontinue     Medication Therapy Plan:  E-Prescribing Status: Receipt confirmed by Erm[facility]avid #[id number] (6/27/2025      Comments:     Note composed:7:22 PM 06/28/2025

## 2025-06-29 NOTE — TELEPHONE ENCOUNTER
No care due was identified.  [facility] Embedded Care Due Messages. Reference number: [id number].   6/29/2025 12:48:29 PM CDT

## 2025-06-30 RX ORDER — GABAPENTIN 100 MG/1
CAPSULE ORAL
Qty: 120 CAPSULE | Refills: 11 | Status: SHIPPED | OUTPATIENT
Start: 2025-06-30

## 2025-07-03 DIAGNOSIS — D50.9 IRON DEFICIENCY ANEMIA, UNSPECIFIED IRON DEFICIENCY ANEMIA TYPE: ICD-10-CM

## 2025-07-03 RX ORDER — FERROUS SULFATE 325(65) MG
TABLET ORAL EVERY OTHER DAY
Qty: 45 TABLET | Refills: 3 | Status: SHIPPED | OUTPATIENT
Start: 2025-07-03

## 2025-07-03 NOTE — TELEPHONE ENCOUNTER
Refill Routing Note   Medication(s) are not appropriate for processing by [facility] for the following reason(s):        Outside of protocol    [facility] action(s):  Route               Appointments  past 12m or future 3m with PCP    Date Provider   Last Visit   1/14/2025 David Seigel MD   Next Visit   7/15/2025 David Siegel MD   ED visits in past 90 days: 0        Note composed:2:02 PM 07/03/2025

## 2025-07-15 ENCOUNTER — OFFICE VISIT (OUTPATIENT)
Dept: INTERNAL MEDICINE | Facility: CLINIC | Age: 75
End: 2025-07-15
Payer: MEDICARE

## 2025-07-15 ENCOUNTER — LAB VISIT (OUTPATIENT)
Dept: LAB | Facility: HOSPITAL | Age: 75
End: 2025-07-15
Attending: INTERNAL MEDICINE
Payer: MEDICARE

## 2025-07-15 VITALS
WEIGHT: 172.81 LBS | OXYGEN SATURATION: 99 % | HEART RATE: 61 BPM | HEIGHT: 63 IN | BODY MASS INDEX: 30.62 KG/M2 | DIASTOLIC BLOOD PRESSURE: 74 MMHG | SYSTOLIC BLOOD PRESSURE: 114 MMHG

## 2025-07-15 DIAGNOSIS — R73.03 PREDIABETES: ICD-10-CM

## 2025-07-15 DIAGNOSIS — Z00.00 LABORATORY EXAMINATION ORDERED AS PART OF A ROUTINE GENERAL MEDICAL EXAMINATION: ICD-10-CM

## 2025-07-15 DIAGNOSIS — Z12.31 BREAST CANCER SCREENING BY MAMMOGRAM: ICD-10-CM

## 2025-07-15 DIAGNOSIS — E61.1 IRON DEFICIENCY: ICD-10-CM

## 2025-07-15 DIAGNOSIS — I10 ESSENTIAL HYPERTENSION: ICD-10-CM

## 2025-07-15 DIAGNOSIS — E78.2 MIXED HYPERLIPIDEMIA: ICD-10-CM

## 2025-07-15 DIAGNOSIS — E53.8 B12 DEFICIENCY: ICD-10-CM

## 2025-07-15 DIAGNOSIS — Z00.00 ENCOUNTER FOR ANNUAL PHYSICAL EXAM: Primary | ICD-10-CM

## 2025-07-15 DIAGNOSIS — E55.9 VITAMIN D DEFICIENCY: ICD-10-CM

## 2025-07-15 LAB
25(OH)D3+25(OH)D2 SERPL-MCNC: 61 NG/ML (ref 30–96)
ABSOLUTE EOSINOPHIL (OHS): 0.09 K/UL
ABSOLUTE MONOCYTE (OHS): 0.41 K/UL (ref 0.3–1)
ABSOLUTE NEUTROPHIL COUNT (OHS): 3.05 K/UL (ref 1.8–7.7)
ALBUMIN SERPL BCP-MCNC: 4.7 G/DL (ref 3.5–5.2)
ALP SERPL-CCNC: 62 UNIT/L (ref 40–150)
ALT SERPL W/O P-5'-P-CCNC: 20 UNIT/L (ref 10–44)
ANION GAP (OHS): 8 MMOL/L (ref 8–16)
AST SERPL-CCNC: 25 UNIT/L (ref 11–45)
BASOPHILS # BLD AUTO: 0.05 K/UL
BASOPHILS NFR BLD AUTO: 0.9 %
BILIRUB SERPL-MCNC: 0.6 MG/DL (ref 0.1–1)
BUN SERPL-MCNC: 9 MG/DL (ref 8–23)
CALCIUM SERPL-MCNC: 10.1 MG/DL (ref 8.7–10.5)
CHLORIDE SERPL-SCNC: 106 MMOL/L (ref 95–110)
CHOLEST SERPL-MCNC: 159 MG/DL (ref 120–199)
CHOLEST/HDLC SERPL: 2.1 {RATIO} (ref 2–5)
CO2 SERPL-SCNC: 28 MMOL/L (ref 23–29)
CREAT SERPL-MCNC: 0.7 MG/DL (ref 0.5–1.4)
EAG (OHS): 117 MG/DL (ref 68–131)
ERYTHROCYTE [DISTWIDTH] IN BLOOD BY AUTOMATED COUNT: 13.2 % (ref 11.5–14.5)
FERRITIN SERPL-MCNC: 266 NG/ML (ref 20–300)
GFR SERPLBLD CREATININE-BSD FMLA CKD-EPI: >60 ML/MIN/1.73/M2
GLUCOSE SERPL-MCNC: 93 MG/DL (ref 70–110)
HBA1C MFR BLD: 5.7 % (ref 4–5.6)
HCT VFR BLD AUTO: 40.6 % (ref 37–48.5)
HDLC SERPL-MCNC: 74 MG/DL (ref 40–75)
HDLC SERPL: 46.5 % (ref 20–50)
HGB BLD-MCNC: 13.4 GM/DL (ref 12–16)
IMM GRANULOCYTES # BLD AUTO: 0.02 K/UL (ref 0–0.04)
IMM GRANULOCYTES NFR BLD AUTO: 0.4 % (ref 0–0.5)
IRON SATN MFR SERPL: 30 % (ref 20–50)
IRON SERPL-MCNC: 126 UG/DL (ref 30–160)
LDLC SERPL CALC-MCNC: 72 MG/DL (ref 63–159)
LYMPHOCYTES # BLD AUTO: 1.83 K/UL (ref 1–4.8)
MCH RBC QN AUTO: 31.5 PG (ref 27–31)
MCHC RBC AUTO-ENTMCNC: 33 G/DL (ref 32–36)
MCV RBC AUTO: 96 FL (ref 82–98)
NONHDLC SERPL-MCNC: 85 MG/DL
NUCLEATED RBC (/100WBC) (OHS): 0 /100 WBC
PLATELET # BLD AUTO: 188 K/UL (ref 150–450)
PMV BLD AUTO: 9.7 FL (ref 9.2–12.9)
POTASSIUM SERPL-SCNC: 4.4 MMOL/L (ref 3.5–5.1)
PROT SERPL-MCNC: 7.8 GM/DL (ref 6–8.4)
RBC # BLD AUTO: 4.25 M/UL (ref 4–5.4)
RELATIVE EOSINOPHIL (OHS): 1.7 %
RELATIVE LYMPHOCYTE (OHS): 33.6 % (ref 18–48)
RELATIVE MONOCYTE (OHS): 7.5 % (ref 4–15)
RELATIVE NEUTROPHIL (OHS): 55.9 % (ref 38–73)
SODIUM SERPL-SCNC: 142 MMOL/L (ref 136–145)
TIBC SERPL-MCNC: 416 UG/DL (ref 250–450)
TRANSFERRIN SERPL-MCNC: 281 MG/DL (ref 200–375)
TRIGL SERPL-MCNC: 65 MG/DL (ref 30–150)
TSH SERPL-ACNC: 1.98 UIU/ML (ref 0.4–4)
VIT B12 SERPL-MCNC: >2000 PG/ML (ref 210–950)
WBC # BLD AUTO: 5.45 K/UL (ref 3.9–12.7)

## 2025-07-15 PROCEDURE — 1159F MED LIST DOCD IN RCRD: CPT | Mod: CPTII,S$GLB,, | Performed by: INTERNAL MEDICINE

## 2025-07-15 PROCEDURE — 85025 COMPLETE CBC W/AUTO DIFF WBC: CPT

## 2025-07-15 PROCEDURE — 3288F FALL RISK ASSESSMENT DOCD: CPT | Mod: CPTII,S$GLB,, | Performed by: INTERNAL MEDICINE

## 2025-07-15 PROCEDURE — 36415 COLL VENOUS BLD VENIPUNCTURE: CPT

## 2025-07-15 PROCEDURE — 82306 VITAMIN D 25 HYDROXY: CPT

## 2025-07-15 PROCEDURE — 99397 PER PM REEVAL EST PAT 65+ YR: CPT | Mod: S$GLB,,, | Performed by: INTERNAL MEDICINE

## 2025-07-15 PROCEDURE — 82607 VITAMIN B-12: CPT

## 2025-07-15 PROCEDURE — 3044F HG A1C LEVEL LT 7.0%: CPT | Mod: CPTII,S$GLB,, | Performed by: INTERNAL MEDICINE

## 2025-07-15 PROCEDURE — 1126F AMNT PAIN NOTED NONE PRSNT: CPT | Mod: CPTII,S$GLB,, | Performed by: INTERNAL MEDICINE

## 2025-07-15 PROCEDURE — 99999 PR PBB SHADOW E&M-EST. PATIENT-LVL III: CPT | Mod: PBBFAC,,, | Performed by: INTERNAL MEDICINE

## 2025-07-15 PROCEDURE — 83036 HEMOGLOBIN GLYCOSYLATED A1C: CPT

## 2025-07-15 PROCEDURE — 1101F PT FALLS ASSESS-DOCD LE1/YR: CPT | Mod: CPTII,S$GLB,, | Performed by: INTERNAL MEDICINE

## 2025-07-15 PROCEDURE — 80061 LIPID PANEL: CPT

## 2025-07-15 PROCEDURE — 82947 ASSAY GLUCOSE BLOOD QUANT: CPT

## 2025-07-15 PROCEDURE — 3074F SYST BP LT 130 MM HG: CPT | Mod: CPTII,S$GLB,, | Performed by: INTERNAL MEDICINE

## 2025-07-15 PROCEDURE — 3008F BODY MASS INDEX DOCD: CPT | Mod: CPTII,S$GLB,, | Performed by: INTERNAL MEDICINE

## 2025-07-15 PROCEDURE — 83540 ASSAY OF IRON: CPT

## 2025-07-15 PROCEDURE — 3078F DIAST BP <80 MM HG: CPT | Mod: CPTII,S$GLB,, | Performed by: INTERNAL MEDICINE

## 2025-07-15 PROCEDURE — 84443 ASSAY THYROID STIM HORMONE: CPT

## 2025-07-15 PROCEDURE — 82728 ASSAY OF FERRITIN: CPT

## 2025-07-15 NOTE — PROGRESS NOTES
Subjective:      History of Present Illness    CHIEF COMPLAINT:  Shirin presents for an annual wellness visit and follow-up on her recovery from a fall and subsequent surgery in 2021.    HPI:  Shirin reports feeling improved with more favorable days than unfavorable ones. She had a fall in May or June of 2021, resulting in surgery in June 2021. She notes decreased accuracy in mental math compared to pre-fall and surgery, and difficulty with temporal memory, particularly in recalling the timing of past events. She indicates that 2021 was difficult to recall, with 2022 being more memorable. She reports improvement in her memory issues.    Her left hand, which previously had carpal tunnel-like symptoms, has improved without intervention. The pain in her left hand resolved concurrently with the resolution of pain in her right hand.    She has been monitoring her blood pressure at home, reporting readings of around 133/80-90. She expresses concern about the accuracy of her 4-year-old blood pressure machine, noting inconsistent readings. She mentions feeling anxious when taking her blood pressure, which may affect the readings.    She denies any recent falls, abdominal pain, or worsening of her memory issues since her last visit. She denies currently being on anticoagulation medication.    MEDICAL HISTORY:  Shirin has a history of blood clot and osteoporosis or low bone density.    SURGICAL HISTORY:  Shirin underwent surgery in June 2021 related to a fall. She also had carpal tunnel surgery on her non-dominant hand, which she reports is working very well.      ROS:  Gastrointestinal: -abdominal pain  Musculoskeletal: +limb swelling  Neurological: +numbness, +memory loss, +memory problems         Past medical history, surgical history, and family medical history reviewed and updated as appropriate.    Medications and allergies reviewed.     Objective:          Vitals:    07/15/25 0958   BP: 114/74   BP Location: Right arm  "  Patient Position: Sitting   Pulse: 61   SpO2: 99%   Weight: 78.4 kg (172 lb 13.5 oz)   Height: 5' 3" (1.6 m)     Body mass index is 30.62 kg/m².  Physical Exam  Constitutional:       Appearance: She is well-developed.   HENT:      Head: Normocephalic and atraumatic.   Eyes:      Pupils: Pupils are equal, round, and reactive to light.   Cardiovascular:      Rate and Rhythm: Normal rate and regular rhythm.      Heart sounds: Normal heart sounds.   Pulmonary:      Effort: Pulmonary effort is normal. No respiratory distress.      Breath sounds: Normal breath sounds. No wheezing.   Abdominal:      General: Bowel sounds are normal. There is no distension.      Palpations: Abdomen is soft.      Tenderness: There is no abdominal tenderness.   Musculoskeletal:         General: No tenderness. Normal range of motion.      Cervical back: Normal range of motion.   Skin:     General: Skin is warm and dry.   Neurological:      Mental Status: She is alert and oriented to person, place, and time.      Cranial Nerves: No cranial nerve deficit.      Deep Tendon Reflexes: Reflexes are normal and symmetric.         Lab Results   Component Value Date    WBC 4.77 06/25/2024    HGB 13.5 06/25/2024    HCT 39.9 06/25/2024     06/25/2024    CHOL 134 06/25/2024    TRIG 65 06/25/2024    HDL 60 06/25/2024    ALT 11 06/25/2024    AST 18 06/25/2024     01/14/2025    K 3.8 01/14/2025     01/14/2025    CREATININE 0.7 01/14/2025    BUN 12 01/14/2025    CO2 28 01/14/2025    TSH 2.544 06/25/2024    INR 1.0 06/03/2021    HGBA1C 5.7 (H) 01/14/2025       Assessment:       1. Encounter for annual physical exam    2. Essential hypertension    3. Prediabetes    4. Mixed hyperlipidemia    5. Breast cancer screening by mammogram    6. Vitamin D deficiency    7. Iron deficiency    8. B12 deficiency    9. Laboratory examination ordered as part of a routine general medical examination          Plan:     Shirin was seen today for " follow-up.    Diagnoses and all orders for this visit:    Encounter for annual physical exam    Essential hypertension  Comments:  lifestyle controlled, bp in clinic 132/72. no changes to current management  Orders:  -     CBC Auto Differential; Future  -     Comprehensive Metabolic Panel; Future  -     TSH; Future    Prediabetes  Comments:  last a1c slightly elevated, lifestyle controlled, recheck hemoglobin a1c.  Orders:  -     Hemoglobin A1C; Future    Mixed hyperlipidemia  Comments:  on rosuvastatin 20 mg daily, recheck lipid panel  Orders:  -     TSH; Future  -     Lipid Panel; Future    Breast cancer screening by mammogram  -     Mammo Digital Screening Bilat; Future    Vitamin D deficiency  Comments:  on vitamin D / calcium supplement. recheck vitamin  D leveles  Orders:  -     Vitamin D; Future    Iron deficiency  -     Iron and TIBC; Future  -     Ferritin; Future    B12 deficiency  -     Vitamin B12; Future    Laboratory examination ordered as part of a routine general medical examination  -     CBC Auto Differential; Future  -     Comprehensive Metabolic Panel; Future  -     TSH; Future  -     Hemoglobin A1C; Future  -     Lipid Panel; Future    Benign physical examination, no issues identified. Will obtain routine labwork and age appropriate health screenings.     Health maintenance reviewed with patient.     Follow up in about 6 months (around 1/15/2026).    David Siegel MD  Internal Medicine / Primary Care  [facility]  7/15/2025    This note was generated with the assistance of ambient listening technology. Verbal consent was obtained by the patient and accompanying visitor(s) for the recording of patient appointment to facilitate this note. I attest to having reviewed and edited the generated note for accuracy, though some syntax or spelling errors may persist. Please contact the author of this note for any clarification.

## (undated) DEVICE — MARKER SKIN STND TIP BLUE BARR

## (undated) DEVICE — BLADE SURG CARBON STEEL SZ11

## (undated) DEVICE — Device

## (undated) DEVICE — SYR B-D DISP CONTROL 10CC100/C

## (undated) DEVICE — PAD CAST 2 IN X 4YDS STERILE

## (undated) DEVICE — DRAIN SIL FLT FULL FLUTED 7F

## (undated) DEVICE — SUT 4/0 18IN ETHILON BL P3

## (undated) DEVICE — TUBE FRAZIER 5MM 2FT SOFT TIP

## (undated) DEVICE — SEE MEDLINE ITEM 156905

## (undated) DEVICE — TAPE SURG MEDIPORE 6X72IN

## (undated) DEVICE — CARTRIDGE OIL

## (undated) DEVICE — DRESSING TELFA N ADH 3X8

## (undated) DEVICE — NDL 18GA X1 1/2 REG BEVEL

## (undated) DEVICE — HOOK LONE STAR BLUNT 12MM

## (undated) DEVICE — STAPLER SKIN PROXIMATE WIDE

## (undated) DEVICE — BANDAGE MATRIX HK LOOP 2IN 5YD

## (undated) DEVICE — DRAPE STERI-DRAPE 1000 17X11IN

## (undated) DEVICE — EVACUATOR WOUND BULB 100CC

## (undated) DEVICE — SUT CTD VICRYL 2-0 CR/CT-2

## (undated) DEVICE — DIFFUSER

## (undated) DEVICE — ELECTRODE REM PLYHSV RETURN 9

## (undated) DEVICE — DRAPE CORETEMP FLD WRM 56X62IN

## (undated) DEVICE — GLOVE BIOGEL PI MICRO INDIC 7

## (undated) DEVICE — DURAPREP SURG SCRUB 26ML

## (undated) DEVICE — TOURNIQUET SB QC DP 18X4IN

## (undated) DEVICE — SUT 4/0 18IN NUROLON BLK B

## (undated) DEVICE — GLOVE BIOGEL PI MICRO SZ 7

## (undated) DEVICE — DRESSING SURGICAL 1X3

## (undated) DEVICE — BURR ROUTER TAPERED

## (undated) DEVICE — ROUTER TAPERED 2.3MM

## (undated) DEVICE — SEE MEDLINE ITEM 146409

## (undated) DEVICE — DRESSING N ADH OIL EMUL 3X3

## (undated) DEVICE — GLOVE BIOGEL PI MICRO SZ 6

## (undated) DEVICE — UNDERGLOVES BIOGEL PI SZ 6 LF

## (undated) DEVICE — CORD BIPOLAR 12 FOOT

## (undated) DEVICE — SPONGE SURGIFOAM 100 8.5X12X10

## (undated) DEVICE — GAUZE SPONGE 4X4 12PLY

## (undated) DEVICE — NDL 22GA X1 1/2 REG BEVEL

## (undated) DEVICE — GOWN SURGICAL X-LARGE

## (undated) DEVICE — SPRAY MASTISOL

## (undated) DEVICE — SEE MEDLINE ITEM 157103